# Patient Record
Sex: MALE | Race: WHITE | Employment: OTHER | URBAN - METROPOLITAN AREA
[De-identification: names, ages, dates, MRNs, and addresses within clinical notes are randomized per-mention and may not be internally consistent; named-entity substitution may affect disease eponyms.]

---

## 2017-01-13 ENCOUNTER — HOSPITAL ENCOUNTER (OUTPATIENT)
Dept: LAB | Age: 67
Discharge: HOME | End: 2017-01-13

## 2017-01-13 PROCEDURE — 99001 SPECIMEN HANDLING PT-LAB: CPT | Performed by: INTERNAL MEDICINE

## 2017-01-17 ENCOUNTER — OFFICE VISIT (OUTPATIENT)
Dept: INTERNAL MEDICINE CLINIC | Age: 67
End: 2017-01-17

## 2017-01-17 VITALS
TEMPERATURE: 97.6 F | HEIGHT: 69 IN | SYSTOLIC BLOOD PRESSURE: 122 MMHG | BODY MASS INDEX: 24.73 KG/M2 | WEIGHT: 167 LBS | OXYGEN SATURATION: 98 % | DIASTOLIC BLOOD PRESSURE: 62 MMHG | HEART RATE: 86 BPM

## 2017-01-17 DIAGNOSIS — M54.50 CHRONIC LOW BACK PAIN WITHOUT SCIATICA, UNSPECIFIED BACK PAIN LATERALITY: ICD-10-CM

## 2017-01-17 DIAGNOSIS — R80.9 MICROALBUMINURIA: ICD-10-CM

## 2017-01-17 DIAGNOSIS — E87.1 HYPONATREMIA: ICD-10-CM

## 2017-01-17 DIAGNOSIS — I73.9 PERIPHERAL VASCULAR DISEASE (HCC): ICD-10-CM

## 2017-01-17 DIAGNOSIS — G89.29 CHRONIC LOW BACK PAIN WITHOUT SCIATICA, UNSPECIFIED BACK PAIN LATERALITY: ICD-10-CM

## 2017-01-17 DIAGNOSIS — R21 RASH: ICD-10-CM

## 2017-01-17 DIAGNOSIS — I10 ESSENTIAL HYPERTENSION: Primary | ICD-10-CM

## 2017-01-17 DIAGNOSIS — Z23 ENCOUNTER FOR IMMUNIZATION: ICD-10-CM

## 2017-01-17 DIAGNOSIS — E78.5 HYPERLIPIDEMIA, UNSPECIFIED HYPERLIPIDEMIA TYPE: ICD-10-CM

## 2017-01-17 DIAGNOSIS — E13.8 DIABETES MELLITUS OF OTHER TYPE WITH COMPLICATION: ICD-10-CM

## 2017-01-17 NOTE — PROGRESS NOTES
1. Have you been to the ER, urgent care clinic or hospitalized since your last visit? NO.     2. Have you seen or consulted any other health care providers outside of the [facility] since your last visit (Include any pap smears or colon screening)? NO      Do you have an Advanced Directive? NO    Would you like information on Advanced Directives?  YES

## 2017-01-17 NOTE — MR AVS SNAPSHOT
Visit Information Date & Time Provider Department Dept. Phone Encounter #  
 1/17/2017  8:15 AM Jolly Rowe MD Internist of [facility] [phone] [id number] Your Appointments 7/13/2017  8:15 AM  
LAB with IOC NURSE VISIT Internist of [facility] ([address]) Appt Note: labs [address]  
 [address], [address] [place] [address] [place]  
  
   
 [address], [address]  
  
    
 7/20/2017  8:00 AM  
Office Visit with Jolly Rowe MD  
[facility]ist of [facility] [address]) Appt Note: [address]  
 [address], [address] [place] [address] [place]  
  
   
 [address] [facility], [address]  
  
    
 12/26/2017 12:45 PM  
PROCEDURE with [facility] IMAGING 1  
[facility] (SONIA SCHEDULING) Appt Note: cv [place] [address] [address]  
[phone] [address] [facility] [address]  
  
    
 12/26/2017  1:45 PM  
PROCEDURE with [facility] NONIMAGING  
[facility] (SONIA SCHEDULING) Appt Note: leg art [place] [address] [address]  
[phone] [address] [facility] [address]  
  
    
 1/2/2018  9:00 AM  
Follow Up with MICK Evans  
[facility] (SONIA SCHEDULING) [address] [facility] [address] [place] [address] [address] [address]  
[phone]  
  
   
 [address] [facility] [address] [place] [address] [address] [address] Upcoming Health Maintenance Date Due  
 FOOT EXAM Q1 2/18/2015 INFLUENZA AGE 9 TO ADULT 8/1/2016 LIPID PANEL Q1 9/1/2016 HEMOGLOBIN A1C Q6M 1/6/2017 EYE EXAM RETINAL OR DILATED Q1 3/16/2017 MICROALBUMIN Q1 7/6/2017 Pneumococcal 65+ Low/Medium Risk (2 of 2 - PPSV23) 7/13/2017 MEDICARE YEARLY EXAM 7/14/2017 GLAUCOMA SCREENING Q2Y 3/16/2018 COLONOSCOPY 6/18/2019 DTaP/Tdap/Td series (2 - Td) 2/17/2024 Allergies as of 1/17/2017  Review Complete On: 7/13/2016 By: rFanky Hodge MD  
 No Known Allergies Current Immunizations  Reviewed on 1/8/2015 Name Date Influenza High Dose Vaccine PF  Incomplete Influenza Vaccine 10/1/2012 Influenza Vaccine Split 10/14/2011 Pneumococcal Conjugate (PCV-13) 7/13/2016 Pneumococcal Vaccine (Unspecified Type) 8/17/2010 Tdap 2/17/2014 Zoster 8/18/2010 Not reviewed this visit You Were Diagnosed With   
  
 Codes Comments Diabetes mellitus of other type with complication ([facility]     XJQ-19-VE: E13.8 Secondary hypertension     ICD-10-CM: I15.9 ICD-9-CM: 405.99 Hyperlipidemia, unspecified hyperlipidemia type     ICD-10-CM: E78.5 ICD-9-CM: 272.4 Microalbuminuria     ICD-10-CM: R80.9 ICD-9-CM: 791.0 Encounter for immunization     ICD-10-CM: A71 ICD-9-CM: V03.89 Vitals BP Pulse Temp Height(growth percentile) Weight(growth percentile) SpO2  
 122/62 86 97.6 °F (36.4 °C) (Oral) 5' 9\" (1.753 m) 167 lb (75.8 kg) 98% BMI Smoking Status 24.66 kg/m2 Former Smoker Vitals History BMI and BSA Data Body Mass Index Body Surface Area  
 24.66 kg/m 2 1.92 m 2 Preferred Pharmacy Pharmacy Name Phone [address] [place] [address], [address] [place] [address] [phone] Your Updated Medication List  
  
   
This list is accurate as of: 1/17/17  8:52 AM.  Always use your most recent med list.  
  
  
  
  
 atorvastatin 10 mg tablet Commonly known as:  LIPITOR Take 1 Tab by mouth daily. benazepril 40 mg tablet Commonly known as:  LOTENSIN  
TAKE 1 TABLET BY MOUTH ONCE DAILY  
  
 chlorthalidone 25 mg tablet Commonly known as:  HYGROTEN  
TAKE ONE TABLET BY MOUTH ONCE DAILY  
  
 clopidogrel 75 mg Tab Commonly known as:  PLAVIX TAKE 1 TABLET BY MOUTH ONCE DAILY  
  
 diazePAM 5 mg tablet Commonly known as:  VALIUM  
 TAKE 1 TABLET BY MOUTH 2 TIMES A DAY AS NEEDED  
  
 gabapentin 600 mg tablet Commonly known as:  NEURONTIN  
TAKE 1 TABLET BY MOUTH 3 TIMES A DAY HYDROcodone-acetaminophen 5-325 mg per tablet Commonly known as:  Julaine Kava Take 1 Tab by mouth every six (6) hours as needed for Pain.  
  
 lansoprazole 30 mg capsule Commonly known as:  PREVACID Take 1 Cap by mouth Daily (before breakfast). metFORMIN 1,000 mg tablet Commonly known as:  GLUCOPHAGE Take 1 Tab by mouth two (2) times daily (with meals). triamcinolone 0.5 % topical cream  
Commonly known as:  ARISTOCORT Apply  to affected area two (2) times a day. use thin layer We Performed the Following ADMIN INFLUENZA VIRUS VAC [ [facility]] INFLUENZA VIRUS VACCINE, HIGH DOSE SEASONAL, PRESERVATIVE FREE [67661 CPT(R)] Introducing \[facility]\"" & [facility]! Dear Zane Kumar: Thank you for requesting a VISEO account. Our records indicate that you have previously registered for a VISEO account but its currently inactive. Please call our VISEO support line at [phone]. Additional Information If you have questions, please visit the Frequently Asked Questions section of the VISEO website at [email or web address] Remember, VISEO is NOT to be used for urgent needs. For medical emergencies, dial 911. Now available from your iPhone and Android! Please provide this summary of care documentation to your next provider. Your primary care clinician is listed as Heidi Moseley. If you have any questions after today's visit, please call [phone].

## 2017-01-23 PROBLEM — N18.31 CHRONIC KIDNEY DISEASE (CKD) STAGE G3A/A1, MODERATELY DECREASED GLOMERULAR FILTRATION RATE (GFR) BETWEEN 45-59 ML/MIN/1.73 SQUARE METER AND ALBUMINURIA CREATININE RATIO LESS THAN 30 MG/G (HCC): Status: ACTIVE | Noted: 2017-01-23

## 2017-01-23 RX ORDER — TRIAMCINOLONE ACETONIDE 5 MG/G
CREAM TOPICAL 2 TIMES DAILY
Qty: 60 G | Refills: 3 | Status: SHIPPED | OUTPATIENT
Start: 2017-01-23 | End: 2017-01-24 | Stop reason: SDUPTHER

## 2017-01-24 DIAGNOSIS — R21 RASH: ICD-10-CM

## 2017-01-24 RX ORDER — TRIAMCINOLONE ACETONIDE 5 MG/G
CREAM TOPICAL 2 TIMES DAILY
Qty: 60 G | Refills: 3 | Status: SHIPPED | OUTPATIENT
Start: 2017-01-24

## 2017-01-24 RX ORDER — LANSOPRAZOLE 30 MG/1
30 CAPSULE, DELAYED RELEASE ORAL
Qty: 90 CAP | Refills: 3 | Status: SHIPPED | OUTPATIENT
Start: 2017-01-24 | End: 2017-07-20 | Stop reason: ALTCHOICE

## 2017-01-24 NOTE — PROGRESS NOTES
pls call pt    The blood drawn earlier this month was supposed to have been done in late July AFTER he held his diuretic  This was not done at that time  We need to redraw  THIS TIME, he needs to hold chlorthalidone 2 weeks and then draw blood  If still abn sodium, we will need to do a second set of labs after that  Do as directed otherwise i won't be able to figure out the reason for low sodium

## 2017-01-24 NOTE — PROGRESS NOTES
77 y.o. white male who presents for f/u    Continues to try to be active although the back is limiting him. No cardiovascular complaints. No polyuria, polydipsia, nocturia, vision change or neurologic complaints. Sees Dr. Carmel Spicer but no podiatry.       Vitals 1/17/2017 7/13/2016 3/14/2016 12/23/2015 9/8/2015   Weight 167 lb 176 lb 180 lb 162 lb 162 lb     Vitals 3/6/2015 1/8/2015   Weight 175 lb 8 oz 177 lb     Denies any claudication sx and he was told by vascular to f/u in 2 years after the last visit 12/15    He is off nsaid and uses ultram, norco and valium    Past Medical History   Diagnosis Date    Anemia      fe def w gi w/u Dr Ajit Lee cell carcinoma      s/p resection Dr. Jenaro Null Chronic back pain inoperable Dr. Hilton Collado and Dr. Nurys Polanco Colon adenoma      and diverticulosis Dr Eda Cartagena 2014    Diabetes mellitus ([address]      microalbuminuria    DJD (degenerative joint disease)     Dyshidrotic eczema     Dyslipidemia     ED (erectile dysfunction)     FHx: heart disease     Gastritis      2014 Dr Eda Cartagena neg h pylori    GERD (gastroesophageal reflux disease) 2014     on EGD Dr Eda Cartagena    Hypertension     Meniere's disease      s/p surgery Dr Mary Jernigan PAD (peripheral artery disease) [facility])      Past Surgical History   Procedure Laterality Date    Hx cholecystectomy  2011     Dr. Jarett Kelly Pr cardiac surg procedure unlist  10/10     negative thallium EF 68%    Vascular surgery procedure unlist  10/10     Illiac Bypass left Graft Surgery  Dr. Nikos Chamorro Hx gi  2014     pill camera non bleeding ileal AVM    Hx colonoscopy  2014     adenoma and divertics Dr Eda Cartagena    Hx tonsillectomy      Pr sinus surgery proc unlisted       left ear surgery Dr Tita Alba for meniere's     Social History     Social History    Marital status:      Spouse name: N/A    Number of children: 0    Years of education: N/A     Occupational History    ret sup city of [employer] [address] History Main Topics    Smoking status: Former Smoker     Packs/day: 1.00     Quit date: 10/22/2011    Smokeless tobacco: Never Used    Alcohol use No    Drug use: No    Sexual activity: Not on file     Other Topics Concern    Not on file     Social History Narrative     Current Outpatient Prescriptions   Medication Sig    triamcinolone (ARISTOCORT) 0.5 % topical cream Apply  to affected area two (2) times a day. use thin layer    diazePAM (VALIUM) 5 mg tablet TAKE 1 TABLET BY MOUTH 2 TIMES A DAY AS NEEDED    gabapentin (NEURONTIN) 600 mg tablet TAKE 1 TABLET BY MOUTH 3 TIMES A DAY    benazepril (LOTENSIN) 40 mg tablet TAKE 1 TABLET BY MOUTH ONCE DAILY    metFORMIN (GLUCOPHAGE) 1,000 mg tablet Take 1 Tab by mouth two (2) times daily (with meals).  atorvastatin (LIPITOR) 10 mg tablet Take 1 Tab by mouth daily.  clopidogrel (PLAVIX) 75 mg tablet TAKE 1 TABLET BY MOUTH ONCE DAILY    chlorthalidone (HYGROTEN) 25 mg tablet TAKE ONE TABLET BY MOUTH ONCE DAILY    lansoprazole (PREVACID) 30 mg capsule Take 1 Cap by mouth Daily (before breakfast).  HYDROcodone-acetaminophen (NORCO) 5-325 mg per tablet Take 1 Tab by mouth every six (6) hours as needed for Pain. No current facility-administered medications for this visit.       No Known Allergies    LAST MEDICARE WELLNESS EXAM: 7/13/16       ACP 7/13/16    REVIEW OF SYSTEMS: sees Dr. Maxi Hubbard, no podiatry, colo 2014 Dr Angel Guo  Ophtho - no vision change or eye pain  Oral - no mouth pain, tongue or tooth problems  Ears - no hearing loss, ear pain, fullness, no swallowing problems  Cardiac - no CP, PND, orthopnea, edema, palpitations or syncope  Chest - no breast masses  Resp - no wheezing, chronic coughing, dyspnea  GI - no heartburn, nausea, vomiting, change in bowel habits, bleeding, hemorrhoids  Urinary - no dysuria, hematuria, flank pain, urgency, frequency  Endo - no polyuria, polydipsia, nocturia, hot flashes    Visit Vitals    /62    Pulse 86    Temp 97.6 °F (36.4 °C) (Oral)    Ht 5' 9\" (1.753 m)    Wt 167 lb (75.8 kg)    SpO2 98%    BMI 24.66 kg/m2     A&O x3  Affect is appropriate. Mood stable  No apparent distress  Anicteric, no JVD, adenopathy or thyromegaly. No carotid bruits or radiated murmur  Lungs clear to auscultation, no wheezes or rales  Heart showed regular rate and rhythm. No murmur, rubs, gallops  Abdomen soft nontender, no hepatosplenomegaly or masses.    Ext without c/c/e    LABS  From 10/11 showed gluc 99,  cr 0.98, gfr 83, alt 43,  hba1c 5.6, ldl-p 1018, chol 124, tg 223, hdl 37, ldl-c 42, umar 11.2, tsh 2.10,          psa 0.90  From 4/12 showed                  hba1c 5.8, ldl-p 1136, chol 143, tg 188, hdl 38, ldl-c 67, umar 9.5  From 10/12 showed gluc 87,  cr 0.99, gfr 81, alt 15,  hba1c 5.6, ldl-p 500,   chol 109, tg 125, hdl 40, ldl-c 44  From 7/13 showed                  hba1c 5.7,                   chol 136, tg 137, hdl 42, ldl-c 67   From 2/14 showed                  hba1c 6.0,               chol 136, tg 72,   hdl 52, ldl-c 72, umar 347, wbc 12.4, hb 12.0, plt 240, psa 1.39  From 5/14 showed                  hba1c 6.0,               chol 135, tg 94,   hdl 45, ldl-c 71, umar 469, wbc 9.5,   hb 11.6, plt 232, psa 1.35  From 5/14 showed                                    fe 25, %sat 7, ferritin 32, b12 699, fol 16.1, spep neg  From 6/14 showed   gluc 100, cr 1.33, gfr 54  From 8/14 showed                  hba1c 6.1,               chol 137, tg 93,   hdl 46, ldl-c 98, umar 100  From 2/15 showed   gluc 100, cr 1.50, gfr 46, alt 14, hba1c 5.9,             umar 82.1, wbc 9.9,  hb 11.2, plt 203  From 9/15 showed   gluc 88,   cr 1.30,    alt 11, hba1c 5.6,    chol 135, tg 123, hdl 43, ldl-c 67, umar 159,  wbc 9.2,  hb 11.8, plt 225  From 3/16 showed   gluc 77,   cr 1.58, gfr 45,   hba1c 6.0,                  hep c neg, na 133  From 7/16 showed   gluc 96,   cr 1.41, gfr 52,   hba1c 5.9,             umar 163, wbc 9.6, hb 12.1, plt 281, na 131    Results for orders placed or performed in visit on 61/50/98   METABOLIC PANEL, BASIC   Result Value Ref Range    Glucose 79 65 - 99 mg/dL    BUN 15 8 - 27 mg/dL    Creatinine 1.50 (H) 0.76 - 1.27 mg/dL    GFR est non-AA 48 (L) >59 mL/min/1.73    GFR est AA 55 (L) >59 mL/min/1.73    BUN/Creatinine ratio 10 10 - 22    Sodium 131 (L) 134 - 144 mmol/L    Potassium 5.1 3.5 - 5.2 mmol/L    Chloride 92 (L) 96 - 106 mmol/L    CO2 24 18 - 29 mmol/L    Calcium 9.1 8.6 - 10.2 mg/dL   CKD REPORT   Result Value Ref Range    Interpretation Note    OSMOLALITY, UR   Result Value Ref Range    Osmolality, Urine 229 mOsmol/kg   OSMOLALITY, SERUM/PLASMA   Result Value Ref Range    Osmolality, Serum 269 (L) 280 - 301 mOsmol/kg   SODIUM, URINE   Result Value Ref Range    Sodium, Urine 49 Not Estab. mmol/L   AMB POC URINALYSIS DIP STICK AUTO W/O MICRO   Result Value Ref Range    Color (UA POC) Yellow     Clarity (UA POC) Clear     Glucose (UA POC) Negative Negative    Bilirubin (UA POC) Negative Negative    Ketones (UA POC) Negative Negative    Specific gravity (UA POC) 1.020 1.001 - 1.035    Blood (UA POC) Trace Negative    pH (UA POC) 5.5 4.6 - 8.0    Protein (UA POC) 2+ Negative mg/dL    Urobilinogen (UA POC) 0.2 mg/dL 0.2 - 1    Nitrites (UA POC) Negative Negative    Leukocyte esterase (UA POC) Negative Negative     Patient Active Problem List   Diagnosis Code    Hyperlipidemia E78.5    Chronic back pain inoperable Dr. Yesica King and Dr. Sunshine Siddiqui M54.9, G89.29    Peripheral vascular disease s/p left iliac bpg Dr. Beau Randle 10/10 I73.9    Erectile dysfunction N52.9    Arthritis, degenerative M19.90    Essential hypertension I10    GERD without esophagitis K21.9    Controlled type 2 diabetes mellitus with microalbuminuria, without long-term current use of insulin ([facility] [address] E11.29, R80.9    Advance directive in chart Z78.9    Chronic kidney disease (CKD) stage G3a/A1 N18.3     Assessment and plan:  1. [place]. Followup Dr. Greene Marrow prn  2. Diabetes w microalbuminuria. Well-controlled on metformin alone. 3. Hyperlipidemia. Continue current   4. Chronic back problems. Continue current   5. Vascular. F/U Dr. Norah Ramos 12/17 as directed  6. Renal.  Follow  7. Hyponatremia. He did not get the blood drawn off diuretic as instructed back after thew July visit so will have to redraw        RTC 7/17    Above conditions discussed at length and patient vocalized understanding.   All questions answered to patient satifaction

## 2017-02-07 ENCOUNTER — TELEPHONE (OUTPATIENT)
Dept: INTERNAL MEDICINE CLINIC | Age: 67
End: 2017-02-07

## 2017-02-07 ENCOUNTER — HOSPITAL ENCOUNTER (OUTPATIENT)
Dept: LAB | Age: 67
Discharge: HOME | End: 2017-02-07

## 2017-02-07 DIAGNOSIS — R80.9 CONTROLLED TYPE 2 DIABETES MELLITUS WITH MICROALBUMINURIA, WITHOUT LONG-TERM CURRENT USE OF INSULIN (HCC): Primary | ICD-10-CM

## 2017-02-07 DIAGNOSIS — M19.90 OSTEOARTHRITIS, UNSPECIFIED OSTEOARTHRITIS TYPE, UNSPECIFIED SITE: ICD-10-CM

## 2017-02-07 DIAGNOSIS — E11.29 CONTROLLED TYPE 2 DIABETES MELLITUS WITH MICROALBUMINURIA, WITHOUT LONG-TERM CURRENT USE OF INSULIN (HCC): Primary | ICD-10-CM

## 2017-02-07 PROCEDURE — 99001 SPECIMEN HANDLING PT-LAB: CPT | Performed by: INTERNAL MEDICINE

## 2017-02-07 RX ORDER — DIAZEPAM 5 MG/1
TABLET ORAL
Qty: 60 TAB | Refills: 0 | OUTPATIENT
Start: 2017-02-07 | End: 2017-04-05 | Stop reason: SDUPTHER

## 2017-02-07 NOTE — TELEPHONE ENCOUNTER
I called after he left this morning. He asked Santa Stanley if he could restart his Chlorthalidone so I was calling to let patient know that he doesn't need to restart it until Dr. Deras Call reviews his labs and verifies that his sodium is okay. Spoke with patient's wife, I asked for patient, but she didn't give me the option to talk with him. She went on for about 8 minutes about his appt not being scheduled for today. Not sure why, but when Natalie Kohler talked with him on 1/24/17 and gave him instructions, his appt didn't get scheduled in [place]. At any rate, instructed to not start medication until labs are reviewed.

## 2017-02-07 NOTE — TELEPHONE ENCOUNTER
Patient is requesting a refill of valium 5 mg last office visit 12/7/16 last last script date 01/17/17

## 2017-02-07 NOTE — TELEPHONE ENCOUNTER
Pt had a lab appt today-said Mirtha Gray made appt-when they came in there was no appt but he did get seen- they were here while Mirtha Gray was trying to call them so she is returning the call now

## 2017-02-07 NOTE — TELEPHONE ENCOUNTER
Referral auth # [id number]  83 visits  02/07/2017 to 02/03/2018  Mailed to pt faxed to aFzal Hawkins [phone]

## 2017-02-08 LAB
BUN SERPL-MCNC: 13 MG/DL (ref 8–27)
BUN/CREAT SERPL: 9 (ref 10–22)
CALCIUM SERPL-MCNC: 9 MG/DL (ref 8.6–10.2)
CHLORIDE SERPL-SCNC: 96 MMOL/L (ref 96–106)
CO2 SERPL-SCNC: 24 MMOL/L (ref 18–29)
CREAT SERPL-MCNC: 1.46 MG/DL (ref 0.76–1.27)
GLUCOSE SERPL-MCNC: 78 MG/DL (ref 65–99)
INTERPRETATION: NORMAL
POTASSIUM SERPL-SCNC: 4.9 MMOL/L (ref 3.5–5.2)
SODIUM SERPL-SCNC: 135 MMOL/L (ref 134–144)

## 2017-03-13 RX ORDER — GABAPENTIN 600 MG/1
TABLET ORAL
Qty: 90 TAB | Refills: 5 | Status: SHIPPED | OUTPATIENT
Start: 2017-03-13 | End: 2017-09-13 | Stop reason: SDUPTHER

## 2017-04-05 DIAGNOSIS — M19.90 OSTEOARTHRITIS, UNSPECIFIED OSTEOARTHRITIS TYPE, UNSPECIFIED SITE: ICD-10-CM

## 2017-04-05 RX ORDER — DIAZEPAM 5 MG/1
TABLET ORAL
Qty: 60 TAB | Refills: 0 | Status: SHIPPED | OUTPATIENT
Start: 2017-04-05 | End: 2017-05-29 | Stop reason: SDUPTHER

## 2017-04-12 RX ORDER — CLOPIDOGREL BISULFATE 75 MG/1
TABLET ORAL
Qty: 30 TAB | Refills: 12 | Status: SHIPPED | OUTPATIENT
Start: 2017-04-12 | End: 2018-04-23 | Stop reason: SDUPTHER

## 2017-05-29 DIAGNOSIS — E78.00 PURE HYPERCHOLESTEROLEMIA: ICD-10-CM

## 2017-05-29 DIAGNOSIS — M19.90 OSTEOARTHRITIS, UNSPECIFIED OSTEOARTHRITIS TYPE, UNSPECIFIED SITE: ICD-10-CM

## 2017-05-30 RX ORDER — METFORMIN HYDROCHLORIDE 1000 MG/1
TABLET ORAL
Qty: 180 TAB | Refills: 3 | Status: SHIPPED | OUTPATIENT
Start: 2017-05-30 | End: 2018-06-11 | Stop reason: SDUPTHER

## 2017-05-30 RX ORDER — DIAZEPAM 5 MG/1
TABLET ORAL
Qty: 60 TAB | Refills: 0 | Status: SHIPPED | OUTPATIENT
Start: 2017-05-30 | End: 2017-08-20 | Stop reason: SDUPTHER

## 2017-05-31 ENCOUNTER — TELEPHONE (OUTPATIENT)
Dept: INTERNAL MEDICINE CLINIC | Age: 67
End: 2017-05-31

## 2017-06-09 DIAGNOSIS — E78.5 HYPERLIPIDEMIA, UNSPECIFIED HYPERLIPIDEMIA TYPE: ICD-10-CM

## 2017-06-09 DIAGNOSIS — R80.9 MICROALBUMINURIA: ICD-10-CM

## 2017-06-09 RX ORDER — ATORVASTATIN CALCIUM 10 MG/1
10 TABLET, FILM COATED ORAL DAILY
Qty: 90 TAB | Refills: 3 | Status: SHIPPED | OUTPATIENT
Start: 2017-06-09 | End: 2018-06-11 | Stop reason: SDUPTHER

## 2017-07-13 ENCOUNTER — HOSPITAL ENCOUNTER (OUTPATIENT)
Dept: LAB | Age: 67
Discharge: HOME | End: 2017-07-13

## 2017-07-13 ENCOUNTER — LAB ONLY (OUTPATIENT)
Dept: INTERNAL MEDICINE CLINIC | Age: 67
End: 2017-07-13

## 2017-07-13 DIAGNOSIS — E11.29 CONTROLLED TYPE 2 DIABETES MELLITUS WITH MICROALBUMINURIA, WITHOUT LONG-TERM CURRENT USE OF INSULIN (HCC): Primary | ICD-10-CM

## 2017-07-13 DIAGNOSIS — R80.9 CONTROLLED TYPE 2 DIABETES MELLITUS WITH MICROALBUMINURIA, WITHOUT LONG-TERM CURRENT USE OF INSULIN (HCC): Primary | ICD-10-CM

## 2017-07-13 DIAGNOSIS — E78.5 HYPERLIPIDEMIA, UNSPECIFIED HYPERLIPIDEMIA TYPE: ICD-10-CM

## 2017-07-13 DIAGNOSIS — I10 ESSENTIAL HYPERTENSION: ICD-10-CM

## 2017-07-13 PROCEDURE — 99001 SPECIMEN HANDLING PT-LAB: CPT | Performed by: INTERNAL MEDICINE

## 2017-07-14 LAB
ALBUMIN SERPL-MCNC: 4.1 G/DL (ref 3.6–4.8)
ALBUMIN/CREAT UR: 1042.9 MG/G CREAT (ref 0–30)
ALBUMIN/GLOB SERPL: 1.4 {RATIO} (ref 1.2–2.2)
ALP SERPL-CCNC: 100 IU/L (ref 39–117)
ALT SERPL-CCNC: 6 IU/L (ref 0–44)
AST SERPL-CCNC: 11 IU/L (ref 0–40)
BILIRUB SERPL-MCNC: 0.2 MG/DL (ref 0–1.2)
BUN SERPL-MCNC: 16 MG/DL (ref 8–27)
BUN/CREAT SERPL: 10 (ref 10–24)
CALCIUM SERPL-MCNC: 9.1 MG/DL (ref 8.6–10.2)
CHLORIDE SERPL-SCNC: 98 MMOL/L (ref 96–106)
CHOLEST SERPL-MCNC: 170 MG/DL (ref 100–199)
CO2 SERPL-SCNC: 24 MMOL/L (ref 18–29)
CREAT SERPL-MCNC: 1.57 MG/DL (ref 0.76–1.27)
CREAT UR-MCNC: 116.7 MG/DL
EST. AVERAGE GLUCOSE BLD GHB EST-MCNC: 120 MG/DL
GLOBULIN SER CALC-MCNC: 3 G/DL (ref 1.5–4.5)
GLUCOSE SERPL-MCNC: 92 MG/DL (ref 65–99)
HBA1C MFR BLD: 5.8 % (ref 4.8–5.6)
HDLC SERPL-MCNC: 44 MG/DL
INTERPRETATION, 910389: NORMAL
INTERPRETATION: NORMAL
LDLC SERPL CALC-MCNC: 99 MG/DL (ref 0–99)
MICROALBUMIN UR-MCNC: 1217.1 UG/ML
PDF IMAGE, 910387: NORMAL
POTASSIUM SERPL-SCNC: 5.2 MMOL/L (ref 3.5–5.2)
PROT SERPL-MCNC: 7.1 G/DL (ref 6–8.5)
SODIUM SERPL-SCNC: 138 MMOL/L (ref 134–144)
TRIGL SERPL-MCNC: 135 MG/DL (ref 0–149)
VLDLC SERPL CALC-MCNC: 27 MG/DL (ref 5–40)

## 2017-07-17 NOTE — PROGRESS NOTES
79 y.o. white male who presents for f/u    Continues to try to be active although the back is limiting him. No cardiovascular complaints. No polyuria, polydipsia, nocturia, vision change or neurologic complaints. Keeping his weight down as below. Sees Dr. Jennifer Rodriges but no podiatry. Vitals 7/20/2017 1/17/2017 7/13/2016 3/14/2016 12/23/2015   Weight 164 lb 12.8 oz 167 lb 176 lb 180 lb 162 lb     Denies any claudication sx although he has routine f/u later this fall and needs referral    He is off nsaid and uses ultram, norco and valium. Been on ppi for years.   No frothy urine observed    We had taken him off the diuretic at the last visit due to concerns about his renal function, he has not been checking his pressures outside    Past Medical History:   Diagnosis Date    Anemia     fe def w gi w/u Dr Sultana Pulse cell carcinoma     s/p resection Dr. Anaya Fang Chronic back pain inoperable Dr. Graciela Gee and Dr. Zafar Dejesus Colon adenoma     and diverticulosis Dr Eri Davison 2014    Diabetes mellitus ([facility] [address]     microalbuminuria    DJD (degenerative joint disease)     Dyshidrotic eczema     Dyslipidemia     ED (erectile dysfunction)     FHx: heart disease     Gastritis     2014 Dr Eri Davison neg h pylori    GERD (gastroesophageal reflux disease) 2014    on EGD Dr Eri Davison    Hypertension     Meniere's disease     s/p surgery Dr Joanna Russell PAD (peripheral artery disease) [facility])      Past Surgical History:   Procedure Laterality Date    CARDIAC SURG PROCEDURE UNLIST  10/10    negative thallium EF 68%    HX CHOLECYSTECTOMY  2011    Dr. Joseph Cogan HX COLONOSCOPY  2014    adenoma and divertics Dr Amado Paulson  2014    pill camera non bleeding ileal AVM    HX TONSILLECTOMY      SINUS SURGERY PROC UNLISTED      left ear surgery Dr Donavon Parikh for meniere's    VASCULAR SURGERY PROCEDURE UNLIST  10/10    [place] [facility] left Graft Surgery  Dr. Reid Gaveranjan History     Social History    Marital status:      Spouse name: N/A    Number of children: 0    Years of education: N/A     Occupational History    ret sup [employer]      Social History Main Topics    Smoking status: Former Smoker     Packs/day: 1.00     Quit date: 10/22/2011    Smokeless tobacco: Never Used    Alcohol use No    Drug use: No    Sexual activity: Not on file     Other Topics Concern    Not on file     Social History Narrative     Current Outpatient Prescriptions   Medication Sig    famotidine (PEPCID) 20 mg tablet Take 1 Tab by mouth two (2) times a day.  atorvastatin (LIPITOR) 10 mg tablet Take 1 Tab by mouth daily.  metFORMIN (GLUCOPHAGE) 1,000 mg tablet TAKE 1 TABLET BY MOUTH 2 TIMES A DAY WITH MEALS    diazePAM (VALIUM) 5 mg tablet TAKE 1 TABLET BY MOUTH 2 TIMES A DAY AS NEEDED    clopidogrel (PLAVIX) 75 mg tab TAKE 1 TABLET BY MOUTH ONCE DAILY    gabapentin (NEURONTIN) 600 mg tablet TAKE 1 TABLET BY MOUTH 3 TIMES A DAY    triamcinolone (ARISTOCORT) 0.5 % topical cream Apply  to affected area two (2) times a day. use thin layer    benazepril (LOTENSIN) 40 mg tablet TAKE 1 TABLET BY MOUTH ONCE DAILY    HYDROcodone-acetaminophen (NORCO) 5-325 mg per tablet Take 1 Tab by mouth every six (6) hours as needed for Pain. No current facility-administered medications for this visit.       No Known Allergies    LAST MEDICARE WELLNESS EXAM: 7/13/16, 7/20/17      ACP 7/13/16, 7/20/17    REVIEW OF SYSTEMS: sees Dr. Barrie Milian, no podiatry, colo 2014 Dr Cass Joy  Ophtho  no vision change or eye pain  Oral  no mouth pain, tongue or tooth problems  Ears  no hearing loss, ear pain, fullness, no swallowing problems  Cardiac  no CP, PND, orthopnea, edema, palpitations or syncope  Chest  no breast masses  Resp  no wheezing, chronic coughing, dyspnea  GI  no heartburn, nausea, vomiting, change in bowel habits, bleeding, hemorrhoids  Urinary  no dysuria, hematuria, flank pain, urgency, frequency  Endo - no polyuria, polydipsia, nocturia, hot flashes    Visit Vitals    /62 (BP 1 Location: Left arm, BP Patient Position: Sitting)    Pulse 81    Temp 98.4 °F (36.9 °C) (Oral)    Resp 14    Ht 5' 9\" (1.753 m)    Wt 164 lb 12.8 oz (74.8 kg)    SpO2 98%    BMI 24.34 kg/m2     A&O x3  Affect is appropriate. Mood stable  No apparent distress  Anicteric, no JVD, adenopathy or thyromegaly. No carotid bruits or radiated murmur  Lungs clear to auscultation, no wheezes or rales  Heart showed regular rate and rhythm. No murmur, rubs, gallops  Abdomen soft nontender, no hepatosplenomegaly or masses.    Ext without c/c/e    LABS  From 10/11 showed gluc 99,  cr 0.98, gfr 83, alt 43,  hba1c 5.6, ldl-p 1018, chol 124, tg 223, hdl 37, ldl-c 42, umar 11.2, tsh 2.10,          psa 0.90  From 4/12 showed                  hba1c 5.8, ldl-p 1136, chol 143, tg 188, hdl 38, ldl-c 67, umar 9.5  From 10/12 showed gluc 87,  cr 0.99, gfr 81, alt 15,  hba1c 5.6, ldl-p 500,   chol 109, tg 125, hdl 40, ldl-c 44  From 7/13 showed                  hba1c 5.7,                   chol 136, tg 137, hdl 42, ldl-c 67   From 2/14 showed                  hba1c 6.0,               chol 136, tg 72,   hdl 52, ldl-c 72, umar 347, wbc 12.4, hb 12.0, plt 240, psa 1.39  From 5/14 showed                  hba1c 6.0,               chol 135, tg 94,   hdl 45, ldl-c 71, umar 469, wbc 9.5,   hb 11.6, plt 232, psa 1.35  From 5/14 showed                                    fe 25, %sat 7, ferritin 32, b12 699, fol 16.1, spep neg  From 6/14 showed   gluc 100, cr 1.33, gfr 54  From 8/14 showed                  hba1c 6.1,               chol 137, tg 93,   hdl 46, ldl-c 98, umar 100  From 2/15 showed   gluc 100, cr 1.50, gfr 46, alt 14, hba1c 5.9,             umar 82.1, wbc 9.9,  hb 11.2, plt 203  From 9/15 showed   gluc 88,   cr 1.30,    alt 11, hba1c 5.6,    chol 135, tg 123, hdl 43, ldl-c 67, umar 159,  wbc 9.2,  hb 11.8, plt 225  From 3/16 showed   gluc 77,   cr 1.58, gfr 45, hba1c 6.0,                  hep c neg, na 133  From 7/16 showed   gluc 96,   cr 1.41, gfr 52,   hba1c 5.9,             umar 163, wbc 9.6, hb 12.1, plt 281,                    na 131  From 1/17 showed   gluc 79,   cr 1.50, gfr 48    Results for orders placed or performed in visit on 07/91/12   METABOLIC PANEL, COMPREHENSIVE   Result Value Ref Range    Glucose 92 65 - 99 mg/dL    BUN 16 8 - 27 mg/dL    Creatinine 1.57 (H) 0.76 - 1.27 mg/dL    GFR est non-AA 45 (L) >59 mL/min/1.73    GFR est AA 52 (L) >59 mL/min/1.73    BUN/Creatinine ratio 10 10 - 24    Sodium 138 134 - 144 mmol/L    Potassium 5.2 3.5 - 5.2 mmol/L    Chloride 98 96 - 106 mmol/L    CO2 24 18 - 29 mmol/L    Calcium 9.1 8.6 - 10.2 mg/dL    Protein, total 7.1 6.0 - 8.5 g/dL    Albumin 4.1 3.6 - 4.8 g/dL    GLOBULIN, TOTAL 3.0 1.5 - 4.5 g/dL    A-G Ratio 1.4 1.2 - 2.2    Bilirubin, total 0.2 0.0 - 1.2 mg/dL    Alk.  phosphatase 100 39 - 117 IU/L    AST (SGOT) 11 0 - 40 IU/L    ALT (SGPT) 6 0 - 44 IU/L   LIPID PANEL   Result Value Ref Range    Cholesterol, total 170 100 - 199 mg/dL    Triglyceride 135 0 - 149 mg/dL    HDL Cholesterol 44 >39 mg/dL    VLDL, calculated 27 5 - 40 mg/dL    LDL, calculated 99 0 - 99 mg/dL   MICROALBUMIN, UR, RAND   Result Value Ref Range    Creatinine, urine 116.7 Not Estab. mg/dL    Microalbumin, urine 1217.1 Not Estab. ug/mL    Microalb/Creat ratio (ug/mg creat.) 1042.9 (H) 0.0 - 30.0 mg/g creat   CVD REPORT   Result Value Ref Range    INTERPRETATION NTAP     PDF IMAGE Not applicable    CKD REPORT   Result Value Ref Range    Interpretation Note    HEMOGLOBIN A1C   Result Value Ref Range    Hemoglobin A1c 5.8 (H) 4.8 - 5.6 %    Estimated average glucose 120 mg/dL     Patient Active Problem List   Diagnosis Code    Hyperlipidemia E78.5    Chronic back pain inoperable Dr. Rosita Storm and Dr. Gautam Scot M54.9, O30.35    Peripheral vascular disease s/p left iliac bpg Dr. Valarie Harvey 10/10 I73.9    Erectile dysfunction N52.9    Arthritis, degenerative M19.90    Essential hypertension I10    GERD without esophagitis K21.9    Controlled type 2 diabetes mellitus with microalbuminuria E11.29, R80.9    Advance directive in chart Z78.9    Chronic kidney disease (CKD) stage G3a/A1 N18.3    Chronic kidney disease (CKD) stage G3a/A3, moderately decreased glomerular filtration rate (GFR) between 45-59 mL/min/1.73 square meter and albuminuria creatinine ratio greater than 300 mg/g N18.3     Assessment and plan:  1. Ménière's. Followup Dr. Diandra Elaine prn  2. Diabetes w microalbuminuria. Well-controlled on metformin alone. Will end to nephrology as below  3. Hyperlipidemia. Continue current   4. Chronic back problems. Continue current   5. Vascular. F/U Dr. Suly Rousseau 12/17 as directed  6. Renal.  Big jump in umar noted. Will send to nephrology, change to h2b from ppi as trial  7. HTN. Add amlo and he will follow bp outside      RTC 11/17    Above conditions discussed at length and patient vocalized understanding.   All questions answered to patient satifaction

## 2017-07-17 NOTE — ACP (ADVANCE CARE PLANNING)
Advance Care Planning    Advance Care Planning (ACP) Provider Note - Comprehensive     Date of ACP Conversation: 07/20/17  Persons included in Conversation:  patient and family  Length of ACP Conversation in minutes:  16 minutes    Authorized Decision Maker (if patient is incapable of making informed decisions): This person is:  Healthcare Agent/Medical Power of  under Advance Directive          General ACP for ALL Patients with Decision Making Capacity:   Importance of advance care planning, including choosing a healthcare agent to communicate patient's healthcare decisions if patient lost the ability to make decisions, such as after a sudden illness or accident  Understanding of the healthcare agent role was assessed and information provided  Exploration of values, goals, and preferences if recovery is not expected, even with continued medical treatment in the event of: Imminent death  Severe, permanent brain injury  \"In these circumstances, what matters most to you? \"  Care focused more on comfort or quality of life. Review of Existing Advance Directive:  Does this advance directive still reflect your preferences? Yes (Provide new form/Refer for assistance in updating)    For Serious or Chronic Illness:  Understanding of medical condition    Understanding of CPR, goals and expected outcomes, benefits and burdens discussed.     Interventions Provided:  Recommended communicating the plan and making copies for the healthcare agent, personal physician, and others as appropriate (e.g., health system)  Recommended review of completed ACP document annually or upon change in health status

## 2017-07-17 NOTE — PROGRESS NOTES
This is a subsequent Preventive Physical Examination     I have reviewed the patient's medical history in detail and updated the computerized patient record. History     Past Medical History:   Diagnosis Date    Anemia     fe def w gi w/u Dr Kinjal Morrell cell carcinoma     s/p resection Dr. Camille Reese Chronic back pain inoperable Dr. Rosita Storm and Dr. Vinod Chiu Colon adenoma     and diverticulosis Dr Radu Fortune 2014    Diabetes mellitus ([address]     microalbuminuria    DJD (degenerative joint disease)     Dyshidrotic eczema     Dyslipidemia     ED (erectile dysfunction)     FHx: heart disease     Gastritis     2014 Dr Radu Fortune neg h pylori    GERD (gastroesophageal reflux disease) 2014    on EGD Dr Bashir Nielsen Hypertension     Meniere's disease     s/p surgery Dr Rafaela Ojeda PAD (peripheral artery disease) [facility])       Past Surgical History:   Procedure Laterality Date    CARDIAC SURG PROCEDURE UNLIST  10/10    negative thallium EF 68%    HX CHOLECYSTECTOMY  2011    Dr. Antoine Glynn HX COLONOSCOPY  2014    adenoma and divertics Dr Camejo Linear HX GI  2014    pill camera non bleeding ileal AVM    HX TONSILLECTOMY      SINUS SURGERY PROC UNLISTED      left ear surgery Dr Melania Merlos for meniere's    VASCULAR SURGERY PROCEDURE UNLIST  10/10    [place] [facility] left Graft Surgery  Dr. Valarie Harvey     Current Outpatient Prescriptions   Medication Sig Dispense Refill    famotidine (PEPCID) 20 mg tablet Take 1 Tab by mouth two (2) times a day. 180 Tab 3    atorvastatin (LIPITOR) 10 mg tablet Take 1 Tab by mouth daily.  90 Tab 3    metFORMIN (GLUCOPHAGE) 1,000 mg tablet TAKE 1 TABLET BY MOUTH 2 TIMES A DAY WITH MEALS 180 Tab 3    diazePAM (VALIUM) 5 mg tablet TAKE 1 TABLET BY MOUTH 2 TIMES A DAY AS NEEDED 60 Tab 0    clopidogrel (PLAVIX) 75 mg tab TAKE 1 TABLET BY MOUTH ONCE DAILY 30 Tab 12    gabapentin (NEURONTIN) 600 mg tablet TAKE 1 TABLET BY MOUTH 3 TIMES A DAY 90 Tab 5    triamcinolone (ARISTOCORT) 0.5 % topical cream Apply  to affected area two (2) times a day. use thin layer 60 g 3    benazepril (LOTENSIN) 40 mg tablet TAKE 1 TABLET BY MOUTH ONCE DAILY 90 Tab 3    HYDROcodone-acetaminophen (NORCO) 5-325 mg per tablet Take 1 Tab by mouth every six (6) hours as needed for Pain. 61 Tab 1     No Known Allergies  Family History   Problem Relation Age of Onset    Heart Disease Mother     Stroke Father      Social History   Substance Use Topics    Smoking status: Former Smoker     Packs/day: 1.00     Quit date: 10/22/2011    Smokeless tobacco: Never Used    Alcohol use No     Diet, Lifestyle: generally follows a low fat low cholesterol diet, follows a diabetic diet regularly, sedentary    Exercise level: not active    Depression Risk Screen     PHQ over the last two weeks 7/20/2017   Little interest or pleasure in doing things Not at all   Feeling down, depressed or hopeless Not at all   Total Score PHQ 2 0     Immunization History   Administered Date(s) Administered    Influenza High Dose Vaccine PF 01/17/2017    Influenza Vaccine 10/01/2012    Influenza Vaccine Split 10/14/2011    Pneumococcal Conjugate (PCV-13) 07/13/2016    Pneumococcal Vaccine (Unspecified Type) 08/17/2010    Tdap 02/17/2014    Zoster 08/18/2010     SCREENINGS  Colonoscopy last done 2014 Dr Trixie Hutchinson  Prostate CHASITY done  PSA done    Alcohol Risk Screen   On any occasion during the past 3 months, have you had more than 4 drinks containing alcohol? No    Do you average more than 14 drinks per week? No    Functional Ability and Level of Safety     Hearing Loss   mild    Activities of Daily Living   Self-care     Fall Risk Screen     Fall Risk Assessment, last 12 mths 7/20/2017   Able to walk? Yes   Fall in past 12 months? No     Abuse Screen   Patient is not abused    Review of Systems   A comprehensive review of systems was negative except for that written in the HPI.     Physical Examination     Visit Vitals    /62 (BP 1 Location: Left arm, BP Patient Position: Sitting)    Pulse 81    Temp 98.4 °F (36.9 °C) (Oral)    Resp 14    Ht 5' 9\" (1.753 m)    Wt 164 lb 12.8 oz (74.8 kg)    SpO2 98%    BMI 24.34 kg/m2     Patient Care Team:  Nathalia Allan MD as PCP - General (Internal Medicine)  MICK Isbell (Vascular Surgery)  Jada Araujo RN as Ambulatory Care Navigator (Internal Medicine)  Reema Thurman MD (Ophthalmology)     End-of-life planning  Advanced Directive discussed and documented: YES    Assessment/Plan   Education and counseling provided:  Are appropriate based on today's review and evaluation  End-of-Life planning (with patient's consent)  Pneumococcal Vaccine  Colorectal cancer screening tests  Cardiovascular screening blood test  Diabetes screening test       ICD-10-CM ICD-9-CM    1. Routine general medical examination at a health care facility Z00.00 V70.0    2. Screening for alcoholism Z13.89 V79.1    3. Advanced directives, counseling/discussion Z71.89 V65.49 ADVANCE CARE PLANNING FIRST 30 MINS   4. Screening for depression Z13.89 V79.0 DEPRESSION SCREEN ANNUAL   5. Screen for colon cancer Z12.11 V76.51    6. Screening for ischemic heart disease Z13.6 V81.0    7. Encounter for immunization Z23 V03.89 ADMIN PNEUMOCOCCAL VACCINE      PNEUMOCOCCAL POLYSACCHARIDE VACCINE, 23-VALENT, ADULT OR IMMUNOSUPPRESSED PT DOSE,   8. Albuminuria R80.9 791.0 REFERRAL TO NEPHROLOGY      MICROALBUMIN, UR, RAND W/ MICROALBUMIN/CREA RATIO   9. Chronic kidney disease (CKD) stage G3a/A3, moderately decreased glomerular filtration rate (GFR) between 45-59 mL/min/1.73 square meter and albuminuria creatinine ratio greater than 300 mg/g N18.3 585.3 REFERRAL TO NEPHROLOGY      MICROALBUMIN, UR, RAND W/ MICROALBUMIN/CREA RATIO   10. Essential hypertension I10 401.9    11. Controlled type 2 diabetes mellitus with microalbuminuria E11.29 250.40 LIPID PANEL    R80.9 791.0 HEMOGLOBIN A1C W/O EAG      CBC W/O DIFF   12.  Chronic low back pain without sciatica, unspecified back pain laterality M54.5 724.2     G89.29 338.29    13. GERD without esophagitis K21.9 530.81 famotidine (PEPCID) 20 mg tablet   14. Peripheral vascular disease s/p left iliac bpg Dr. Reinaldo Herrera 10/10 I73.9 443.9 REFERRAL TO VASCULAR SURGERY     current treatment plan is effective  lab results and schedule of future lab studies reviewed with patient  cardiovascular risk and specific lipid/LDL goals reviewed. End of life discussion undertaken.   Has medical directive in place  Flu high dose when in season  pvx-[id number] to be given  Colonoscopy to be scheduled 2024

## 2017-07-20 ENCOUNTER — TELEPHONE (OUTPATIENT)
Dept: INTERNAL MEDICINE CLINIC | Age: 67
End: 2017-07-20

## 2017-07-20 ENCOUNTER — OFFICE VISIT (OUTPATIENT)
Dept: INTERNAL MEDICINE CLINIC | Age: 67
End: 2017-07-20

## 2017-07-20 VITALS
HEIGHT: 69 IN | SYSTOLIC BLOOD PRESSURE: 142 MMHG | WEIGHT: 164.8 LBS | DIASTOLIC BLOOD PRESSURE: 82 MMHG | BODY MASS INDEX: 24.41 KG/M2 | OXYGEN SATURATION: 98 % | RESPIRATION RATE: 14 BRPM | HEART RATE: 81 BPM | TEMPERATURE: 98.4 F

## 2017-07-20 DIAGNOSIS — N18.31 CHRONIC KIDNEY DISEASE (CKD) STAGE G3A/A3, MODERATELY DECREASED GLOMERULAR FILTRATION RATE (GFR) BETWEEN 45-59 ML/MIN/1.73 SQUARE METER AND ALBUMINURIA CREATININE RATIO GREATER THAN 300 MG/G (HCC): ICD-10-CM

## 2017-07-20 DIAGNOSIS — E11.29 CONTROLLED TYPE 2 DIABETES MELLITUS WITH MICROALBUMINURIA, WITHOUT LONG-TERM CURRENT USE OF INSULIN (HCC): ICD-10-CM

## 2017-07-20 DIAGNOSIS — K21.9 GERD WITHOUT ESOPHAGITIS: ICD-10-CM

## 2017-07-20 DIAGNOSIS — Z13.6 SCREENING FOR ISCHEMIC HEART DISEASE: ICD-10-CM

## 2017-07-20 DIAGNOSIS — R80.9 ALBUMINURIA: ICD-10-CM

## 2017-07-20 DIAGNOSIS — Z12.11 SCREEN FOR COLON CANCER: ICD-10-CM

## 2017-07-20 DIAGNOSIS — Z00.00 ROUTINE GENERAL MEDICAL EXAMINATION AT A HEALTH CARE FACILITY: Primary | ICD-10-CM

## 2017-07-20 DIAGNOSIS — I73.9 PERIPHERAL VASCULAR DISEASE (HCC): ICD-10-CM

## 2017-07-20 DIAGNOSIS — M54.50 CHRONIC LOW BACK PAIN WITHOUT SCIATICA, UNSPECIFIED BACK PAIN LATERALITY: ICD-10-CM

## 2017-07-20 DIAGNOSIS — Z71.89 ADVANCED DIRECTIVES, COUNSELING/DISCUSSION: ICD-10-CM

## 2017-07-20 DIAGNOSIS — G89.29 CHRONIC LOW BACK PAIN WITHOUT SCIATICA, UNSPECIFIED BACK PAIN LATERALITY: ICD-10-CM

## 2017-07-20 DIAGNOSIS — R80.9 CONTROLLED TYPE 2 DIABETES MELLITUS WITH MICROALBUMINURIA, WITHOUT LONG-TERM CURRENT USE OF INSULIN (HCC): ICD-10-CM

## 2017-07-20 DIAGNOSIS — Z13.39 SCREENING FOR ALCOHOLISM: ICD-10-CM

## 2017-07-20 DIAGNOSIS — Z13.31 SCREENING FOR DEPRESSION: ICD-10-CM

## 2017-07-20 DIAGNOSIS — I10 ESSENTIAL HYPERTENSION: ICD-10-CM

## 2017-07-20 DIAGNOSIS — Z23 ENCOUNTER FOR IMMUNIZATION: ICD-10-CM

## 2017-07-20 RX ORDER — FAMOTIDINE 20 MG/1
20 TABLET, FILM COATED ORAL 2 TIMES DAILY
Qty: 180 TAB | Refills: 3 | Status: SHIPPED | OUTPATIENT
Start: 2017-07-20 | End: 2017-11-30 | Stop reason: ALTCHOICE

## 2017-07-20 RX ORDER — AMLODIPINE BESYLATE 5 MG/1
5 TABLET ORAL DAILY
Qty: 90 TAB | Refills: 3 | Status: SHIPPED | OUTPATIENT
Start: 2017-07-20 | End: 2019-02-04 | Stop reason: DRUGHIGH

## 2017-07-20 NOTE — MR AVS SNAPSHOT
Visit Information Date & Time Provider Department Dept. Phone Encounter #  
 7/20/2017  8:00 AM Manuel Almonte MD Internist of [address] [place] [address] [phone] [id number] Your Appointments 11/17/2017  8:00 AM  
LAB with [facility] NURSE VISIT Internist of [facility] ([address] [place] [address]) Appt Note: lab  
 [address], [address] [address] [address]  
  
   
 [address] [facility]  
  
    
 11/30/2017  8:00 AM  
Office Visit with Manuel Almonte MD  
Internist of [address] [place] [address]) Appt Note: 4 months [address], [address] [facility] [address] [address] [address]  
  
   
 [address] [facility] Miguel Spencer [facility]  
  
    
 12/26/2017 12:45 PM  
PROCEDURE with [facility] IMAGING 1 [facility] ([address] [place] [address]) Appt Note: cv lupe; WAS TOLD PATIENT WOULD BE GOING TO AN HMO BY THIS TIME SO UNABLE TO GET REFERRAL TILL INS IS CHANGED IN COMPUTER; .  
 [phone] [facility] [address]  
[phone] [address] [place] [address]  
  
    
 12/26/2017  1:45 PM  
PROCEDURE with [facility] NONIMAGING [facility] ([address] [place] [address]) Appt Note: leg art knjonask; WAS TOLD PATIENT WOULD BE GOING TO AN HMO BY THIS TIME SO UNABLE TO GET REFERRAL TILL INS IS CHANGED IN COMPUTER; .  
 [phone] [facility] [address]  
[phone] [address] [place] [address]  
  
    
 1/2/2018  9:00 AM  
Follow Up with MICK Mendiola [facility] ([address] [place] [address]) Appt Note: 2 year fu after studies at [facility] [facility] on 12/26/2017; WAS TOLD PATIENT WOULD BE GOING TO AN HMO BY THIS TIME SO UNABLE TO GET REFERRAL TILL INS IS CHANGED IN COMPUTER; .  
 [address] [phone] [facility]  
[phone] 1212 [facility] 706 [facility] Upcoming Health Maintenance Date Due Pneumococcal 65+ Low/Medium Risk (2 of 2 - PPSV23) 7/13/2017 INFLUENZA AGE 9 TO ADULT 8/1/2017 HEMOGLOBIN A1C Q6M 1/13/2018 FOOT EXAM Q1 1/23/2018 EYE EXAM RETINAL OR DILATED Q1 3/17/2018 MICROALBUMIN Q1 7/13/2018 LIPID PANEL Q1 7/13/2018 MEDICARE YEARLY EXAM 7/21/2018 GLAUCOMA SCREENING Q2Y 3/17/2019 COLONOSCOPY 6/18/2019 DTaP/Tdap/Td series (2 - Td) 2/17/2024 Allergies as of 7/20/2017  Review Complete On: 7/20/2017 By: Skeeter Merlin No Known Allergies Current Immunizations  Reviewed on 7/16/2017 Name Date Influenza High Dose Vaccine PF 1/17/2017 Influenza Vaccine 10/1/2012 Influenza Vaccine Split 10/14/2011 Pneumococcal Conjugate (PCV-13) 7/13/2016 Pneumococcal Polysaccharide (PPSV-23)  Incomplete Pneumococcal Vaccine (Unspecified Type) 8/17/2010 Tdap 2/17/2014 Zoster 8/18/2010 Reviewed by Essie Mccrary MD on 7/16/2017 at  9:32 PM  
You Were Diagnosed With   
  
 Codes Comments Routine general medical examination at a health care facility    -  Primary ICD-10-CM: Z00.00 ICD-9-CM: V70.0 Screening for alcoholism     ICD-10-CM: Z13.89 ICD-9-CM: V79.1 Advanced directives, counseling/discussion     ICD-10-CM: Z71.89 ICD-9-CM: V65.49 Screening for depression     ICD-10-CM: Z13.89 ICD-9-CM: V79.0 Screen for colon cancer     ICD-10-CM: Z12.11 ICD-9-CM: V76.51 Screening for ischemic heart disease     ICD-10-CM: Z13.6 ICD-9-CM: V81.0 Encounter for immunization     ICD-10-CM: F59 ICD-9-CM: V03.89 Albuminuria     ICD-10-CM: R80.9 ICD-9-CM: 791.0 Chronic kidney disease (CKD) stage G3a/A3, moderately decreased glomerular filtration rate (GFR) between 45-59 mL/min/1.73 square meter and albuminuria creatinine ratio greater than 300 mg/g     ICD-10-CM: N18.3 ICD-9-CM: 585.3 Essential hypertension     ICD-10-CM: I10 
ICD-9-CM: 401.9 Controlled type 2 diabetes mellitus with microalbuminuria, without long-term current use of insulin (HCC)     ICD-10-CM: E11.29, R80.9 ICD-9-CM: 250.40, 791.0 Chronic low back pain without sciatica, unspecified back pain laterality     ICD-10-CM: M54.5, G89.29 ICD-9-CM: 724.2, 338.29   
 GERD without esophagitis     ICD-10-CM: K21.9 ICD-9-CM: 530.81 Peripheral vascular disease ([facility] [address]     ICD-10-CM: I73.9 ICD-9-CM: 443. 9 Vitals BP Pulse Temp Resp Height(growth percentile) Weight(growth percentile) 160/62 (BP 1 Location: Left arm, BP Patient Position: Sitting) 81 98.4 °F (36.9 °C) (Oral) 14 5' 9\" (1.753 m) 164 lb 12.8 oz (74.8 kg) SpO2 BMI Smoking Status 98% 24.34 kg/m2 Former Smoker Vitals History BMI and BSA Data Body Mass Index Body Surface Area  
 24.34 kg/m 2 1.91 m 2 Preferred Pharmacy Pharmacy Name Phone [address], [address] [place] [address] [phone] Your Updated Medication List  
  
   
This list is accurate as of: 7/20/17  8:41 AM.  Always use your most recent med list.  
  
  
  
  
 atorvastatin 10 mg tablet Commonly known as:  LIPITOR Take 1 Tab by mouth daily. benazepril 40 mg tablet Commonly known as:  LOTENSIN  
TAKE 1 TABLET BY MOUTH ONCE DAILY  
  
 clopidogrel 75 mg Tab Commonly known as:  PLAVIX TAKE 1 TABLET BY MOUTH ONCE DAILY  
  
 diazePAM 5 mg tablet Commonly known as:  VALIUM  
TAKE 1 TABLET BY MOUTH 2 TIMES A DAY AS NEEDED  
  
 gabapentin 600 mg tablet Commonly known as:  NEURONTIN  
TAKE 1 TABLET BY MOUTH 3 TIMES A DAY HYDROcodone-acetaminophen 5-325 mg per tablet Commonly known as:  Fredrica Araya Take 1 Tab by mouth every six (6) hours as needed for Pain.  
  
 lansoprazole 30 mg capsule Commonly known as:  PREVACID Take 1 Cap by mouth Daily (before breakfast). metFORMIN 1,000 mg tablet Commonly known as:  GLUCOPHAGE  
TAKE 1 TABLET BY MOUTH 2 TIMES A DAY WITH MEALS  
  
 triamcinolone 0.5 % topical cream  
Commonly known as:  ARISTOCORT Apply  to affected area two (2) times a day. use thin layer We Performed the Following ADMIN PNEUMOCOCCAL VACCINE [ [facility]] ADVANCE CARE PLANNING FIRST 30 MINS [60165 CPT(R)] Skyler Elliott [[id number] HCPCS] PNEUMOCOCCAL POLYSACCHARIDE VACCINE, 23-VALENT, ADULT OR IMMUNOSUPPRESSED PT DOSE, [18019 CPT(R)] Introducing [facility]! Severino Mota introduces [employer] patient portal. Now you can access parts of your medical record, email your doctor's office, and request medication refills online. 1. In your internet browser, go to [email or web address] 2. Click on the First Time User? Click Here link in the Sign In box. You will see the New Member Sign Up page. 3. Enter your [employer] Access Code exactly as it appears below. You will not need to use this code after youve completed the sign-up process. If you do not sign up before the expiration date, you must request a new code. · [employer] Access Code: [email or web address] Expires: 10/18/2017  7:48 AM 
 
4. Enter the last four digits of your Social Security Number (xxxx) and Date of Birth (mm/dd/yyyy) as indicated and click Submit. You will be taken to the next sign-up page. 5. Create a [employer] ID. This will be your [employer] login ID and cannot be changed, so think of one that is secure and easy to remember. 6. Create a [employer] password. You can change your password at any time. 7. Enter your Password Reset Question and Answer. This can be used at a later time if you forget your password. 8. Enter your e-mail address. You will receive e-mail notification when new information is available in [address] 9. Click Sign Up. You can now view and download portions of your medical record. 10. Click the Download Summary menu link to download a portable copy of your medical information. If you have questions, please visit the Frequently Asked Questions section of the [employer] website. Remember, [employer] is NOT to be used for urgent needs. For medical emergencies, dial 911. Now available from your iPhone and Android! Please provide this summary of care documentation to your next provider. Your primary care clinician is listed as Gisel Drummond. If you have any questions after today's visit, please call [phone].

## 2017-07-20 NOTE — PROGRESS NOTES
1. Have you been to the ER, urgent care clinic or hospitalized since your last visit? NO.     2. Have you seen or consulted any other health care providers outside of the [address] [place] [address] since your last visit (Include any pap smears or colon screening)? NO      Do you have an Advanced Directive?  YES

## 2017-07-20 NOTE — TELEPHONE ENCOUNTER
Dr. Lei Must, was there any reason patient has to see Dr. Minesh Atkinson or can he see any Nephrologist in the group?

## 2017-07-20 NOTE — TELEPHONE ENCOUNTER
Janessa with [facility] called and stated that they received a referral on the patient to see Dr. Trent Garcia. Per office they can not schedule a new patient appointment until the end of August. They would like to know can the patient be seen by someone else in the practice so that they can get him scheduled earlier. Please call the office at [phone].

## 2017-07-20 NOTE — PROGRESS NOTES
pls call    Changed my mind.   I will put him on another bp med, amlodipine to try to get bp to <130systolic  pls have him follow bp outside and call in readings in 2-3 weeks so we can make adjustments

## 2017-07-21 NOTE — PROGRESS NOTES
Spoke with patient, given message below that Dr. Ebony Tucker wants patient to take Amlodipine that was sent to his pharmacy and call in BP readings in 2-3 weeks. Verbalized understanding and will continue monitoring BP.

## 2017-07-21 NOTE — TELEPHONE ENCOUNTER
Spoke with Josephine at [facility], notified that it's okay for patient to see any available provider in the group. Verbalized understanding and will call the patient now to get him scheduled.

## 2017-07-23 RX ORDER — BENAZEPRIL HYDROCHLORIDE 40 MG/1
TABLET ORAL
Qty: 90 TAB | Refills: 3 | Status: SHIPPED | OUTPATIENT
Start: 2017-07-23 | End: 2018-08-03 | Stop reason: SDUPTHER

## 2017-08-10 ENCOUNTER — TELEPHONE (OUTPATIENT)
Dept: INTERNAL MEDICINE CLINIC | Age: 67
End: 2017-08-10

## 2017-08-10 NOTE — TELEPHONE ENCOUNTER
Call from Josephine from prev messages, she said pt won't be seeing Dr. Christin Iyer he will be seeing Dr. Liz Merida PKR#[phone], she doesn't know if referral has to be changed, RD

## 2017-08-10 NOTE — TELEPHONE ENCOUNTER
I updated the doctor's name on patient's referral.  Amol Reynoso, pt has [employer], does he need insurance authorization on his Nephrology referral?

## 2017-08-10 NOTE — TELEPHONE ENCOUNTER
Date         bp        pulse    7/24/2017 155/71    73  7/25/2017 154/76    95  7/26/2017 140/74    79  7/28/2017 15/70      76  7/29/2017 152/72    86  8/4/2017 136/68       89  8/5/2017 153/72       81  8/7/2017 149/85       90  8/8/2017 134/67       87  8/10/2017 129/65     83

## 2017-08-14 NOTE — TELEPHONE ENCOUNTER
Gave pt message- please call pt he went to [facility] and the increased his medication but was unsure of what meds was increased

## 2017-08-14 NOTE — TELEPHONE ENCOUNTER
[address] with Boby Lopez to notify that BP looks good and to have patient continue, no changes.

## 2017-08-17 ENCOUNTER — HOSPITAL ENCOUNTER (OUTPATIENT)
Dept: VASCULAR SURGERY | Age: 67
Discharge: HOME | End: 2017-08-17
Attending: INTERNAL MEDICINE
Payer: MEDICARE

## 2017-08-17 DIAGNOSIS — N18.30 CHRONIC KIDNEY DISEASE, STAGE III (MODERATE) (HCC): ICD-10-CM

## 2017-08-17 PROCEDURE — 93975 VASCULAR STUDY: CPT

## 2017-08-17 NOTE — PROCEDURES
Nidia 1  *** FINAL REPORT ***    Name: Mary Merrill  MRN: [id number]    Outpatient  : 1950  HIS Order #: [id number]  [address] [facility] Visit #: [id number]  Date: 17 Aug 2017    TYPE OF TEST: Visceral Arterial Duplex    REASON FOR TEST    Aortic PSV:  97.0 cm/s  Diameter AP: 2.0 cm   TV: 2.0 cm                   Right          Left  Renal Artery:- -------------  -------------  Proximal  PSV: 199.0          316.0  Mid       PSV: 109.0          212.0  Distal    PSV: 157.0          105.0  Aortic ratio :   2.1            3.3    Medullary PSV:  53.0           43.0            EDV:  12.0           11.0            EDR:   0.2            0.3            SDR:   4.4            3.9    Cortical  PSV:  28.0           26.0            EDV:   9.0           11.0            EDR:   0.3            0.4            SDR:   3.1            2.4  Stenosis:      Normal         Severe > 60%  Kidney size:   10.8 cm        11.9 cm               x  5.0 cm      x  4.6 cm    Hilar:-        Right          Left  Acc. Time  AT:   4 secs           secs  Acc. Index AI:             RI: 0.68    INTERPRETATION/FINDINGS  Duplex images were obtained using 2-D gray scale, color flow, and  spectral Doppler analysis. Small abdominal aortic aneurysm identified measuring 2.0 cm by 2.0 cm   infrarenal , therefore the visceral/aortic ratios may not accurately  predict visceral artery stenosis. RENAL:  1. No significant stenosis in the right renal artery. 2. Severe > 60% stenosis in the left renal artery. 3. The right kidney measures 10.8 cm. 4. The left kidney measures 11.9 cm.  5.  The renal vein is patent bilaterally. 6. Cyst(s) noted in the left kidney measuring 3.3 x 3.8 cm. ADDITIONAL COMMENTS    I have personally reviewed the data relevant to the interpretation of  this  study.     TECHNOLOGIST: Amber Gaming, [facility], RVT/  Signed: 2017 09:11 AM    PHYSICIAN: Jamila Alex MD  Signed: 2017 08:26 AM

## 2017-08-20 DIAGNOSIS — M19.90 OSTEOARTHRITIS, UNSPECIFIED OSTEOARTHRITIS TYPE, UNSPECIFIED SITE: ICD-10-CM

## 2017-08-21 RX ORDER — DIAZEPAM 5 MG/1
TABLET ORAL
Qty: 60 TAB | Refills: 0 | Status: SHIPPED | OUTPATIENT
Start: 2017-08-21 | End: 2017-11-06 | Stop reason: SDUPTHER

## 2017-08-22 ENCOUNTER — TELEPHONE (OUTPATIENT)
Dept: INTERNAL MEDICINE CLINIC | Age: 67
End: 2017-08-22

## 2017-09-13 RX ORDER — GABAPENTIN 600 MG/1
TABLET ORAL
Qty: 90 TAB | Refills: 5 | Status: SHIPPED | OUTPATIENT
Start: 2017-09-13 | End: 2018-03-22 | Stop reason: SDUPTHER

## 2017-11-06 DIAGNOSIS — M19.90 OSTEOARTHRITIS, UNSPECIFIED OSTEOARTHRITIS TYPE, UNSPECIFIED SITE: ICD-10-CM

## 2017-11-06 RX ORDER — DIAZEPAM 5 MG/1
5 TABLET ORAL
Qty: 60 TAB | Refills: 0 | OUTPATIENT
Start: 2017-11-06 | End: 2018-01-11 | Stop reason: SDUPTHER

## 2017-11-06 NOTE — TELEPHONE ENCOUNTER
PHONE IN RX    Last Visit: 07/20/2017 with MD Hair Schmitz    Next Appointment: 11/30/2017 with MD Hair Schmitz   Previous Refill Encounters: 08/21/2017 per MD Hair Schmitz #[id number]     Requested Prescriptions     Pending Prescriptions Disp Refills    diazePAM (VALIUM) 5 mg tablet 60 Tab 0     Sig: Take 1 Tab by mouth two (2) times daily as needed for Anxiety. Max Daily Amount: 10 mg.

## 2017-11-17 ENCOUNTER — HOSPITAL ENCOUNTER (OUTPATIENT)
Dept: LAB | Age: 67
Discharge: HOME | End: 2017-11-17
Payer: MEDICARE

## 2017-11-17 DIAGNOSIS — N18.31 CHRONIC KIDNEY DISEASE (CKD) STAGE G3A/A3, MODERATELY DECREASED GLOMERULAR FILTRATION RATE (GFR) BETWEEN 45-59 ML/MIN/1.73 SQUARE METER AND ALBUMINURIA CREATININE RATIO GREATER THAN 300 MG/G (HCC): ICD-10-CM

## 2017-11-17 DIAGNOSIS — R80.9 ALBUMINURIA: ICD-10-CM

## 2017-11-17 DIAGNOSIS — R80.9 CONTROLLED TYPE 2 DIABETES MELLITUS WITH MICROALBUMINURIA, WITHOUT LONG-TERM CURRENT USE OF INSULIN (HCC): ICD-10-CM

## 2017-11-17 DIAGNOSIS — E11.29 CONTROLLED TYPE 2 DIABETES MELLITUS WITH MICROALBUMINURIA, WITHOUT LONG-TERM CURRENT USE OF INSULIN (HCC): ICD-10-CM

## 2017-11-17 LAB
CHOLEST SERPL-MCNC: 156 MG/DL
ERYTHROCYTE [DISTWIDTH] IN BLOOD BY AUTOMATED COUNT: 14.9 % (ref 11.6–14.5)
HBA1C MFR BLD: 5.7 % (ref 4.2–5.6)
HCT VFR BLD AUTO: 37.2 % (ref 36–48)
HDLC SERPL-MCNC: 51 MG/DL (ref 40–60)
HDLC SERPL: 3.1 {RATIO} (ref 0–5)
HGB BLD-MCNC: 12 G/DL (ref 13–16)
LDLC SERPL CALC-MCNC: 80 MG/DL (ref 0–100)
LIPID PROFILE,FLP: NORMAL
MCH RBC QN AUTO: 30.2 PG (ref 24–34)
MCHC RBC AUTO-ENTMCNC: 32.3 G/DL (ref 31–37)
MCV RBC AUTO: 93.7 FL (ref 74–97)
PLATELET # BLD AUTO: 264 K/UL (ref 135–420)
PMV BLD AUTO: 10.4 FL (ref 9.2–11.8)
RBC # BLD AUTO: 3.97 M/UL (ref 4.7–5.5)
TRIGL SERPL-MCNC: 125 MG/DL (ref ?–150)
VLDLC SERPL CALC-MCNC: 25 MG/DL
WBC # BLD AUTO: 10.1 K/UL (ref 4.6–13.2)

## 2017-11-17 PROCEDURE — 83036 HEMOGLOBIN GLYCOSYLATED A1C: CPT | Performed by: INTERNAL MEDICINE

## 2017-11-17 PROCEDURE — 85027 COMPLETE CBC AUTOMATED: CPT | Performed by: INTERNAL MEDICINE

## 2017-11-17 PROCEDURE — 80061 LIPID PANEL: CPT | Performed by: INTERNAL MEDICINE

## 2017-11-17 PROCEDURE — 36415 COLL VENOUS BLD VENIPUNCTURE: CPT | Performed by: INTERNAL MEDICINE

## 2017-11-17 PROCEDURE — 82043 UR ALBUMIN QUANTITATIVE: CPT | Performed by: INTERNAL MEDICINE

## 2017-11-18 LAB
CREAT UR-MCNC: 34.78 MG/DL (ref 30–125)
MICROALBUMIN UR-MCNC: 27.1 MG/DL (ref 0–3)
MICROALBUMIN/CREAT UR-RTO: 779 MG/G (ref 0–30)

## 2017-11-23 NOTE — PROGRESS NOTES
79 y.o. white male who presents for f/u    Continues to try to be active but no set exercise. No cardiovascular complaints. No polyuria, polydipsia, nocturia, vision change or neurologic complaints. Not checking sugars regularly. Weight is stable. Sees Dr. Vaishnavi Redding but no podiatry. Vitals 11/30/2017 7/20/2017 1/17/2017 7/13/2016   Weight 174 lb 3.2 oz 164 lb 12.8 oz 167 lb 176 lb     Denies any claudication sx when he does walk. He has f/u end of the year w Dr Neela Hare    We held the ppi due to concerns about renal fxn but not much improvement and he has had worsening of his gerd on [id number] although no alarm complaints    We sent him to Dr Diego Cheatham for opinion regarding proteinuria. He suggested tighter control of the bp.   Renal duplex showed left ANTONIO>60% but he was apparently never called or referred to vascular     LAST MEDICARE WELLNESS EXAM: 7/13/16, 7/20/17      ACP 7/13/16, 7/20/17    Past Medical History:   Diagnosis Date    Anemia     fe def w gi w/u Dr Milan De Los Santos cell carcinoma     s/p resection Dr. Codie Shaw Chronic back pain inoperable Dr. Skyler Barraza and Dr. Brewster Ok Colon adenoma     and diverticulosis Dr Conrad Flor 2014    Diabetes mellitus ([facility] [address]     microalbuminuria    DJD (degenerative joint disease)     Dyshidrotic eczema     Dyslipidemia     ED (erectile dysfunction)     FHx: heart disease     Gastritis     2014 Dr Conrad Flor neg h pylori    GERD (gastroesophageal reflux disease) 2014    on EGD Dr Conrad Flor    Hypertension     Left renal artery stenosis ([facility] [address] 11/30/2017    Meniere's disease     s/p surgery Dr Kerry Bills PAD (peripheral artery disease) [facility])      Past Surgical History:   Procedure Laterality Date    CARDIAC SURG PROCEDURE UNLIST  10/10    negative thallium EF 68%    HX CHOLECYSTECTOMY  2011    Dr. Juan Miguel Burns HX COLONOSCOPY  2014    adenoma and divertics Dr Conrad Flor    HX GI  2014    pill camera non bleeding ileal AVM    HX TONSILLECTOMY      SINUS SURGERY PROC UNLISTED      left ear surgery Dr Cassi Moreno for meniere's    VASCULAR SURGERY PROCEDURE UNLIST  10/10    [facility] left Graft Surgery  Dr. Carl Roberson History     Social History    Marital status:      Spouse name: N/A    Number of children: 0    Years of education: N/A     Occupational History    ret sup [employer]      Social History Main Topics    Smoking status: Former Smoker     Packs/day: 1.00     Quit date: 10/22/2011    Smokeless tobacco: Never Used    Alcohol use No    Drug use: No    Sexual activity: Not on file     Other Topics Concern    Not on file     Social History Narrative     Current Outpatient Prescriptions   Medication Sig    aspirin 81 mg chewable tablet Take 81 mg by mouth daily.  traMADol (ULTRAM) 50 mg tablet Take 50 mg by mouth every six (6) hours as needed for Pain.  carvedilol (COREG) 6.25 mg tablet Take 1 Tab by mouth two (2) times daily (with meals).  lansoprazole (PREVACID) 30 mg capsule Take 1 Cap by mouth Daily (before breakfast).  diazePAM (VALIUM) 5 mg tablet Take 1 Tab by mouth two (2) times daily as needed for Anxiety. Max Daily Amount: 10 mg.    gabapentin (NEURONTIN) 600 mg tablet TAKE 1 TABLET BY MOUTH 3 TIMES A DAY    benazepril (LOTENSIN) 40 mg tablet TAKE 1 TABLET BY MOUTH ONCE DAILY    amLODIPine (NORVASC) 5 mg tablet Take 1 Tab by mouth daily.  atorvastatin (LIPITOR) 10 mg tablet Take 1 Tab by mouth daily.  metFORMIN (GLUCOPHAGE) 1,000 mg tablet TAKE 1 TABLET BY MOUTH 2 TIMES A DAY WITH MEALS    clopidogrel (PLAVIX) 75 mg tab TAKE 1 TABLET BY MOUTH ONCE DAILY    triamcinolone (ARISTOCORT) 0.5 % topical cream Apply  to affected area two (2) times a day. use thin layer    HYDROcodone-acetaminophen (NORCO) 5-325 mg per tablet Take 1 Tab by mouth every six (6) hours as needed for Pain. No current facility-administered medications for this visit.       No Known Allergies    REVIEW OF SYSTEMS: sees  Carmel Spicer, no podiatry, colo 2014 Dr Eda Cartagena  Ophtho  no vision change or eye pain  Oral  no mouth pain, tongue or tooth problems  Ears  no hearing loss, ear pain, fullness, no swallowing problems  Cardiac  no CP, PND, orthopnea, edema, palpitations or syncope  Chest  no breast masses  Resp  no wheezing, chronic coughing, dyspnea  GI  no heartburn, nausea, vomiting, change in bowel habits, bleeding, hemorrhoids  Urinary  no dysuria, hematuria, flank pain, urgency, frequency  Endo - no polyuria, polydipsia, nocturia, hot flashes    Visit Vitals    /72    Pulse 77    Temp 97.7 °F (36.5 °C)    Resp 14    Ht 5' 9\" (1.753 m)    Wt 174 lb 3.2 oz (79 kg)    SpO2 99%    BMI 25.72 kg/m2     A&O x3  Affect is appropriate. Mood stable  No apparent distress  Anicteric, no JVD, adenopathy or thyromegaly. No carotid bruits or radiated murmur  Lungs clear to auscultation, no wheezes or rales  Heart showed regular rate and rhythm. No murmur, rubs, gallops  Abdomen soft nontender, no hepatosplenomegaly or masses.    Ext without c/c/e    LABS  From 10/11 showed gluc 99,  cr 0.98, gfr 83, alt 43,  hba1c 5.6, ldl-p 1018, chol 124, tg 223, hdl 37, ldl-c 42, umar 11.2, tsh 2.10,          psa 0.90  From 4/12 showed                  hba1c 5.8, ldl-p 1136, chol 143, tg 188, hdl 38, ldl-c 67, umar 9.5  From 10/12 showed gluc 87,  cr 0.99, gfr 81, alt 15,  hba1c 5.6, ldl-p 500,   chol 109, tg 125, hdl 40, ldl-c 44  From 7/13 showed                  hba1c 5.7,                   chol 136, tg 137, hdl 42, ldl-c 67   From 2/14 showed                  hba1c 6.0,               chol 136, tg 72,   hdl 52, ldl-c 72, umar 347, wbc 12.4, hb 12.0, plt 240, psa 1.39  From 5/14 showed                  hba1c 6.0,               chol 135, tg 94,   hdl 45, ldl-c 71, umar 469, wbc 9.5,   hb 11.6, plt 232, psa 1.35  From 5/14 showed                                    fe 25, %sat 7, ferritin 32, b12 699, fol 16.1, spep neg  From 6/14 showed   gluc 100, cr 1.33, gfr 54  From 8/14 showed                  hba1c 6.1,               chol 137, tg 93,   hdl 46, ldl-c 98, umar 100  From 2/15 showed   gluc 100, cr 1.50, gfr 46, alt 14, hba1c 5.9,             umar 82.1, wbc 9.9,  hb 11.2, plt 203  From 9/15 showed   gluc 88,   cr 1.30,    alt 11, hba1c 5.6,    chol 135, tg 123, hdl 43, ldl-c 67, umar 159,  wbc 9.2,  hb 11.8, plt 225  From 3/16 showed   gluc 77,   cr 1.58, gfr 45,   hba1c 6.0,                  hep c neg, na 133  From 7/16 showed   gluc 96,   cr 1.41, gfr 52,   hba1c 5.9,             umar 163, wbc 9.6, hb 12.1, plt 281,                    na 131  From 1/17 showed   gluc 79,   cr 1.50, gfr 48  From 7/17 showed   gluc 92,   cr 1.57, gfr 45, alt 6,   hba1c 5.8,   chol 170, tg 135, hdl 44, ldl-c 99, umar 1043    Results for orders placed or performed during the hospital encounter of 11/17/17   MICROALBUMIN, UR, RAND W/ MICROALBUMIN/CREA RATIO   Result Value Ref Range    Microalbumin,urine random 27.10 (H) 0 - 3.0 MG/DL    Creatinine, urine 34.78 30 - 125 mg/dL    Microalbumin/Creat ratio (mg/g creat) 779 (H) 0 - 30 mg/g   LIPID PANEL   Result Value Ref Range    LIPID PROFILE          Cholesterol, total 156 <200 MG/DL    Triglyceride 125 <150 MG/DL    HDL Cholesterol 51 40 - 60 MG/DL    LDL, calculated 80 0 - 100 MG/DL    VLDL, calculated 25 MG/DL    CHOL/HDL Ratio 3.1 0 - 5.0     HEMOGLOBIN A1C W/O EAG   Result Value Ref Range    Hemoglobin A1c 5.7 (H) 4.2 - 5.6 %   CBC W/O DIFF   Result Value Ref Range    WBC 10.1 4.6 - 13.2 K/uL    RBC 3.97 (L) 4.70 - 5.50 M/uL    HGB 12.0 (L) 13.0 - 16.0 g/dL    HCT 37.2 36.0 - 48.0 %    MCV 93.7 74.0 - 97.0 FL    MCH 30.2 24.0 - 34.0 PG    MCHC 32.3 31.0 - 37.0 g/dL    RDW 14.9 (H) 11.6 - 14.5 %    PLATELET 874 878 - 988 K/uL    MPV 10.4 9.2 - 11.8 FL     Patient Active Problem List   Diagnosis Code    Hyperlipidemia E78.5    Chronic back pain inoperable Dr. Joel Al and Dr. Noman Vieira M54.9, N89.40    Peripheral vascular disease s/p left iliac bpg Dr. Canales January 10/10 I73.9    Erectile dysfunction N52.9    Arthritis, degenerative M19.90    Primary hypertension I10    GERD without esophagitis K21.9    Controlled type 2 diabetes mellitus with albuminuria E11.29, R80.9    Advance directive in chart Z78.9    Chronic kidney disease (CKD) stage G3a/A3 N18.3    Left renal artery stenosis (HCC) I70.1     Assessment and plan:  1. Ménière's. Followup Dr. Carmel Lawson prn  2. Diabetes w microalbuminuria. Well-controlled on metformin alone. 3. Hyperlipidemia. Continue current   4. Chronic back problems. Continue current   5. Vascular. F/U Dr. aCnales January 12/17 as directed  6. CKD. F/U Dr Dariela Blanco  7. HTN. Add coreg after discussing possible sfx. Opinion vascular  8. L ANTONIO. Move up appt w Dr Canales January if possible  9. Overweight. Lifestyle and dietary measures, portion control        RTC 3/18    Above conditions discussed at length and patient vocalized understanding.   All questions answered to patient satifaction

## 2017-11-30 ENCOUNTER — OFFICE VISIT (OUTPATIENT)
Dept: INTERNAL MEDICINE CLINIC | Age: 67
End: 2017-11-30

## 2017-11-30 VITALS
SYSTOLIC BLOOD PRESSURE: 156 MMHG | OXYGEN SATURATION: 99 % | DIASTOLIC BLOOD PRESSURE: 72 MMHG | TEMPERATURE: 97.7 F | HEIGHT: 69 IN | BODY MASS INDEX: 25.8 KG/M2 | HEART RATE: 77 BPM | WEIGHT: 174.2 LBS | RESPIRATION RATE: 14 BRPM

## 2017-11-30 DIAGNOSIS — E78.5 HYPERLIPIDEMIA, UNSPECIFIED HYPERLIPIDEMIA TYPE: ICD-10-CM

## 2017-11-30 DIAGNOSIS — G89.29 CHRONIC LOW BACK PAIN WITHOUT SCIATICA, UNSPECIFIED BACK PAIN LATERALITY: ICD-10-CM

## 2017-11-30 DIAGNOSIS — I73.9 PERIPHERAL VASCULAR DISEASE (HCC): ICD-10-CM

## 2017-11-30 DIAGNOSIS — E11.29 CONTROLLED TYPE 2 DIABETES MELLITUS WITH MICROALBUMINURIA, WITHOUT LONG-TERM CURRENT USE OF INSULIN (HCC): ICD-10-CM

## 2017-11-30 DIAGNOSIS — R80.9 CONTROLLED TYPE 2 DIABETES MELLITUS WITH MICROALBUMINURIA, WITHOUT LONG-TERM CURRENT USE OF INSULIN (HCC): ICD-10-CM

## 2017-11-30 DIAGNOSIS — M54.50 CHRONIC LOW BACK PAIN WITHOUT SCIATICA, UNSPECIFIED BACK PAIN LATERALITY: ICD-10-CM

## 2017-11-30 DIAGNOSIS — D64.9 ANEMIA, UNSPECIFIED TYPE: ICD-10-CM

## 2017-11-30 DIAGNOSIS — Z23 ENCOUNTER FOR IMMUNIZATION: ICD-10-CM

## 2017-11-30 DIAGNOSIS — K21.9 GERD WITHOUT ESOPHAGITIS: ICD-10-CM

## 2017-11-30 DIAGNOSIS — E66.3 OVERWEIGHT (BMI 25.0-29.9): ICD-10-CM

## 2017-11-30 DIAGNOSIS — I70.1 RENAL ARTERY STENOSIS (HCC): ICD-10-CM

## 2017-11-30 DIAGNOSIS — I10 PRIMARY HYPERTENSION: Primary | ICD-10-CM

## 2017-11-30 DIAGNOSIS — N18.31 CHRONIC KIDNEY DISEASE (CKD) STAGE G3A/A3, MODERATELY DECREASED GLOMERULAR FILTRATION RATE (GFR) BETWEEN 45-59 ML/MIN/1.73 SQUARE METER AND ALBUMINURIA CREATININE RATIO GREATER THAN 300 MG/G (HCC): ICD-10-CM

## 2017-11-30 RX ORDER — CARVEDILOL 6.25 MG/1
6.25 TABLET ORAL 2 TIMES DAILY WITH MEALS
Qty: 60 TAB | Refills: 5 | Status: SHIPPED | OUTPATIENT
Start: 2017-11-30 | End: 2018-06-08 | Stop reason: SDUPTHER

## 2017-11-30 RX ORDER — LANSOPRAZOLE 30 MG/1
30 CAPSULE, DELAYED RELEASE ORAL
Qty: 90 CAP | Refills: 3 | Status: SHIPPED | OUTPATIENT
Start: 2017-11-30 | End: 2018-11-28 | Stop reason: SDUPTHER

## 2017-11-30 RX ORDER — GUAIFENESIN 100 MG/5ML
81 LIQUID (ML) ORAL DAILY
COMMUNITY

## 2017-11-30 RX ORDER — TRAMADOL HYDROCHLORIDE 50 MG/1
50 TABLET ORAL
COMMUNITY
End: 2019-03-27

## 2017-11-30 NOTE — MR AVS SNAPSHOT
Visit Information Date & Time Provider Department Dept. Phone Encounter #  
 11/30/2017  8:00 AM Naseem Hess MD [facility] [phone] [id number] Your Appointments 12/14/2017  2:00 PM  
Nurse Visit with [facility] NURSE VISIT [facility] ([facility]) Appt Note: nv callibration for bp machine per rd  
 [address] [facility], [address] [place] [address] [place]  
  
   
 [address] [facility], [address]  
  
    
 12/26/2017  8:00 AM  
PROCEDURE with BSVVS IMAGING 2 [facility] ([facility]) Appt Note: cv lupe; WAS TOLD PATIENT WOULD BE GOING TO AN HMO BY THIS TIME SO UNABLE TO GET REFERRAL TILL INS IS CHANGED IN COMPUTER; .; CV  TK  
 [address], [place] [address] [address] [facility] [address]  
[phone] [address] [place] [address] 1M07  
  
    
 12/26/2017  9:00 AM  
PROCEDURE with BSVVS NONIMAGING [facility] ([facility]) Appt Note: leg art lupe; WAS TOLD PATIENT WOULD BE GOING TO AN HMO BY THIS TIME SO UNABLE TO GET REFERRAL TILL INS IS CHANGED IN COMPUTER; .; LESLEY TK  
 [address], [place] [address] [address] [facility] [address]  
[phone] [address]  
  
    
 1/2/2018  9:00 AM  
Follow Up with MICK Brown [facility] ([facility]) Appt Note: 2 year fu after studies at [employer] on 12/26/2017; WAS TOLD PATIENT WOULD BE GOING TO AN HMO BY THIS TIME SO UNABLE TO GET REFERRAL TILL INS IS CHANGED IN COMPUTER; .  
 [address] [place], [place] [address] [address] [facility] [address]  
[phone] [address]  
  
    
 3/30/2018  8:40 AM  
Office Visit with Naseem Hess MD  
[facility]) Appt Note: ov [address]mo. rd  
 [address] [facility], [address] [address] [address] [address] [address], [address] Upcoming Health Maintenance Date Due Pneumococcal 65+ Low/Medium Risk (2 of 2 - PPSV23) 7/13/2017 FOOT EXAM Q1 1/23/2018 EYE EXAM RETINAL OR DILATED Q1 3/17/2018 HEMOGLOBIN A1C Q6M 5/17/2018 MEDICARE YEARLY EXAM 7/21/2018 MICROALBUMIN Q1 11/17/2018 LIPID PANEL Q1 11/17/2018 GLAUCOMA SCREENING Q2Y 3/17/2019 COLONOSCOPY 6/18/2019 DTaP/Tdap/Td series (2 - Td) 2/17/2024 Allergies as of 11/30/2017  Review Complete On: 11/30/2017 By: Marli Del Valle MD  
 No Known Allergies Current Immunizations  Reviewed on 7/16/2017 Name Date Influenza High Dose Vaccine PF 11/30/2017  8:19 AM, 1/17/2017 Influenza Vaccine 10/1/2012 Influenza Vaccine Split 10/14/2011 Pneumococcal Conjugate (PCV-13) 7/13/2016 Tdap 2/17/2014 ZZZ-RETIRED (DO NOT USE) Pneumococcal Vaccine (Unspecified Type) 8/17/2010 Zoster 8/18/2010 Not reviewed this visit You Were Diagnosed With   
  
 Codes Comments Encounter for immunization    -  Primary ICD-10-CM: P56 ICD-9-CM: V03.89 Chronic kidney disease (CKD) stage G3a/A3, moderately decreased glomerular filtration rate (GFR) between 45-59 mL/min/1.73 square meter and albuminuria creatinine ratio greater than 300 mg/g     ICD-10-CM: N18.3 ICD-9-CM: 200. 3 Vitals BP Pulse Temp Resp Height(growth percentile) Weight(growth percentile) 156/72 77 97.7 °F (36.5 °C) 14 5' 9\" (1.753 m) 174 lb 3.2 oz (79 kg) SpO2 BMI Smoking Status 99% 25.72 kg/m2 Former Smoker Vitals History BMI and BSA Data Body Mass Index Body Surface Area 25.72 kg/m 2 1.96 m 2 Preferred Pharmacy Pharmacy Name Phone [address], [address] [place] [address] [phone] Your Updated Medication List  
  
   
This list is accurate as of: 11/30/17  8:45 AM.  Always use your most recent med list. amLODIPine 5 mg tablet Commonly known as:  Tad Marjorie Take 1 Tab by mouth daily. aspirin 81 mg chewable tablet Take 81 mg by mouth daily. atorvastatin 10 mg tablet Commonly known as:  LIPITOR Take 1 Tab by mouth daily. benazepril 40 mg tablet Commonly known as:  LOTENSIN  
TAKE 1 TABLET BY MOUTH ONCE DAILY  
  
 clopidogrel 75 mg Tab Commonly known as:  PLAVIX TAKE 1 TABLET BY MOUTH ONCE DAILY  
  
 diazePAM 5 mg tablet Commonly known as:  VALIUM Take 1 Tab by mouth two (2) times daily as needed for Anxiety. Max Daily Amount: 10 mg.  
  
 famotidine 20 mg tablet Commonly known as:  PEPCID Take 1 Tab by mouth two (2) times a day.  
  
 gabapentin 600 mg tablet Commonly known as:  NEURONTIN  
TAKE 1 TABLET BY MOUTH 3 TIMES A DAY HYDROcodone-acetaminophen 5-325 mg per tablet Commonly known as:  Deborah Snowden Take 1 Tab by mouth every six (6) hours as needed for Pain.  
  
 metFORMIN 1,000 mg tablet Commonly known as:  GLUCOPHAGE  
TAKE 1 TABLET BY MOUTH 2 TIMES A DAY WITH MEALS  
  
 traMADol 50 mg tablet Commonly known as:  ULTRAM  
Take 50 mg by mouth every six (6) hours as needed for Pain.  
  
 triamcinolone 0.5 % topical cream  
Commonly known as:  ARISTOCORT Apply  to affected area two (2) times a day. use thin layer We Performed the Following ADMIN INFLUENZA VIRUS VAC [ [facility]] INFLUENZA VIRUS VACCINE, HIGH DOSE SEASONAL, PRESERVATIVE FREE [59825 CPT(R)] Introducing [facility]! [employer] introduces [employer] patient portal. Now you can access parts of your medical record, email your doctor's office, and request medication refills online. 1. In your internet browser, go to [email or web address]. [facility]/Liquid Light 2. Click on the First Time User? Click Here link in the Sign In box. You will see the New Member Sign Up page. 3. Enter your [employer] Access Code exactly as it appears below.  You will not need to use this code after youve completed the sign-up process. If you do not sign up before the expiration date, you must request a new code. · [email or web address] Access Code: [email or web address] Expires: 2/28/2018  7:50 AM 
 
4. Enter the last four digits of your Social Security Number (xxxx) and Date of Birth (mm/dd/yyyy) as indicated and click Submit. You will be taken to the next sign-up page. 5. Create a [email or web address] ID. This will be your [email or web address] login ID and cannot be changed, so think of one that is secure and easy to remember. 6. Create a [email or web address] password. You can change your password at any time. 7. Enter your Password Reset Question and Answer. This can be used at a later time if you forget your password. 8. Enter your e-mail address. You will receive e-mail notification when new information is available in [address] 9. Click Sign Up. You can now view and download portions of your medical record. 10. Click the Download Summary menu link to download a portable copy of your medical information. If you have questions, please visit the Frequently Asked Questions section of the [email or web address] website. Remember, [email or web address] is NOT to be used for urgent needs. For medical emergencies, dial 911. Now available from your iPhone and Android! Please provide this summary of care documentation to your next provider. Your primary care clinician is listed as Tyson Villareal. If you have any questions after today's visit, please call [phone].

## 2017-11-30 NOTE — PROGRESS NOTES
VO from Dr. Rogerio Sue for High Dose Influenza             Given in Right Deltoid     Pt denies any acute infections at this time or fever. Pt aware of reactions and symptoms. Advised pt to stay post 15 mins to monitor for any serious reactions. No reactions noted at this time.

## 2017-11-30 NOTE — PROGRESS NOTES
1. Have you been to the ER, urgent care clinic or hospitalized since your last visit? NO.     2. Have you seen or consulted any other health care providers outside of the [facility] since your last visit (Include any pap smears or colon screening)? NO      Do you have an Advanced Directive? YES    Would you like information on Advanced Directives?  NO

## 2017-12-12 ENCOUNTER — TELEPHONE (OUTPATIENT)
Dept: INTERNAL MEDICINE CLINIC | Age: 67
End: 2017-12-12

## 2017-12-12 NOTE — TELEPHONE ENCOUNTER
[address] [place] [address], Please contact patient and see which test is being billed. Is it a certain lab? After we know what lab it is we can check the coding.     Thanks

## 2017-12-12 NOTE — TELEPHONE ENCOUNTER
Mrs. Thera Sicard is calling because Mr. Davis is getting a bill from [facility] lab for $26.90 from [address] 11/17/17. Stating she spoke to [facility] and they told her that the reason he is getting a bill is because it was coded differently than it has been in the past. She is asking us to provide new Dx codes to be resubmitted to his insurance.

## 2017-12-13 NOTE — TELEPHONE ENCOUNTER
Spoke with Mrs. Johnson and advised her to contact her insurance and find out which lab test they are not covering.

## 2017-12-13 NOTE — TELEPHONE ENCOUNTER
Attempted to contact patient. [facility] straight to [facility]. Unable to leave message.

## 2017-12-14 ENCOUNTER — CLINICAL SUPPORT (OUTPATIENT)
Dept: INTERNAL MEDICINE CLINIC | Age: 67
End: 2017-12-14

## 2017-12-14 DIAGNOSIS — I15.9 SECONDARY HYPERTENSION: Primary | ICD-10-CM

## 2017-12-14 NOTE — TELEPHONE ENCOUNTER
Ceci Zafar Clement called back stating that his labs went to [place] [address] when they should have gone to [facility]. Email sent to [facility] [place] to see if the balance can be written off.

## 2017-12-26 ENCOUNTER — OFFICE VISIT (OUTPATIENT)
Dept: VASCULAR SURGERY | Age: 67
End: 2017-12-26

## 2017-12-26 DIAGNOSIS — I73.9 PVD (PERIPHERAL VASCULAR DISEASE) (HCC): Primary | ICD-10-CM

## 2017-12-26 DIAGNOSIS — I65.29 OCCLUSION AND STENOSIS OF CAROTID ARTERY: Primary | ICD-10-CM

## 2017-12-26 NOTE — PROCEDURES
[facility]   *** FINAL REPORT ***    Name: Matthew Nails  MRN: [id number]       Outpatient  : 1950  HIS Order #: [id number]  [id number] [facility] Visit #: [id number]  Date: 26 Dec 2017    TYPE OF TEST: Cerebrovascular Duplex    REASON FOR TEST  Carotid disease    Right Carotid:-             Proximal               Mid                 Distal  cm/s  Systolic  Diastolic  Systolic  Diastolic  Systolic  Diastolic  CCA:    123.3      20.0                            98.0      16.0  Bulb:   123.0      28.0  ECA:    117.0      13.0  ICA:    113.0      24.0      119.0      24.0       97.0      25.0  ICA/CCA:  1.1       1.2    ICA Stenosis: <50%    Right Vertebral:-  Finding: Antegrade  Sys:       87.0  Cyndi:       15.0    Right Subclavian:    Left Carotid:-            Proximal                Mid                 Distal  cm/s  Systolic  Diastolic  Systolic  Diastolic  Systolic  Diastolic  CCA:    272.1      14.0                            79.0      19.0  Bulb:    84.0      23.0  ECA:     89.0      28.0  ICA:     90.0      25.0       86.0      28.0       85.0      23.0  ICA/CCA:  0.9       1.8    ICA Stenosis: <50%    Left Vertebral:-  Finding: Antegrade  Sys:       89.0  Cyndi:       28.0    Left Subclavian:    INTERPRETATION/FINDINGS  Duplex images were obtained using 2-D gray scale, color flow and  spectral doppler analysis. 1. Mild plaquing of the internal carotid arteries bilaterally in the  range of less than 50%  without evidence of a hemodynamically  significant stenosis. 2. No significant stenosis in the external carotid arteries  bilaterally. 3. Antegrade flow in both vertebral arteries. Plaque Morphology:  1. Heterogeneous plaque in the bulb and right ICA. 2. Heterogeneous plaque in the bulb and left ICA. No significant changes when compared to previous study. ADDITIONAL COMMENTS    I have personally reviewed the data relevant to the interpretation of  this  study. TECHNOLOGIST: Yesica Cleaning RVT, DOROTA  Signed: 12/26/2017 09:07 AM    PHYSICIAN: Bob Hurst D.O.   Signed: 12/26/2017 01:00 PM

## 2017-12-26 NOTE — PROGRESS NOTES
Pt here for BP check, patient brought in his home machine for calibration. BP on the machine read 158/108, manual reading was 163/100. About 20 minutes later, machine reading was 160/78, manual reading was 158/76. We did figure out there was a button on the side of the machine that takes an average of 3 readings and that was more accurate.  Pt was aware to continue current medications, limit sodium intake

## 2017-12-26 NOTE — PROCEDURES
[facility]   *** FINAL REPORT ***    Name: Piotr Kahn  MRN: [id number]       Outpatient  : 1950  HIS Order #: [id number]  [id number] [facility] Visit #: [id number]  Date: 26 Dec 2017    TYPE OF TEST: Peripheral Arterial Testing    REASON FOR TEST  Peripheral vascular dz NOS    Right Leg  Segmentals: Normal                     mmHg  Brachial         163  High thigh  Low thigh  Calf             197  Posterior tibial 133  Dorsalis pedis   157  Peroneal  Metatarsal  Toe pressure     100  Doppler:    Normal  Ankle/Brachial: 0.96    Left Leg  Segmentals: Abnormal                     mmHg  Brachial         162  High thigh  Low thigh  Calf             159  Posterior tibial 150  Dorsalis pedis   154  Peroneal  Metatarsal  Toe pressure     109  Doppler:    Normal  Ankle/Brachial: 0.94  Post exercise results:  Speed: 1.5  mph  Grade: 12  %  Duration: 5.0 MINS     Brachial  Right Ankle  PHILLIP    Left Ankle  PHILLIP    1:   170         157     0.92       116     0.68  2:   163         156     0.96       139     0.85  3:  4:  5:    INTERPRETATION/FINDINGS  Physiologic testing was performed using continuous wave doppler and  segmental pressures. 1. No evidence of significant peripheral arterial disease at rest in  the right leg. 2. Mild arterial insufficiency on the left at rest, level  undetermined. 3. The right ankle/brachial index is 0.96 and the left ankle/brachial  index is 0.94.  4. The DBI on the right is 0.61 and on the left is 0.67.  5. Completed treadmill exercise showed no evidence of arterial inflow  disease bilaterally. No significant drop on the right post exercise. Significant drop in the ankle brachial index in the left leg with  exercise, from 0.94 to 0.68, consistent with arterial occlusive  disease. ADDITIONAL COMMENTS    I have personally reviewed the data relevant to the interpretation of  this  study. TECHNOLOGIST: Alejandra Cleaning RVT, DOROTA  Signed: 2017 09:12 AM    PHYSICIAN: Shai ROBINSON  Signed: 12/26/2017 12:59 PM

## 2018-01-02 ENCOUNTER — OFFICE VISIT (OUTPATIENT)
Dept: VASCULAR SURGERY | Age: 68
End: 2018-01-02

## 2018-01-02 VITALS
HEIGHT: 69 IN | DIASTOLIC BLOOD PRESSURE: 78 MMHG | BODY MASS INDEX: 25.77 KG/M2 | WEIGHT: 174 LBS | HEART RATE: 76 BPM | SYSTOLIC BLOOD PRESSURE: 120 MMHG

## 2018-01-02 DIAGNOSIS — I73.9 PERIPHERAL VASCULAR DISEASE (HCC): Primary | ICD-10-CM

## 2018-01-02 DIAGNOSIS — I70.1 LEFT RENAL ARTERY STENOSIS (HCC): ICD-10-CM

## 2018-01-02 DIAGNOSIS — I65.23 CAROTID STENOSIS, ASYMPTOMATIC, BILATERAL: ICD-10-CM

## 2018-01-02 NOTE — PROGRESS NOTES
[address] [facility]    Chief Complaint   Patient presents with    Carotid Artery Stenosis       History and Physical    Mr Jackeline Mayo is here for a yearly follow up surveillance visit, with remote history of aorto bi fem bypass for symptomatic claudication. He has denied any recurrent claudication. He has continued to have occasional back pain but no leg concerns presently. He has continued to remain tobacco free and is on good risk factor control otherwise    He does mention concern with protein found in his urine this summer. He was ultimately sent to nephrology. Told he had stage III kidney disease. Had renal ultrasound, and was found to have about 60% renal artery stenosis (unilateral). There was never any follow up with nephrology.  He then had some repeat labs in November, and results were much improved   His BP is otherwise well controlled and no elevations in bun/cr    Past Medical History:   Diagnosis Date    Anemia     fe def w gi w/u Dr Medrano [place] cell carcinoma     s/p resection Dr. Erik Caraballo Chronic back pain inoperable Dr. Lorena Pandey and Dr. Anusha Mancini Colon adenoma     and diverticulosis Dr Wendy Cordova 2014    Diabetes mellitus ([facility]     microalbuminuria    DJD (degenerative joint disease)     Dyshidrotic eczema     Dyslipidemia     ED (erectile dysfunction)     FHx: heart disease     Gastritis     2014 Dr Wendy Cordova neg h pylori    GERD (gastroesophageal reflux disease) 2014    on EGD Dr Wendy Cordova    Hypertension     Left renal artery stenosis ([facility] 11/30/2017    Meniere's disease     s/p surgery Dr Severiano Riggins    Overweight (BMI 25.0-29.9) 11/30/2017    PAD (peripheral artery disease) ([facility]      Patient Active Problem List   Diagnosis Code    Hyperlipidemia E78.5    Chronic back pain inoperable Dr. Lorena Pandey and Dr. Jade Jones M54.9, G89.29    Peripheral vascular disease s/p left iliac bpg Dr. Joey Mtz 10/10 I73.9    Erectile dysfunction N52.9    Arthritis, degenerative M19.90    Primary hypertension I10  GERD without esophagitis K21.9    Controlled type 2 diabetes mellitus with albuminuria E11.29, R80.9    Advance directive in chart Z78.9    Chronic kidney disease (CKD) stage G3a/A3 N18.3    Left renal artery stenosis (HCC) I70.1    Overweight (BMI 25.0-29. 9) E66.3     Past Surgical History:   Procedure Laterality Date    CARDIAC SURG PROCEDURE UNLIST  10/10    negative thallium EF 68%    HX CHOLECYSTECTOMY  2011    Dr. Semaj Hook HX COLONOSCOPY  2014    adenoma and divertics Dr Gordon Palafox HX GI  2014    pill camera non bleeding ileal AVM    HX TONSILLECTOMY      SINUS SURGERY PROC UNLISTED      left ear surgery Dr Deisi Seymour for meniere's    VASCULAR SURGERY PROCEDURE UNLIST  10/10    [place] [facility] left Graft Surgery  Dr. Torie Dai     Current Outpatient Prescriptions   Medication Sig Dispense Refill    aspirin 81 mg chewable tablet Take 81 mg by mouth daily.  traMADol (ULTRAM) 50 mg tablet Take 50 mg by mouth every six (6) hours as needed for Pain.  carvedilol (COREG) 6.25 mg tablet Take 1 Tab by mouth two (2) times daily (with meals). 60 Tab 5    lansoprazole (PREVACID) 30 mg capsule Take 1 Cap by mouth Daily (before breakfast). 90 Cap 3    diazePAM (VALIUM) 5 mg tablet Take 1 Tab by mouth two (2) times daily as needed for Anxiety. Max Daily Amount: 10 mg. 60 Tab 0    gabapentin (NEURONTIN) 600 mg tablet TAKE 1 TABLET BY MOUTH 3 TIMES A DAY 90 Tab 5    benazepril (LOTENSIN) 40 mg tablet TAKE 1 TABLET BY MOUTH ONCE DAILY 90 Tab 3    amLODIPine (NORVASC) 5 mg tablet Take 1 Tab by mouth daily. 90 Tab 3    atorvastatin (LIPITOR) 10 mg tablet Take 1 Tab by mouth daily. 90 Tab 3    metFORMIN (GLUCOPHAGE) 1,000 mg tablet TAKE 1 TABLET BY MOUTH 2 TIMES A DAY WITH MEALS 180 Tab 3    clopidogrel (PLAVIX) 75 mg tab TAKE 1 TABLET BY MOUTH ONCE DAILY 30 Tab 12    triamcinolone (ARISTOCORT) 0.5 % topical cream Apply  to affected area two (2) times a day.  use thin layer 60 g 3    HYDROcodone-acetaminophen (NORCO) 5-325 mg per tablet Take 1 Tab by mouth every six (6) hours as needed for Pain. 60 Tab 1     No Known Allergies    Review of Systems    Review of Systems - History obtained from the patient  General ROS: negative  Psychological ROS: negative  Respiratory ROS: negative  Cardiovascular ROS: negative  Gastrointestinal ROS: negative  Musculoskeletal ROS: positive for - pain in back - lower  Neurological ROS: negative  Dermatological ROS: negative  Vascular ROS: no claudication or leg edema    Physical   Visit Vitals    /78 (BP 1 Location: Left arm, BP Patient Position: Sitting)    Pulse 76    Ht 5' 9\" (1.753 m)    Wt 174 lb (78.9 kg)    BMI 25.7 kg/m2     General:  Alert, cooperative, no distress. Head:  Normocephalic, without obvious abnormality, atraumatic. Eyes:     Conjunctivae/corneas clear. Pupils equal, round, reactive to light. Extraocular movements intact. Neck:         No bruits   Lungs:   Clear to auscultation bilaterally. Heart:  Regular rate and rhythm, S1, S2 normal   Extremities: Extremities normal, atraumatic, no cyanosis or edema. Pulses: 1+ and symmetric all extremities. Skin: Skin color, texture, turgor normal. No rashes or lesions. Vascular studies:  1. Mild plaquing of the internal carotid arteries bilaterally in the  range of less than 50%  without evidence of a hemodynamically  significant stenosis. 2. No significant stenosis in the external carotid arteries  bilaterally. 3. Antegrade flow in both vertebral arteries. Plaque Morphology:  1. Heterogeneous plaque in the bulb and right ICA. 2. Heterogeneous plaque in the bulb and left ICA. No significant changes when compared to previous study. 1. No evidence of significant peripheral arterial disease at rest in  the right leg. 2. Mild arterial insufficiency on the left at rest, level  undetermined.   3. The right ankle/brachial index is 0.96 and the left ankle/brachial  index is 0.94.  4. The DBI on the right is 0.61 and on the left is 0.67.  5. Completed treadmill exercise showed no evidence of arterial inflow  disease bilaterally. No significant drop on the right post exercise. Significant drop in the ankle brachial index in the left leg with  exercise, from 0.94 to 0.68, consistent with arterial occlusive  Disease    RENAL:  1. No significant stenosis in the right renal artery. 2. Severe > 60% stenosis in the left renal artery. 3. The right kidney measures 10.8 cm. 4. The left kidney measures 11.9 cm.  5.  The renal vein is patent bilaterally. 6. Cyst(s) noted in the left kidney measuring 3.3 x 3.8 cm    Impression/Plan:     ICD-10-CM ICD-9-CM    1. Peripheral vascular disease s/p left iliac bpg Dr. Lilo Olivarez 10/10 I73.9 443.9 LOWER EXT ART PVR W EXERC BILAT (TREADMILL/WALKING) AMB   2. Left renal artery stenosis (HCC) I70.1 440.1 DUPLEX RENAL ART/VEIN BILATERAL AMB   3. Carotid stenosis, asymptomatic, bilateral I65.23 433.10      433.30      Orders Placed This Encounter    DUPLEX RENAL ART/VEIN BILATERAL AMB    LOWER EXT ART PVR W EXERC BILAT (TREADMILL/WALKING) AMB     Discussed results. Carotids can continue every 2 years  Reviewed claudication. Though some mild decreases in rubens with exercise, resting rubens normal. He is fully aware of claudication symptoms, remembering what his legs felt like prior to his bypass. He states he will notify us if any changes  As for renals, though some stenosis identified, unless he has associated high and uncontrolled BPs, and/or elevated bun/cr, then would just monitor rather than treat  So will repeat leg study and renal study around November/december 2018  Will see back sooner if deemed necessary     Follow-up Disposition:  Return in about 10 months (around 11/2/2018). MICK Spivey    Portions of this note have been entered using voice recognition software.

## 2018-01-02 NOTE — MR AVS SNAPSHOT
Visit Information Date & Time Provider Department Dept. Phone Encounter #  
 1/2/2018  9:00 AM Adam Simental, [address] [facility] [phone] [id number] Follow-up Instructions Return in about 10 months (around 11/2/2018). Your Appointments 3/23/2018  9:45 AM  
LAB with C NURSE VISIT [facility] of [address] [place] [address] ([address] [place] [address]) Appt Note: labs [address], [address] [facility] [address]  
  
   
 [address], [address]  
  
    
 3/30/2018  8:40 AM  
Office Visit with Cass Chung MD  
[facility] of [address] [place] [address]) Appt Note: ov 4mo. rd  
 [address], [address] Kane Covert [address]  
  
   
 [address], [address]  
  
    
 11/6/2018  9:30 AM  
Follow Up with [facility] ([address] [place] [address]) Appt Note: follow up after studies [address] [place] [address] 200 [facility]  
[phone] [address] [place] [address] 200 [facility] [address] Upcoming Health Maintenance Date Due Pneumococcal 65+ Low/Medium Risk (2 of 2 - PPSV23) 7/13/2017 FOOT EXAM Q1 1/23/2018 EYE EXAM RETINAL OR DILATED Q1 3/17/2018 HEMOGLOBIN A1C Q6M 5/17/2018 MEDICARE YEARLY EXAM 7/21/2018 LIPID PANEL Q1 11/17/2018 GLAUCOMA SCREENING Q2Y 3/17/2019 COLONOSCOPY 6/18/2019 DTaP/Tdap/Td series (2 - Td) 2/17/2024 Allergies as of 1/2/2018  Review Complete On: 1/2/2018 By: Libertad Elaine, ANNETTEN No Known Allergies Current Immunizations  Reviewed on 11/30/2017 Name Date Influenza High Dose Vaccine PF 11/30/2017  8:19 AM, 1/17/2017 Influenza Vaccine 10/1/2012 Influenza Vaccine Split 10/14/2011 Pneumococcal Conjugate (PCV-13) 7/13/2016 Tdap 2/17/2014  ZZZ-RETIRED (DO NOT USE) Pneumococcal Vaccine (Unspecified Type) 8/17/2010 Zoster 8/18/2010 Not reviewed this visit You Were Diagnosed With   
  
 Codes Comments Peripheral vascular disease ([facility]    -  Primary ICD-10-CM: I73.9 ICD-9-CM: 443.9 Left renal artery stenosis (HCC)     ICD-10-CM: I70.1 ICD-9-CM: 440. 1 Vitals BP Pulse Height(growth percentile) Weight(growth percentile) BMI Smoking Status 120/78 (BP 1 Location: Left arm, BP Patient Position: Sitting) 76 5' 9\" (1.753 m) 174 lb (78.9 kg) 25.7 kg/m2 Former Smoker BMI and BSA Data Body Mass Index Body Surface Area 25.7 kg/m 2 1.96 m 2 Preferred Pharmacy Pharmacy Name Phone [address] [place] [address], [address] [place] [address] [phone] Your Updated Medication List  
  
   
This list is accurate as of: 1/2/18  9:36 AM.  Always use your most recent med list. amLODIPine 5 mg tablet Commonly known as:  Katia Donnell Take 1 Tab by mouth daily. aspirin 81 mg chewable tablet Take 81 mg by mouth daily. atorvastatin 10 mg tablet Commonly known as:  LIPITOR Take 1 Tab by mouth daily. benazepril 40 mg tablet Commonly known as:  LOTENSIN  
TAKE 1 TABLET BY MOUTH ONCE DAILY  
  
 carvedilol 6.25 mg tablet Commonly known as:  Semaj Dine Take 1 Tab by mouth two (2) times daily (with meals). clopidogrel 75 mg Tab Commonly known as:  PLAVIX TAKE 1 TABLET BY MOUTH ONCE DAILY  
  
 diazePAM 5 mg tablet Commonly known as:  VALIUM Take 1 Tab by mouth two (2) times daily as needed for Anxiety. Max Daily Amount: 10 mg.  
  
 gabapentin 600 mg tablet Commonly known as:  NEURONTIN  
TAKE 1 TABLET BY MOUTH 3 TIMES A DAY HYDROcodone-acetaminophen 5-325 mg per tablet Commonly known as:  Mortimer Newness Take 1 Tab by mouth every six (6) hours as needed for Pain.  
  
 lansoprazole 30 mg capsule Commonly known as:  PREVACID Take 1 Cap by mouth Daily (before breakfast). metFORMIN 1,000 mg tablet Commonly known as:  GLUCOPHAGE  
TAKE 1 TABLET BY MOUTH 2 TIMES A DAY WITH MEALS  
  
 traMADol 50 mg tablet Commonly known as:  ULTRAM  
Take 50 mg by mouth every six (6) hours as needed for Pain.  
  
 triamcinolone 0.5 % topical cream  
Commonly known as:  ARISTOCORT Apply  to affected area two (2) times a day. use thin layer Follow-up Instructions Return in about 10 months (around 11/2/2018). To-Do List   
 11/02/2018 Imaging:  DUPLEX RENAL ART/VEIN BILATERAL AMB   
  
 11/02/2018 Imaging:  LOWER EXT ART PVR W EXERC BILAT (TREADMILL/WALKING) AMB Introducing [facility]! Arias Blount introduces [employer] patient portal. Now you can access parts of your medical record, email your doctor's office, and request medication refills online. 1. In your internet browser, go to [email or web address] 2. Click on the First Time User? Click Here link in the Sign In box. You will see the New Member Sign Up page. 3. Enter your [employer] Access Code exactly as it appears below. You will not need to use this code after youve completed the sign-up process. If you do not sign up before the expiration date, you must request a new code. · [employer] Access Code: [email or web address] Expires: 2/28/2018  7:50 AM 
 
4. Enter the last four digits of your Social Security Number (xxxx) and Date of Birth (mm/dd/yyyy) as indicated and click Submit. You will be taken to the next sign-up page. 5. Create a Porter + Sailt ID. This will be your [employer] login ID and cannot be changed, so think of one that is secure and easy to remember. 6. Create a [employer] password. You can change your password at any time. 7. Enter your Password Reset Question and Answer. This can be used at a later time if you forget your password. 8. Enter your e-mail address. You will receive e-mail notification when new information is available in [address] 9. Click Sign Up. You can now view and download portions of your medical record. 10. Click the Download Summary menu link to download a portable copy of your medical information. If you have questions, please visit the Frequently Asked Questions section of the [employer] website. Remember, [employer] is NOT to be used for urgent needs. For medical emergencies, dial 911. Now available from your iPhone and Android! Please provide this summary of care documentation to your next provider. Your primary care clinician is listed as Lillette Pod. If you have any questions after today's visit, please call [phone].

## 2018-01-11 DIAGNOSIS — M19.90 OSTEOARTHRITIS, UNSPECIFIED OSTEOARTHRITIS TYPE, UNSPECIFIED SITE: ICD-10-CM

## 2018-01-12 RX ORDER — DIAZEPAM 5 MG/1
TABLET ORAL
Qty: 60 TAB | Refills: 0 | Status: SHIPPED | OUTPATIENT
Start: 2018-01-12 | End: 2018-03-01 | Stop reason: SDUPTHER

## 2018-01-15 ENCOUNTER — TELEPHONE (OUTPATIENT)
Dept: INTERNAL MEDICINE CLINIC | Age: 68
End: 2018-01-15

## 2018-01-15 DIAGNOSIS — M19.90 OSTEOARTHRITIS, UNSPECIFIED OSTEOARTHRITIS TYPE, UNSPECIFIED SITE: ICD-10-CM

## 2018-01-15 RX ORDER — DIAZEPAM 5 MG/1
TABLET ORAL
Qty: 60 TAB | Refills: 0 | Status: CANCELLED | OUTPATIENT
Start: 2018-01-15

## 2018-02-14 PROBLEM — R55 SYNCOPE AND COLLAPSE: Status: ACTIVE | Noted: 2018-02-14

## 2018-02-16 ENCOUNTER — PATIENT OUTREACH (OUTPATIENT)
Dept: INTERNAL MEDICINE CLINIC | Age: 68
End: 2018-02-16

## 2018-02-16 NOTE — PROGRESS NOTES
Yaw Nguyen is a 79 y.o. male   This patient was received as a referral from inpatient admission     Contact made: within 2 business days post discharge    Patient admitted to [facility] on 2/14/2018 to 2/15/2018, for syncope and collapse. Patient verified two patient identifiers. Introduced self, role and reason for call. Patient presenting symptoms:   Fall  Syncope    Patient denies:  SOB  Dizziness  Lightheadedness  Numbness  Tingling  Change in vision  Fever  Chills  Nausea  Vomiting     Patient reports:  Confirmed follow up with Dr. Laina Lake  Do not monitor blood pressure of sugar  Denies any complaints  Reports a history of vertigo and arthritis  Discomfort the upper back, shoulder area     Barriers:   Financial: None identified at this time   Patients comprehension of disease: Patient verbalizes understanding of discharge plan and special follow up. Transportation: Self    Resources:  Spouse    DME:  None    ADL's:  Patient is independent of all ADL's. Patient reported the following on ADL's:  Feeds self: YES  Ambulates: YES      Self-grooming: YES  Toileting: YES    Plan of care:  1. Take medications as prescribed  2. Attend all follow up appointments  3. Fall precaution     Adherence to previous treatment and likelihood for follow up:  Patient verbalizes understanding of discharge plan and special follow up. Appointments:  [place] [id number] 2/22/2018 with Dr. Keagan Otero medications prescribed:  None    Advance Care Planning:   Advance directive on file  Medical power of   General power of      Utilization in the past 12 months:  1 hospitalization and 0 ED    RRAT score:   Not available     Patient verbalizes understanding self-management of medications, and when to seek medical attention from PCP. Patient verbalized understanding of all information discussed. Patient instructed to bring all medications with them to their next appointment.     Patient was given the opportunity to ask questions. Contact information was provided for future reference or further questions. Goals      Attends follow-up appointments as directed. Follow up with Dr. Malathi Guzman, 2/22/2018       Prevent complications post hospitalization.             No admissions within 30 days post discharge, 2/15/2018

## 2018-02-22 ENCOUNTER — OFFICE VISIT (OUTPATIENT)
Dept: INTERNAL MEDICINE CLINIC | Age: 68
End: 2018-02-22

## 2018-02-22 ENCOUNTER — PATIENT OUTREACH (OUTPATIENT)
Dept: INTERNAL MEDICINE CLINIC | Age: 68
End: 2018-02-22

## 2018-02-22 VITALS
WEIGHT: 178 LBS | OXYGEN SATURATION: 97 % | HEART RATE: 70 BPM | SYSTOLIC BLOOD PRESSURE: 132 MMHG | HEIGHT: 69 IN | TEMPERATURE: 98 F | DIASTOLIC BLOOD PRESSURE: 70 MMHG | BODY MASS INDEX: 26.36 KG/M2 | RESPIRATION RATE: 14 BRPM

## 2018-02-22 DIAGNOSIS — R55 VASOVAGAL SYNCOPE: Primary | ICD-10-CM

## 2018-02-22 NOTE — PROGRESS NOTES
Patient attended their post discharge follow up appointment with Dr. Sudarshan Boucher as scheduled.

## 2018-02-22 NOTE — MR AVS SNAPSHOT
[address] [facility] [address] 
 
 
 [address], [address] [place] [address] [facility] 
[phone] Patient: Sukhdev Marcos MRN: [id number] OZO:0/80/1961 Visit Information Date & Time Provider Department Dept. Phone Encounter #  
 2/22/2018 10:40 AM Sera Hampton MD [facility] of [address] [place] [address] [phone] Your Appointments 3/23/2018  9:45 AM  
LAB with IOC NURSE VISIT [facility] of [address] [place] [address] ([address] [facility]) Appt Note: labs [address], Suite [facility] [address]  
  
   
 [address], [address]  
  
    
 3/30/2018  8:40 AM  
Office Visit with Sera Hampton MD  
[facility] of [address] [place] [address]) Appt Note: ov 4mo. rd  
 [address], [address] [place] [address] [address]  
  
   
 [address] [facility], [address]  
  
    
 11/1/2018  8:00 AM  
PROCEDURE with [facility] IMAGING 2 [facility] ([address] [place] [address]) Appt Note: RENAL [address] [facility] [place] [address] [place], [place] [address] [address] [place] [address]  
[phone] [address] [place] [address]  
  
    
 11/1/2018  9:00 AM  
PROCEDURE with [facility] NONIMAGING [facility] ([address] [place] [address]) Appt Note: LEG ART  [address] [place], [place] [address] 706 [facility]  
[phone] [facility] [address]  
  
    
 11/6/2018  9:30 AM  
Follow Up with MICK Coronado [facility] ([address] [place] [address]) Appt Note: follow up after studies [facility] [facility] [facility]  
[phone] 23[address] [facility] 706 [facility] Upcoming Health Maintenance Date Due Pneumococcal 65+ Low/Medium Risk (2 of 2 - PPSV23) 7/13/2017 FOOT EXAM Q1 1/23/2018 EYE EXAM RETINAL OR DILATED Q1 3/17/2018 HEMOGLOBIN A1C Q6M 5/17/2018 MEDICARE YEARLY EXAM 7/21/2018 LIPID PANEL Q1 11/17/2018 GLAUCOMA SCREENING Q2Y 3/17/2019 COLONOSCOPY 6/18/2019 DTaP/Tdap/Td series (2 - Td) 2/17/2024 Allergies as of 2/22/2018  Review Complete On: 2/22/2018 By: Jimenez Cervantes No Known Allergies Current Immunizations  Reviewed on 11/30/2017 Name Date Influenza High Dose Vaccine PF 11/30/2017  8:19 AM, 1/17/2017 Influenza Vaccine 10/1/2012 Influenza Vaccine Split 10/14/2011 Pneumococcal Conjugate (PCV-13) 7/13/2016 Tdap 2/17/2014 ZZZ-RETIRED (DO NOT USE) Pneumococcal Vaccine (Unspecified Type) 8/17/2010 Zoster 8/18/2010 Not reviewed this visit Vitals BP Pulse Temp Resp Height(growth percentile) Weight(growth percentile) 132/70 70 98 °F (36.7 °C) (Oral) 14 5' 9\" (1.753 m) 178 lb (80.7 kg) SpO2 BMI Smoking Status 97% 26.29 kg/m2 Former Smoker Vitals History BMI and BSA Data Body Mass Index Body Surface Area  
 26.29 kg/m 2 1.98 m 2 Preferred Pharmacy Pharmacy Name Phone [address] [place] [address], [address] [place] [address] [phone] Your Updated Medication List  
  
   
This list is accurate as of 2/22/18 11:27 AM.  Always use your most recent med list. amLODIPine 5 mg tablet Commonly known as:  Dixon Pique Take 1 Tab by mouth daily. aspirin 81 mg chewable tablet Take 81 mg by mouth daily. atorvastatin 10 mg tablet Commonly known as:  LIPITOR Take 1 Tab by mouth daily. benazepril 40 mg tablet Commonly known as:  LOTENSIN  
TAKE 1 TABLET BY MOUTH ONCE DAILY  
  
 carvedilol 6.25 mg tablet Commonly known as:  Pita Rings Take 1 Tab by mouth two (2) times daily (with meals). clopidogrel 75 mg Tab Commonly known as:  PLAVIX TAKE 1 TABLET BY MOUTH ONCE DAILY  
  
 diazePAM 5 mg tablet Commonly known as:  VALIUM  
TAKE 1 TABLET BY MOUTH 2 TIMES A DAY AS NEEDED  
  
 gabapentin 600 mg tablet Commonly known as:  NEURONTIN  
TAKE 1 TABLET BY MOUTH 3 TIMES A DAY HYDROcodone-acetaminophen 5-325 mg per tablet Commonly known as:  Clemetine Kulm Take 1 Tab by mouth every six (6) hours as needed for Pain.  
  
 lansoprazole 30 mg capsule Commonly known as:  PREVACID Take 1 Cap by mouth Daily (before breakfast). metFORMIN 1,000 mg tablet Commonly known as:  GLUCOPHAGE  
TAKE 1 TABLET BY MOUTH 2 TIMES A DAY WITH MEALS  
  
 traMADol 50 mg tablet Commonly known as:  ULTRAM  
Take 50 mg by mouth every six (6) hours as needed for Pain.  
  
 triamcinolone 0.5 % topical cream  
Commonly known as:  ARISTOCORT Apply  to affected area two (2) times a day. use thin layer Introducing [facility] SERVICES! Cris Armijo introduces [employer] patient portal. Now you can access parts of your medical record, email your doctor's office, and request medication refills online. 1. In your internet browser, go to [email or web address] 2. Click on the First Time User? Click Here link in the Sign In box. You will see the New Member Sign Up page. 3. Enter your [employer] Access Code exactly as it appears below. You will not need to use this code after youve completed the sign-up process. If you do not sign up before the expiration date, you must request a new code. · [employer] Access Code: [email or web address] Expires: 2/28/2018  7:50 AM 
 
4. Enter the last four digits of your Social Security Number (xxxx) and Date of Birth (mm/dd/yyyy) as indicated and click Submit. You will be taken to the next sign-up page. 5. Create a Cequenst ID. This will be your [employer] login ID and cannot be changed, so think of one that is secure and easy to remember. 6. Create a Cequenst password. You can change your password at any time. 7. Enter your Password Reset Question and Answer. This can be used at a later time if you forget your password. 8. Enter your e-mail address. You will receive e-mail notification when new information is available in [address] 9. Click Sign Up. You can now view and download portions of your medical record. 10. Click the Download Summary menu link to download a portable copy of your medical information. If you have questions, please visit the Frequently Asked Questions section of the [employer] website. Remember, [employer] is NOT to be used for urgent needs. For medical emergencies, dial 911. Now available from your iPhone and Android! Please provide this summary of care documentation to your next provider. Your primary care clinician is listed as Vishnu Cheney. If you have any questions after today's visit, please call [phone].

## 2018-02-22 NOTE — PROGRESS NOTES
Goals Addressed             Most Recent     COMPLETED: Attends follow-up appointments as directed. On track (2/22/2018)             Follow up with Dr. Christine Velasco, 2/22/2018   Patient attended their post discharge follow up appointment with Dr. Christine Velasco as scheduled.

## 2018-02-22 NOTE — PROGRESS NOTES
Patient being seen today for transitional care management    Patient was admitted to [facility] from 2/14-15/17              Initial interactive contact was done by nurse navigator Homar Marrufo I thoroughly reviewed the discharge summary, notes, consults, labs and imaging studies in the electronic record. Pertinent details are summarized below and the record has been updated to reflect recent events    He was taken by EMS to [facility] after an episode of syncope that was witnessed by his wife. He apparently had been bending over trying to reach for a soda in the lower part of the refrigerator when he passed out. He was out for about 5 minutes by report. Labs neg, CT head negative outside of mild volume loss, labs otherwise okay, echo showed normal LV, no valvular disease or cardiomyopathy, monitoring was negative. Carotids had been done 12/17 and negative  He was felt to have had the episode due to vasovagal reasons.   Since discharge, he has done well and no complaints    Past Medical History:   Diagnosis Date    Anemia     fe def w gi w/u Dr Malorie Ruiz cell carcinoma     s/p resection Dr. Marino Guerrero Chronic back pain inoperable Dr. Arya Butler and Dr. Jenn Armendariz Colon adenoma     and diverticulosis Dr Johanna Taylor 2014    Diabetes mellitus ([address]     microalbuminuria    DJD (degenerative joint disease)     Dyshidrotic eczema     Dyslipidemia     ED (erectile dysfunction)     FHx: heart disease     Gastritis     2014 Dr Johanna Taylor neg h pylori    GERD (gastroesophageal reflux disease) 2014    on EGD Dr Johanna Taylor    Hypertension     Left renal artery stenosis ([address] 11/30/2017    Meniere's disease     s/p surgery Dr Jimenez Said    Overweight (BMI 25.0-29.9) 11/30/2017    PAD (peripheral artery disease) ([address]      Past Surgical History:   Procedure Laterality Date    CARDIAC SURG PROCEDURE UNLIST  10/10    NST neg EF 68%    CARDIAC SURG PROCEDURE UNLIST  02/2018    echo nl LV, ef 62%, mild mr/tr/pr, rvp [address] 2011    Dr. Elizabeth Ash HX COLONOSCOPY  2014    adenoma and divertics Dr Katalina John  2014    pill camera non bleeding ileal AVM    HX TONSILLECTOMY      SINUS SURGERY PROC UNLISTED      left ear surgery Dr Karie Milian for meniere's    VASCULAR SURGERY PROCEDURE UNLIST  10/10    [place] [facility] left Graft Surgery  Dr. Ming Calderon History     Social History    Marital status:      Spouse name: N/A    Number of children: 0    Years of education: N/A     Occupational History    ret sup [employer]      Social History Main Topics    Smoking status: Former Smoker     Packs/day: 1.00     Quit date: 10/22/2011    Smokeless tobacco: Never Used    Alcohol use No    Drug use: No    Sexual activity: Not on file     Other Topics Concern    Not on file     Social History Narrative     Current Outpatient Prescriptions   Medication Sig    diazePAM (VALIUM) 5 mg tablet TAKE 1 TABLET BY MOUTH 2 TIMES A DAY AS NEEDED    aspirin 81 mg chewable tablet Take 81 mg by mouth daily.  traMADol (ULTRAM) 50 mg tablet Take 50 mg by mouth every six (6) hours as needed for Pain.  carvedilol (COREG) 6.25 mg tablet Take 1 Tab by mouth two (2) times daily (with meals).  lansoprazole (PREVACID) 30 mg capsule Take 1 Cap by mouth Daily (before breakfast).  gabapentin (NEURONTIN) 600 mg tablet TAKE 1 TABLET BY MOUTH 3 TIMES A DAY    benazepril (LOTENSIN) 40 mg tablet TAKE 1 TABLET BY MOUTH ONCE DAILY    amLODIPine (NORVASC) 5 mg tablet Take 1 Tab by mouth daily.  atorvastatin (LIPITOR) 10 mg tablet Take 1 Tab by mouth daily.  metFORMIN (GLUCOPHAGE) 1,000 mg tablet TAKE 1 TABLET BY MOUTH 2 TIMES A DAY WITH MEALS    clopidogrel (PLAVIX) 75 mg tab TAKE 1 TABLET BY MOUTH ONCE DAILY    triamcinolone (ARISTOCORT) 0.5 % topical cream Apply  to affected area two (2) times a day.  use thin layer    HYDROcodone-acetaminophen (NORCO) 5-325 mg per tablet Take 1 Tab by mouth every six (6) hours as needed for Pain. No current facility-administered medications for this visit. No Known Allergies    Visit Vitals    /70    Pulse 70    Temp 98 °F (36.7 °C) (Oral)    Resp 14    Ht 5' 9\" (1.753 m)    Wt 178 lb (80.7 kg)    SpO2 97%    BMI 26.29 kg/m2   A&O x3  Affect is appropriate. Mood stable  No apparent distress  Anicteric, no JVD, adenopathy or thyromegaly. No carotid bruits or radiated murmur  Lungs clear to auscultation, no wheezes or rales  Heart showed regular rate and rhythm. No murmur, rubs, gallops  Abdomen soft nontender, no hepatosplenomegaly or masses. Extremities without edema. Pulses 1-2+ symmetrically  Probable vasovagal syncope. Assessment and plan:  1. Long discussion about syncope in general.  If he has more episodes, he will call back so we can proceed with further cardiac and even neurologic workup. Above conditions discussed at length and patient vocalized understanding.   All questions answered to patient satisfaction

## 2018-02-22 NOTE — PROGRESS NOTES
1. Have you been to the ER, urgent care clinic or hospitalized since your last visit? YES. 2/14/18-2/15/18 [address] [facility]    2. Have you seen or consulted any other health care providers outside of the [address] [place] [address] since your last visit (Include any pap smears or colon screening)?  NO

## 2018-02-26 ENCOUNTER — TELEPHONE (OUTPATIENT)
Dept: INTERNAL MEDICINE CLINIC | Age: 68
End: 2018-02-26

## 2018-02-26 DIAGNOSIS — M62.838 MUSCLE SPASM: Primary | ICD-10-CM

## 2018-02-26 RX ORDER — CYCLOBENZAPRINE HCL 10 MG
10 TABLET ORAL
Qty: 30 TAB | Refills: 1 | Status: SHIPPED | OUTPATIENT
Start: 2018-02-26 | End: 2019-02-04 | Stop reason: ALTCHOICE

## 2018-02-26 NOTE — TELEPHONE ENCOUNTER
Wife called-  Pt was [place] Thursday- you were to  Call in muscle relaxer to [facility]- it has not been called in- tylenol is not helping

## 2018-02-27 NOTE — TELEPHONE ENCOUNTER
Pt's wife called and stated tht his insurance  [employer] doesn't cover flexeril, so they paid the $11 for it, but was told if RD signs pre-auth form and sends back they will cover it and reimburse them

## 2018-03-01 DIAGNOSIS — M19.90 OSTEOARTHRITIS, UNSPECIFIED OSTEOARTHRITIS TYPE, UNSPECIFIED SITE: ICD-10-CM

## 2018-03-01 RX ORDER — DIAZEPAM 5 MG/1
5 TABLET ORAL
Qty: 60 TAB | Refills: 0 | OUTPATIENT
Start: 2018-03-01 | End: 2018-04-20 | Stop reason: SDUPTHER

## 2018-03-01 NOTE — TELEPHONE ENCOUNTER
PHONE IN [facility] reports the last fill date for Valium as 01/15/2018 for a 30 d/s. There appears to be no inconsistencies in regards to the prescribing of this medication. Last Visit: 02/22/2018 with MD Julio Nieto    Next Appointment: 03/30/2018 with MD Julio Nieto   Previous Refill Encounters: 01/12/2018 per MD Julio Nieto #[id number]     Requested Prescriptions     Pending Prescriptions Disp Refills    diazePAM (VALIUM) 5 mg tablet 60 Tab 0     Sig: Take 1 Tab by mouth two (2) times daily as needed for Anxiety. Max Daily Amount: 10 mg.

## 2018-03-20 ENCOUNTER — PATIENT OUTREACH (OUTPATIENT)
Dept: INTERNAL MEDICINE CLINIC | Age: 68
End: 2018-03-20

## 2018-03-20 NOTE — PROGRESS NOTES
Goals Addressed             Most Recent     COMPLETED: Prevent complications post hospitalization.    On track (3/20/2018)             No admissions within 30 days post discharge, 2/15/2018  Episode closed: no hospitalization or ED admission post 30 days from discharge

## 2018-03-22 ENCOUNTER — APPOINTMENT (OUTPATIENT)
Dept: INTERNAL MEDICINE CLINIC | Age: 68
End: 2018-03-22

## 2018-03-22 RX ORDER — GABAPENTIN 600 MG/1
TABLET ORAL
Qty: 90 TAB | Refills: 5 | Status: SHIPPED | OUTPATIENT
Start: 2018-03-22 | End: 2018-09-30 | Stop reason: SDUPTHER

## 2018-03-23 LAB
ALBUMIN/CREAT UR: 275.7 MG/G CREAT (ref 0–30)
BUN SERPL-MCNC: 24 MG/DL (ref 8–27)
BUN/CREAT SERPL: 14 (ref 10–24)
CALCIUM SERPL-MCNC: 9.2 MG/DL (ref 8.6–10.2)
CHLORIDE SERPL-SCNC: 98 MMOL/L (ref 96–106)
CO2 SERPL-SCNC: 24 MMOL/L (ref 18–29)
CREAT SERPL-MCNC: 1.69 MG/DL (ref 0.76–1.27)
CREAT UR-MCNC: 144.3 MG/DL
GFR SERPLBLD CREATININE-BSD FMLA CKD-EPI: 41 ML/MIN/1.73
GFR SERPLBLD CREATININE-BSD FMLA CKD-EPI: 48 ML/MIN/1.73
GLUCOSE SERPL-MCNC: 82 MG/DL (ref 65–99)
HBA1C MFR BLD: 5.7 % (ref 4.8–5.6)
INTERPRETATION: NORMAL
Lab: NORMAL
MICROALBUMIN UR-MCNC: 397.8 UG/ML
POTASSIUM SERPL-SCNC: 4.8 MMOL/L (ref 3.5–5.2)
SODIUM SERPL-SCNC: 137 MMOL/L (ref 134–144)

## 2018-03-26 NOTE — PROGRESS NOTES
79 y.o. white male who presents for f/u    Continues to try to be active but no set exercise. No cardiovascular complaints. No polyuria, polydipsia, nocturia, vision change or neurologic complaints. Not checking sugars regularly. Weight is stable. Sees Dr. Conchita Espino 3/30/2018 2/22/2018   Weight 168 lb 178 lb     Vitals 2/14/2018 1/2/2018 11/30/2017 7/20/2017   Weight 174 lb 174 lb 174 lb 3.2 oz 164 lb 12.8 oz     Denies any claudication sx when he does walk.  We sent him back for opinion regarding L ANTONIO but observation was the recommendation per MICK Rosas    No gi issues    He is seeing Dr Ronaldo Cosme periodically and no new recs    LAST MEDICARE WELLNESS EXAM: 7/13/16, 7/20/17      ACP 7/13/16, 7/20/17    Past Medical History:   Diagnosis Date    Anemia     fe def w gi w/u Dr Melania Rico cell carcinoma     s/p resection Dr. Jennifer Gipson Chronic back pain inoperable Dr. Zohra Turner and Dr. Caal  Colon adenoma     and diverticulosis Dr Kwame Og 2014    Diabetes mellitus ([address]     microalbuminuria    DJD (degenerative joint disease)     Dyshidrotic eczema     Dyslipidemia     ED (erectile dysfunction)     FHx: heart disease     Gastritis     2014 Dr Kwame Og neg h pylori    GERD (gastroesophageal reflux disease) 2014    on EGD Dr Kwame Og    Hypertension     Left renal artery stenosis ([address] 11/30/2017    Meniere's disease     s/p surgery Dr Carissa Delarosa    Overweight (BMI 25.0-29.9) 11/30/2017    PAD (peripheral artery disease) ([facility]      Past Surgical History:   Procedure Laterality Date    CARDIAC SURG PROCEDURE UNLIST  10/10    NST neg EF 68%    CARDIAC SURG PROCEDURE UNLIST  02/2018    echo nl LV, ef 62%, mild mr/tr/pr, rvp [facility]    HX CHOLECYSTECTOMY  2011    Dr. Frankie Bird HX COLONOSCOPY  2014    adenoma and divertics Dr Kwame Og    HX GI  2014    pill camera non bleeding ileal AVM    HX TONSILLECTOMY      SINUS SURGERY PROC UNLISTED      left ear surgery Dr Carissa Delarosa for meniere's  VASCULAR SURGERY PROCEDURE UNLIST  10/10    [facility] left Graft Surgery  Dr. Brenda Gutierrez History     Social History    Marital status:      Spouse name: N/A    Number of children: 0    Years of education: N/A     Occupational History    ret sup [employer]      Social History Main Topics    Smoking status: Former Smoker     Packs/day: 1.00     Quit date: 10/22/2011    Smokeless tobacco: Never Used    Alcohol use No    Drug use: No    Sexual activity: Not on file     Other Topics Concern    Not on file     Social History Narrative     Current Outpatient Prescriptions   Medication Sig    acetaminophen (TYLENOL) 325 mg tablet Take  by mouth every four (4) hours as needed for Pain.  gabapentin (NEURONTIN) 600 mg tablet TAKE 1 TABLET BY MOUTH 3 TIMES A DAY    diazePAM (VALIUM) 5 mg tablet Take 1 Tab by mouth two (2) times daily as needed for Anxiety. Max Daily Amount: 10 mg.    cyclobenzaprine (FLEXERIL) 10 mg tablet Take 1 Tab by mouth three (3) times daily as needed for Muscle Spasm(s).  aspirin 81 mg chewable tablet Take 81 mg by mouth daily.  traMADol (ULTRAM) 50 mg tablet Take 50 mg by mouth every six (6) hours as needed for Pain.  carvedilol (COREG) 6.25 mg tablet Take 1 Tab by mouth two (2) times daily (with meals).  lansoprazole (PREVACID) 30 mg capsule Take 1 Cap by mouth Daily (before breakfast).  benazepril (LOTENSIN) 40 mg tablet TAKE 1 TABLET BY MOUTH ONCE DAILY    amLODIPine (NORVASC) 5 mg tablet Take 1 Tab by mouth daily.  atorvastatin (LIPITOR) 10 mg tablet Take 1 Tab by mouth daily.  metFORMIN (GLUCOPHAGE) 1,000 mg tablet TAKE 1 TABLET BY MOUTH 2 TIMES A DAY WITH MEALS    clopidogrel (PLAVIX) 75 mg tab TAKE 1 TABLET BY MOUTH ONCE DAILY    triamcinolone (ARISTOCORT) 0.5 % topical cream Apply  to affected area two (2) times a day. use thin layer     No current facility-administered medications for this visit.       No Known Allergies    REVIEW OF SYSTEMS: sees Dr. Noemi Araiza, no podiatry, colo 2014 Dr Luci Tanner  Ophtho  no vision change or eye pain  Oral  no mouth pain, tongue or tooth problems  Ears  no hearing loss, ear pain, fullness, no swallowing problems  Cardiac  no CP, PND, orthopnea, edema, palpitations or syncope  Chest  no breast masses  Resp  no wheezing, chronic coughing, dyspnea  GI  no heartburn, nausea, vomiting, change in bowel habits, bleeding, hemorrhoids  Urinary  no dysuria, hematuria, flank pain, urgency, frequency  Endo - no polyuria, polydipsia, nocturia, hot flashes    Visit Vitals    /68 (BP 1 Location: Right arm, BP Patient Position: Sitting)    Pulse 71    Temp 97.9 °F (36.6 °C) (Oral)    Resp 14    Ht 5' 9\" (1.753 m)    Wt 168 lb (76.2 kg)    SpO2 98%    BMI 24.81 kg/m2     A&O x3  Affect is appropriate. Mood stable  No apparent distress  Anicteric, no JVD, adenopathy or thyromegaly. No carotid bruits or radiated murmur  Lungs clear to auscultation, no wheezes or rales  Heart showed regular rate and rhythm. No murmur, rubs, gallops  Abdomen soft nontender, no hepatosplenomegaly or masses.    Foot exam bilaterally showed  Reflexes 2+   Vibration, proprioception, filament test intact  Pulses 1+ DP and PT    LABS  From 10/11 showed gluc 99,  cr 0.98, gfr 83, alt 43,  hba1c 5.6, ldl-p 1018, chol 124, tg 223, hdl 37, ldl-c 42, umar 11.2, tsh 2.10,          psa 0.90  From 4/12 showed                  hba1c 5.8, ldl-p 1136, chol 143, tg 188, hdl 38, ldl-c 67, umar 9.5  From 10/12 showed gluc 87,  cr 0.99, gfr 81, alt 15,  hba1c 5.6, ldl-p 500,   chol 109, tg 125, hdl 40, ldl-c 44  From 7/13 showed                  hba1c 5.7,                   chol 136, tg 137, hdl 42, ldl-c 67   From 2/14 showed                  hba1c 6.0,               chol 136, tg 72,   hdl 52, ldl-c 72, umar 347,  wbc 12.4, hb 12.0, plt 240, psa 1.39  From 5/14 showed                  hba1c 6.0,               chol 135, tg 94,   hdl 45, ldl-c 71, umar 469,  wbc 9.5,   hb 11.6, plt 232, psa 1.35  From 5/14 showed                                      fe 25, %sat 7, ferritin 32, b12 699, fol 16.1, spep neg  From 6/14 showed   gluc 100, cr 1.33, gfr 54  From 8/14 showed                  hba1c 6.1,               chol 137, tg 93,   hdl 46, ldl-c 98, umar 100  From 2/15 showed   gluc 100, cr 1.50, gfr 46, alt 14, hba1c 5.9,             umar 82.1,  wbc 9.9,   hb 11.2, plt 203  From 9/15 showed   gluc 88,   cr 1.30,    alt 11, hba1c 5.6,    chol 135, tg 123, hdl 43, ldl-c 67, umar 159,   wbc 9.2,   hb 11.8, plt 225  From 3/16 showed   gluc 77,   cr 1.58, gfr 45,   hba1c 6.0,                    hep c neg, na 133  From 7/16 showed   gluc 96,   cr 1.41, gfr 52,   hba1c 5.9,             umar 163,   wbc 9.6,  hb 12.1, plt 281,                    na 131  From 1/17 showed   gluc 79,   cr 1.50, gfr 48  From 7/17 showed   gluc 92,   cr 1.57, gfr 45, alt 6,   hba1c 5.8,   chol 170, tg 135, hdl 44, ldl-c 99, umar 1043  From 11/17 showed      hba1c 5.7,   chol 156, tg 125, hdl 51, ldl-c 80, umar 779,   wbc 10.1, hb 12.0, plt 264  From 2/18 showed   gluc 83,   cr 1.40, gfr 54,                       wbc 6.2,   hb 9.8,   plt 216, fe 66 ferritin 58.6, b12 376  From 3/18 showed   gluc 82,   cr 1.69, gfr 41,   hba1c 5.7,            umar 275    Patient Active Problem List   Diagnosis Code    Hyperlipidemia E78.5    Chronic back pain inoperable Dr. Alec Longo and Dr. Zuleika Mills M54.9, G89.29    Peripheral vascular disease s/p left iliac bpg Dr. 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 9) E66.3     Assessment and plan:  1. Diane's.  Followup Dr. Adonis Villa prn  2. Diabetes w microalbuminuria. Well-controlled on metformin alone although may need to change over to different class in near future  3. Hyperlipidemia. Continue current   4. Chronic back problems. Continue current   5. Vascular. F/U Dr. Lilo Olivarez as directed  6. CKD. F/U Dr Viraj Park  7. HTN. Continue current regimen. 8. Overweight. Lifestyle and dietary measures, portion control        RTC 7/18    Above conditions discussed at length and patient vocalized understanding.   All questions answered to patient satifaction

## 2018-03-30 ENCOUNTER — OFFICE VISIT (OUTPATIENT)
Dept: INTERNAL MEDICINE CLINIC | Age: 68
End: 2018-03-30

## 2018-03-30 VITALS
OXYGEN SATURATION: 98 % | SYSTOLIC BLOOD PRESSURE: 120 MMHG | HEART RATE: 71 BPM | RESPIRATION RATE: 14 BRPM | TEMPERATURE: 97.9 F | BODY MASS INDEX: 24.88 KG/M2 | HEIGHT: 69 IN | DIASTOLIC BLOOD PRESSURE: 68 MMHG | WEIGHT: 168 LBS

## 2018-03-30 DIAGNOSIS — E78.5 HYPERLIPIDEMIA, UNSPECIFIED HYPERLIPIDEMIA TYPE: ICD-10-CM

## 2018-03-30 DIAGNOSIS — M54.50 CHRONIC LOW BACK PAIN WITHOUT SCIATICA, UNSPECIFIED BACK PAIN LATERALITY: ICD-10-CM

## 2018-03-30 DIAGNOSIS — G89.29 CHRONIC LOW BACK PAIN WITHOUT SCIATICA, UNSPECIFIED BACK PAIN LATERALITY: ICD-10-CM

## 2018-03-30 DIAGNOSIS — I73.9 PERIPHERAL VASCULAR DISEASE (HCC): ICD-10-CM

## 2018-03-30 DIAGNOSIS — Z23 ENCOUNTER FOR IMMUNIZATION: ICD-10-CM

## 2018-03-30 DIAGNOSIS — E66.3 OVERWEIGHT (BMI 25.0-29.9): ICD-10-CM

## 2018-03-30 DIAGNOSIS — I10 PRIMARY HYPERTENSION: ICD-10-CM

## 2018-03-30 DIAGNOSIS — N52.9 ERECTILE DYSFUNCTION, UNSPECIFIED ERECTILE DYSFUNCTION TYPE: ICD-10-CM

## 2018-03-30 DIAGNOSIS — R80.9 CONTROLLED TYPE 2 DIABETES MELLITUS WITH MICROALBUMINURIA, WITHOUT LONG-TERM CURRENT USE OF INSULIN (HCC): Primary | ICD-10-CM

## 2018-03-30 DIAGNOSIS — E11.29 CONTROLLED TYPE 2 DIABETES MELLITUS WITH MICROALBUMINURIA, WITHOUT LONG-TERM CURRENT USE OF INSULIN (HCC): Primary | ICD-10-CM

## 2018-03-30 DIAGNOSIS — K21.9 GERD WITHOUT ESOPHAGITIS: ICD-10-CM

## 2018-03-30 DIAGNOSIS — I70.1 LEFT RENAL ARTERY STENOSIS (HCC): ICD-10-CM

## 2018-03-30 DIAGNOSIS — N18.31 CHRONIC KIDNEY DISEASE (CKD) STAGE G3A/A3, MODERATELY DECREASED GLOMERULAR FILTRATION RATE (GFR) BETWEEN 45-59 ML/MIN/1.73 SQUARE METER AND ALBUMINURIA CREATININE RATIO GREATER THAN 300 MG/G (HCC): ICD-10-CM

## 2018-03-30 RX ORDER — ACETAMINOPHEN 325 MG/1
TABLET ORAL
COMMUNITY

## 2018-03-30 NOTE — MR AVS SNAPSHOT
[address] [facility] [address] 
 
 
 [address], [address] [facility] 
[phone] Patient: Chicho Cerda MRN: [id number] XIR:[birth year] Visit Information Date & Time Provider Department Dept. Phone Encounter #  
 3/30/2018  8:40 AM Rocael Hurtado MD [facility] [phone] Your Appointments 9/26/2018  7:55 AM  
LAB with [facility] NURSE VISIT [facility] Appt Note: lab  
 [address], [address] [place] [address] [address]  
  
   
 [address], [address]  
  
    
 10/3/2018  8:20 AM  
PHYSICAL with Rocael Hurtado MD  
[facility] Appt Note:  6 months [address], [facility] [address]  
  
   
 [address] [facility], [address]  
  
    
 11/1/2018  8:00 AM  
PROCEDURE with [facility] IMAGING 2 [facility] ([facility]) Appt Note: RENAL [address], [facility] [address] [facility]  
[phone] [address] [place] [address]  
  
    
 11/1/2018  9:00 AM  
PROCEDURE with [facility] NONIMAGING [facility] ([facility]) Appt Note: LEG ART  [address] [facility], [facility] [address] [facility]  
[phone] [address]  
  
    
 11/6/2018  9:30 AM  
Follow Up with MICK Rivera [facility] ([facility]) Appt Note: follow up after studies [address] [facility] [address] 706 [facility]  
[phone] 23[address] [facility] 706 [facility] Upcoming Health Maintenance Date Due Pneumococcal 65+ Low/Medium Risk (2 of 2 - PPSV23) 7/13/2017 FOOT EXAM Q1 1/23/2018 EYE EXAM RETINAL OR DILATED Q1 3/17/2018 MEDICARE YEARLY EXAM 7/21/2018 HEMOGLOBIN A1C Q6M 9/22/2018 LIPID PANEL Q1 11/17/2018 GLAUCOMA SCREENING Q2Y 3/17/2019 COLONOSCOPY 6/18/2019 DTaP/Tdap/Td series (2 - Td) 2/17/2024 Allergies as of 3/30/2018  Review Complete On: 3/30/2018 By: Vinnie Resendez No Known Allergies Current Immunizations  Reviewed on 11/30/2017 Name Date Influenza High Dose Vaccine PF 11/30/2017  8:19 AM, 1/17/2017 Influenza Vaccine 10/1/2012 Influenza Vaccine Split 10/14/2011 Pneumococcal Conjugate (PCV-13) 7/13/2016 Tdap 2/17/2014 ZZZ-RETIRED (DO NOT USE) Pneumococcal Vaccine (Unspecified Type) 8/17/2010 Zoster 8/18/2010 Not reviewed this visit Vitals BP Pulse Temp Resp Height(growth percentile) Weight(growth percentile) 120/68 (BP 1 Location: Right arm, BP Patient Position: Sitting) 71 97.9 °F (36.6 °C) (Oral) 14 5' 9\" (1.753 m) 168 lb (76.2 kg) SpO2 BMI Smoking Status 98% 24.81 kg/m2 Former Smoker Vitals History BMI and BSA Data Body Mass Index Body Surface Area  
 24.81 kg/m 2 1.93 m 2 Preferred Pharmacy Pharmacy Name Phone [address] [place] [address], [address] [place] [address] [phone] Your Updated Medication List  
  
   
This list is accurate as of 3/30/18  9:25 AM.  Always use your most recent med list. amLODIPine 5 mg tablet Commonly known as:  Casillas Lisette Take 1 Tab by mouth daily. aspirin 81 mg chewable tablet Take 81 mg by mouth daily. atorvastatin 10 mg tablet Commonly known as:  LIPITOR Take 1 Tab by mouth daily. benazepril 40 mg tablet Commonly known as:  LOTENSIN  
TAKE 1 TABLET BY MOUTH ONCE DAILY  
  
 carvedilol 6.25 mg tablet Commonly known as:  Ruthell Fellers Take 1 Tab by mouth two (2) times daily (with meals). clopidogrel 75 mg Tab Commonly known as:  PLAVIX TAKE 1 TABLET BY MOUTH ONCE DAILY  
  
 cyclobenzaprine 10 mg tablet Commonly known as:  FLEXERIL Take 1 Tab by mouth three (3) times daily as needed for Muscle Spasm(s). diazePAM 5 mg tablet Commonly known as:  VALIUM Take 1 Tab by mouth two (2) times daily as needed for Anxiety. Max Daily Amount: 10 mg.  
  
 gabapentin 600 mg tablet Commonly known as:  NEURONTIN  
TAKE 1 TABLET BY MOUTH 3 TIMES A DAY HYDROcodone-acetaminophen 5-325 mg per tablet Commonly known as:  Anel Pont Take 1 Tab by mouth every six (6) hours as needed for Pain.  
  
 lansoprazole 30 mg capsule Commonly known as:  PREVACID Take 1 Cap by mouth Daily (before breakfast). metFORMIN 1,000 mg tablet Commonly known as:  GLUCOPHAGE  
TAKE 1 TABLET BY MOUTH 2 TIMES A DAY WITH MEALS  
  
 traMADol 50 mg tablet Commonly known as:  ULTRAM  
Take 50 mg by mouth every six (6) hours as needed for Pain.  
  
 triamcinolone 0.5 % topical cream  
Commonly known as:  ARISTOCORT Apply  to affected area two (2) times a day. use thin layer TYLENOL 325 mg tablet Generic drug:  acetaminophen Take  by mouth every four (4) hours as needed for Pain. Introducing [facility]! [address] [facility] introduces [employer] patient portal. Now you can access parts of your medical record, email your doctor's office, and request medication refills online. 1. In your internet browser, go to [email or web address] 2. Click on the First Time User? Click Here link in the Sign In box. You will see the New Member Sign Up page. 3. Enter your [employer] Access Code exactly as it appears below. You will not need to use this code after youve completed the sign-up process. If you do not sign up before the expiration date, you must request a new code. · [employer] Access Code: [email or web address] Expires: 6/28/2018  8:31 AM 
 
4.  Enter the last four digits of your Social Security Number (xxxx) and Date of Birth (mm/dd/yyyy) as indicated and click Submit. You will be taken to the next sign-up page. 5. Create a [employer] ID. This will be your [employer] login ID and cannot be changed, so think of one that is secure and easy to remember. 6. Create a [employer] password. You can change your password at any time. 7. Enter your Password Reset Question and Answer. This can be used at a later time if you forget your password. 8. Enter your e-mail address. You will receive e-mail notification when new information is available in [address] 9. Click Sign Up. You can now view and download portions of your medical record. 10. Click the Download Summary menu link to download a portable copy of your medical information. If you have questions, please visit the Frequently Asked Questions section of the [employer] website. Remember, [employer] is NOT to be used for urgent needs. For medical emergencies, dial 911. Now available from your iPhone and Android! Please provide this summary of care documentation to your next provider. Your primary care clinician is listed as Marjan Greenfield. If you have any questions after today's visit, please call [phone].

## 2018-03-30 NOTE — PROGRESS NOTES
1. Have you been to the ER, urgent care clinic or hospitalized since your last visit? NO.     2. Have you seen or consulted any other health care providers outside of the [address] [place] [address] since your last visit (Include any pap smears or colon screening)?  NO

## 2018-03-30 NOTE — PROGRESS NOTES
VO from Dr. Ean Galeana for PPSV 23            Given in Left Deltoid    Pt denies any acute infections at this time or fever. Pt aware of reactions and symptoms. No reactions noted at this time. Pt given VIS.

## 2018-03-31 PROBLEM — R55 SYNCOPE AND COLLAPSE: Status: RESOLVED | Noted: 2018-02-14 | Resolved: 2018-03-31

## 2018-04-20 DIAGNOSIS — G89.29 CHRONIC LOW BACK PAIN WITHOUT SCIATICA, UNSPECIFIED BACK PAIN LATERALITY: Primary | ICD-10-CM

## 2018-04-20 DIAGNOSIS — M54.50 CHRONIC LOW BACK PAIN WITHOUT SCIATICA, UNSPECIFIED BACK PAIN LATERALITY: Primary | ICD-10-CM

## 2018-04-20 DIAGNOSIS — M19.90 OSTEOARTHRITIS, UNSPECIFIED OSTEOARTHRITIS TYPE, UNSPECIFIED SITE: ICD-10-CM

## 2018-04-20 RX ORDER — NALOXONE HYDROCHLORIDE 4 MG/.1ML
SPRAY NASAL
Qty: 1 EACH | Refills: 1 | Status: SHIPPED | OUTPATIENT
Start: 2018-04-20

## 2018-04-20 RX ORDER — DIAZEPAM 5 MG/1
TABLET ORAL
Qty: 60 TAB | Refills: 0 | Status: SHIPPED | OUTPATIENT
Start: 2018-04-20 | End: 2018-06-22 | Stop reason: SDUPTHER

## 2018-04-20 NOTE — TELEPHONE ENCOUNTER
Called and spoke to Ceci Huddlestons to advise state regulations require Narcan with any controlled substance medication refill. Patient okay and aware.

## 2018-04-23 RX ORDER — CLOPIDOGREL BISULFATE 75 MG/1
75 TABLET ORAL DAILY
Qty: 90 TAB | Refills: 3 | Status: SHIPPED | OUTPATIENT
Start: 2018-04-23 | End: 2019-03-31 | Stop reason: SDUPTHER

## 2018-04-23 NOTE — TELEPHONE ENCOUNTER
Last Visit: 03/30/2018 with MD Maco Francis    Next Appointment: 10/30/2018 with MD Maco Francis   Previous Refill Encounters: 04/12/2017 per MD Maco Francis #30 with 12 refills     Requested Prescriptions     Pending Prescriptions Disp Refills    clopidogrel (PLAVIX) 75 mg tab 90 Tab 3     Sig: Take 1 Tab by mouth daily.

## 2018-06-08 DIAGNOSIS — I10 PRIMARY HYPERTENSION: ICD-10-CM

## 2018-06-08 RX ORDER — CARVEDILOL 6.25 MG/1
6.25 TABLET ORAL 2 TIMES DAILY WITH MEALS
Qty: 60 TAB | Refills: 5 | Status: SHIPPED | OUTPATIENT
Start: 2018-06-08 | End: 2018-12-04 | Stop reason: SDUPTHER

## 2018-06-11 DIAGNOSIS — I10 PRIMARY HYPERTENSION: ICD-10-CM

## 2018-06-11 DIAGNOSIS — E78.5 HYPERLIPIDEMIA, UNSPECIFIED HYPERLIPIDEMIA TYPE: ICD-10-CM

## 2018-06-11 DIAGNOSIS — R80.9 MICROALBUMINURIA: ICD-10-CM

## 2018-06-11 DIAGNOSIS — E78.00 PURE HYPERCHOLESTEROLEMIA: ICD-10-CM

## 2018-06-11 RX ORDER — ATORVASTATIN CALCIUM 10 MG/1
10 TABLET, FILM COATED ORAL DAILY
Qty: 90 TAB | Refills: 3 | Status: SHIPPED | OUTPATIENT
Start: 2018-06-11 | End: 2018-10-03 | Stop reason: DRUGHIGH

## 2018-06-11 RX ORDER — CARVEDILOL 6.25 MG/1
6.25 TABLET ORAL 2 TIMES DAILY WITH MEALS
Qty: 60 TAB | Refills: 5 | Status: CANCELLED | OUTPATIENT
Start: 2018-06-11

## 2018-06-11 RX ORDER — METFORMIN HYDROCHLORIDE 1000 MG/1
1000 TABLET ORAL 2 TIMES DAILY WITH MEALS
Qty: 180 TAB | Refills: 3 | Status: SHIPPED | OUTPATIENT
Start: 2018-06-11 | End: 2019-06-28 | Stop reason: SDUPTHER

## 2018-06-11 RX ORDER — METFORMIN HYDROCHLORIDE 1000 MG/1
TABLET ORAL
Qty: 180 TAB | Refills: 3 | Status: CANCELLED | OUTPATIENT
Start: 2018-06-11

## 2018-06-11 RX ORDER — ATORVASTATIN CALCIUM 10 MG/1
10 TABLET, FILM COATED ORAL DAILY
Qty: 90 TAB | Refills: 3 | Status: CANCELLED | OUTPATIENT
Start: 2018-06-11

## 2018-06-11 NOTE — TELEPHONE ENCOUNTER
Last Visit: 03/30/2018 with MD Amanda Do    Next Appointment: 10/03/2018 with MD Amanda Do   Previous Refill Encounters: 06/09/2017 per MD Valadez Hook #90 with 3 refills; 05/30/2017 per MD Amanda Do Metformin #180 with 3 refills     Requested Prescriptions     Pending Prescriptions Disp Refills    atorvastatin (LIPITOR) 10 mg tablet 90 Tab 3     Sig: Take 1 Tab by mouth daily.  metFORMIN (GLUCOPHAGE) 1,000 mg tablet 180 Tab 3     Sig: Take 1 Tab by mouth two (2) times daily (with meals).

## 2018-06-22 DIAGNOSIS — M19.90 OSTEOARTHRITIS, UNSPECIFIED OSTEOARTHRITIS TYPE, UNSPECIFIED SITE: ICD-10-CM

## 2018-06-22 RX ORDER — DIAZEPAM 5 MG/1
5 TABLET ORAL 2 TIMES DAILY
Qty: 60 TAB | Refills: 0 | OUTPATIENT
Start: 2018-06-22 | End: 2018-08-02 | Stop reason: SDUPTHER

## 2018-06-22 RX ORDER — DIAZEPAM 5 MG/1
TABLET ORAL
Qty: 60 TAB | Refills: 0 | Status: CANCELLED | OUTPATIENT
Start: 2018-06-22

## 2018-06-22 NOTE — TELEPHONE ENCOUNTER
PHONE IN [facility] reports the last fill date for Valium as 04/20/2018. There appears to be no inconsistencies in regards to the prescribing of this medication. Last Visit: 03/30/2018 with MD Krystyna Del Castillo    Next Appointment: 10/03/2018 with MD Krystyna Del Castillo   Previous Refill Encounters: 04/20/2018 per MD Krystyna Del Castillo #60     Requested Prescriptions     Pending Prescriptions Disp Refills    diazePAM (VALIUM) 5 mg tablet 60 Tab 0     Sig: Take 1 Tab by mouth two (2) times a day. Max Daily Amount: 10 mg.

## 2018-08-02 DIAGNOSIS — M19.90 OSTEOARTHRITIS, UNSPECIFIED OSTEOARTHRITIS TYPE, UNSPECIFIED SITE: ICD-10-CM

## 2018-08-02 RX ORDER — DIAZEPAM 5 MG/1
5 TABLET ORAL 2 TIMES DAILY
Qty: 60 TAB | Refills: 0 | OUTPATIENT
Start: 2018-08-02 | End: 2018-10-16 | Stop reason: SDUPTHER

## 2018-08-02 NOTE — TELEPHONE ENCOUNTER
PHONE IN [facility] reports the last fill date for Valium as 06/26/2018. There appears to be no inconsistencies in regards to the prescribing of this medication. Last Visit: 03/30/2018 with MD Dave Warren    Next Appointment: 10/03/2018 with MD Dave Warren   Previous Refill Encounters: 06/22/2018 per MD Dave Warren #60    Requested Prescriptions     Pending Prescriptions Disp Refills    diazePAM (VALIUM) 5 mg tablet 60 Tab 0     Sig: Take 1 Tab by mouth two (2) times a day. Max Daily Amount: 10 mg.

## 2018-08-03 DIAGNOSIS — M19.90 OSTEOARTHRITIS, UNSPECIFIED OSTEOARTHRITIS TYPE, UNSPECIFIED SITE: ICD-10-CM

## 2018-08-03 RX ORDER — BENAZEPRIL HYDROCHLORIDE 40 MG/1
40 TABLET ORAL DAILY
Qty: 90 TAB | Refills: 3 | Status: SHIPPED | OUTPATIENT
Start: 2018-08-03 | End: 2019-02-04

## 2018-08-03 RX ORDER — DIAZEPAM 5 MG/1
5 TABLET ORAL 2 TIMES DAILY
Qty: 60 TAB | Refills: 0 | Status: CANCELLED | OUTPATIENT
Start: 2018-08-03

## 2018-08-03 NOTE — TELEPHONE ENCOUNTER
Last Visit: 03/30/2018 with MD Marlen Wellington    Next Appointment: 10/03/2018 with MD Marlen Wellington   Previous Refill Encounters: 07/23/2017 per MD Marlen Wellington #90 with 3 refills     Requested Prescriptions     Pending Prescriptions Disp Refills    benazepril (LOTENSIN) 40 mg tablet 90 Tab 3     Sig: Take 1 Tab by mouth daily.

## 2018-09-26 ENCOUNTER — HOSPITAL ENCOUNTER (OUTPATIENT)
Dept: LAB | Age: 68
Discharge: HOME | End: 2018-09-26

## 2018-09-26 ENCOUNTER — LAB ONLY (OUTPATIENT)
Dept: INTERNAL MEDICINE CLINIC | Age: 68
End: 2018-09-26

## 2018-09-26 DIAGNOSIS — R80.9 TYPE 2 DIABETES MELLITUS WITH MICROALBUMINURIA, UNSPECIFIED WHETHER LONG TERM INSULIN USE (HCC): Primary | ICD-10-CM

## 2018-09-26 DIAGNOSIS — E11.29 TYPE 2 DIABETES MELLITUS WITH MICROALBUMINURIA, UNSPECIFIED WHETHER LONG TERM INSULIN USE (HCC): Primary | ICD-10-CM

## 2018-09-26 PROCEDURE — 99001 SPECIMEN HANDLING PT-LAB: CPT | Performed by: INTERNAL MEDICINE

## 2018-09-27 LAB
BUN SERPL-MCNC: 13 MG/DL (ref 8–27)
BUN/CREAT SERPL: 9 (ref 10–24)
CALCIUM SERPL-MCNC: 9.2 MG/DL (ref 8.6–10.2)
CHLORIDE SERPL-SCNC: 102 MMOL/L (ref 96–106)
CHOLEST SERPL-MCNC: 149 MG/DL (ref 100–199)
CO2 SERPL-SCNC: 22 MMOL/L (ref 20–29)
CREAT SERPL-MCNC: 1.51 MG/DL (ref 0.76–1.27)
EST. AVERAGE GLUCOSE BLD GHB EST-MCNC: 117 MG/DL
GLUCOSE SERPL-MCNC: 86 MG/DL (ref 65–99)
HBA1C MFR BLD: 5.7 % (ref 4.8–5.6)
HDLC SERPL-MCNC: 33 MG/DL
INTERPRETATION, 910389: NORMAL
INTERPRETATION: NORMAL
LDLC SERPL CALC-MCNC: 86 MG/DL (ref 0–99)
Lab: NORMAL
PDF IMAGE, 910387: NORMAL
POTASSIUM SERPL-SCNC: 5.6 MMOL/L (ref 3.5–5.2)
SODIUM SERPL-SCNC: 140 MMOL/L (ref 134–144)
TRIGL SERPL-MCNC: 150 MG/DL (ref 0–149)
VLDLC SERPL CALC-MCNC: 30 MG/DL (ref 5–40)

## 2018-09-30 NOTE — ACP (ADVANCE CARE PLANNING)
Advance Care Planning Advance Care Planning (ACP) Provider Note - Comprehensive Date of ACP Conversation: 10/03/18 Persons included in Conversation:  patient and family Length of ACP Conversation in minutes:  16 minutes Authorized Decision Maker (if patient is incapable of making informed decisions): This person is: wife Healthcare Agent/Medical Power of  under Advance Directive General ACP for ALL Patients with Decision Making Capacity: 
 Importance of advance care planning, including choosing a healthcare agent to communicate patient's healthcare decisions if patient lost the ability to make decisions, such as after a sudden illness or accident Understanding of the healthcare agent role was assessed and information provided Exploration of values, goals, and preferences if recovery is not expected, even with continued medical treatment in the event of: Imminent death Severe, permanent brain injury Review of Existing Advance Directive: 
Does this advance directive still reflect your preferences? Yes (Provide new form/Refer for assistance in updating) For Serious or Chronic Illness: 
Understanding of medical condition Understanding of CPR, goals and expected outcomes, benefits and burdens discussed. Interventions Provided: 
Recommended communicating the plan and making copies for the healthcare agent, personal physician, and others as appropriate (e.g., health system) Recommended review of completed ACP document annually or upon change in health status

## 2018-09-30 NOTE — PROGRESS NOTES
76 y.o. white male who presents for f/u Continues to try to be active with chores, no exercise. Denies any cardiovascular complaints. No polyuria, polydipsia, nocturia, vision change or neurologic complaints. Not checking sugars regularly. Weight is stable. Sees Dr. Renu Kate 10/3/2018 3/30/2018 2/22/2018 Weight 176 lb 168 lb 178 lb Denies any claudication sx when he does walk. Sometimes he does an hour on the lawn no problem No gi or gu issues LAST MEDICARE WELLNESS EXAM: 7/13/16, 7/20/17, 10/3/18 Past Medical History:  
Diagnosis Date  Anemia   
 fe def w gi w/u Dr Ana Juarez  Basal cell carcinoma   
 s/p resection Dr. Court Morales  Chronic back pain inoperable Dr. Abdi Clarke and Dr. Indio Guerrero  Colon adenoma   
 and diverticulosis Dr Ana Juarez 2014  Diabetes mellitus (HCC)   
 microalbuminuria  DJD (degenerative joint disease)  Dyshidrotic eczema  Dyslipidemia  ED (erectile dysfunction)  FHx: heart disease  Gastritis 2014 Dr Ana Juarez neg h pylori  GERD (gastroesophageal reflux disease) 2014  
 on EGD Dr Ana Juarez  Hypertension  Left renal artery stenosis ([address] 11/30/2017  Meniere's disease   
 s/p surgery Dr Libra Jimenez  Overweight (BMI 25.0-29.9) 11/30/2017  PAD (peripheral artery disease) ([facility] [address] Past Surgical History:  
Procedure Laterality Date  CARDIAC SURG PROCEDURE UNLIST  10/10 NST neg EF 68%  CARDIAC SURG PROCEDURE UNLIST  02/2018  
 echo nl LV, ef 62%, mild mr/tr/pr, rvp 33 [facility]  
 HX CHOLECYSTECTOMY  2011 Dr. Tomas Landaverde HX COLONOSCOPY  2014  
 adenoma and divertics Dr Ana Juarez  HX GI  2014  
 pill camera non bleeding ileal AVM  HX TONSILLECTOMY  SINUS SURGERY PROC UNLISTED    
 left ear surgery Dr Libra Jimenez for meniere's  VASCULAR SURGERY PROCEDURE UNLIST  10/10 Illiac Bypass left Graft Surgery  Dr. Maribel Rudd Social History Social History  Marital status:    Spouse name: N/A  
  Number of children: 0  
 Years of education: N/A Occupational History  ret [employer] [employer] Social History Main Topics  Smoking status: Former Smoker Packs/day: 1.00 Quit date: 10/22/2011  Smokeless tobacco: Never Used  Alcohol use No  
 Drug use: No  
 Sexual activity: Not on file Other Topics Concern  Not on file Social History Narrative Current Outpatient Prescriptions Medication Sig  
 gabapentin (NEURONTIN) 600 mg tablet TAKE 1 TABLET BY MOUTH 3 TIMES A DAY  benazepril (LOTENSIN) 40 mg tablet Take 1 Tab by mouth daily.  diazePAM (VALIUM) 5 mg tablet Take 1 Tab by mouth two (2) times a day. Max Daily Amount: 10 mg.  
 atorvastatin (LIPITOR) 10 mg tablet Take 1 Tab by mouth daily.  metFORMIN (GLUCOPHAGE) 1,000 mg tablet Take 1 Tab by mouth two (2) times daily (with meals).  carvedilol (COREG) 6.25 mg tablet Take 1 Tab by mouth two (2) times daily (with meals).  clopidogrel (PLAVIX) 75 mg tab Take 1 Tab by mouth daily.  acetaminophen (TYLENOL) 325 mg tablet Take  by mouth every four (4) hours as needed for Pain.  aspirin 81 mg chewable tablet Take 81 mg by mouth daily.  traMADol (ULTRAM) 50 mg tablet Take 50 mg by mouth every six (6) hours as needed for Pain.  lansoprazole (PREVACID) 30 mg capsule Take 1 Cap by mouth Daily (before breakfast).  amLODIPine (NORVASC) 5 mg tablet Take 1 Tab by mouth daily.  triamcinolone (ARISTOCORT) 0.5 % topical cream Apply  to affected area two (2) times a day. use thin layer  naloxone (NARCAN) 4 mg/actuation nasal spray Use 1 spray intranasally into 1 nostril. Use a new Narcan nasal spray for subsequent doses and administer into alternating nostrils. May repeat every 2 to 3 minutes as needed.  cyclobenzaprine (FLEXERIL) 10 mg tablet Take 1 Tab by mouth three (3) times daily as needed for Muscle Spasm(s). No current facility-administered medications for this visit. No Known Allergies REVIEW OF SYSTEMS: sees Dr. Yasmeen Moore, no podiatry, colo 2014 Dr Dominic Collins Ophtho  no vision change or eye pain Oral  no mouth pain, tongue or tooth problems Ears  no hearing loss, ear pain, fullness, no swallowing problems Cardiac  no CP, PND, orthopnea, edema, palpitations or syncope Chest  no breast masses Resp  no wheezing, chronic coughing, dyspnea GI  no heartburn, nausea, vomiting, change in bowel habits, bleeding, hemorrhoids Urinary  no dysuria, hematuria, flank pain, urgency, frequency Endo - no polyuria, polydipsia, nocturia, hot flashes Visit Vitals  /68  Pulse 71  Temp 98.2 °F (36.8 °C) (Oral)  Resp 14  
 Ht 5' 9\" (1.753 m)  Wt 176 lb (79.8 kg)  SpO2 96%  BMI 25.99 kg/m2 A&O x3 Affect is appropriate. Mood stable No apparent distress Anicteric, no JVD, adenopathy or thyromegaly. No carotid bruits or radiated murmur Lungs clear to auscultation, no wheezes or rales Heart showed regular rate and rhythm. No murmur, rubs, gallops Abdomen soft nontender, no hepatosplenomegaly or masses. Ext without c/c/e, 1+m pulses dp/pt LABS From 10/11 showed gluc 99,  cr 0.98, gfr 83, alt 43,  hba1c 5.6, ldl-p 1018, chol 124, tg 223, hdl 37, ldl-c 42, umar 11.2, tsh 2.10,          psa 0.90 From 4/12 showed                  hba1c 5.8, ldl-p 1136, chol 143, tg 188, hdl 38, ldl-c 67, umar 9.5 From 10/12 showed gluc 87,  cr 0.99, gfr 81, alt 15,  hba1c 5.6, ldl-p 500,   chol 109, tg 125, hdl 40, ldl-c 44 From 7/13 showed                  hba1c 5.7,                   chol 136, tg 137, hdl 42, ldl-c 67 From 2/14 showed                  hba1c 6.0,               chol 136, tg 72,   hdl 52, ldl-c 72, umar 347,  wbc 12.4, hb 12.0, plt 240, psa 1.39 From 5/14 showed                  hba1c 6.0,               chol 135, tg 94,   hdl 45, ldl-c 71, umar 469,  wbc 9.5,   hb 11.6, plt 232, psa 1.35 
 From 5/14 showed                                      fe 25, %sat 7, ferritin 32, b12 699, fol 16.1, spep neg From 6/14 showed   gluc 100, cr 1.33, gfr 54 From 8/14 showed                  hba1c 6.1,               chol 137, tg 93,   hdl 46, ldl-c 98, umar 100 From 2/15 showed   gluc 100, cr 1.50, gfr 46, alt 14, hba1c 5.9,             umar 82.1,  wbc 9.9,   hb 11.2, plt 203 From 9/15 showed   gluc 88,   cr 1.30,    alt 11, hba1c 5.6,    chol 135, tg 123, hdl 43, ldl-c 67, umar 159,   wbc 9.2,   hb 11.8, plt 225 From 3/16 showed   gluc 77,   cr 1.58, gfr 45,   hba1c 6.0,                    hep c neg, na 133 From 7/16 showed   gluc 96,   cr 1.41, gfr 52,   hba1c 5.9,             umar 163,   wbc 9.6,  hb 12.1, plt 281,                    na 131 From 1/17 showed   gluc 79,   cr 1.50, gfr 48 From 7/17 showed   gluc 92,   cr 1.57, gfr 45, alt 6,   hba1c 5.8,   chol 170, tg 135, hdl 44, ldl-c 99, umar 1043 From 11/17 showed      hba1c 5.7,   chol 156, tg 125, hdl 51, ldl-c 80, umar 779,   wbc 10.1, hb 12.0, plt 264 From 2/18 showed   gluc 83,   cr 1.40, gfr 54,                       wbc 6.2,   hb 9.8,   plt 216, fe 66 ferritin 58.6, b12 376 From 3/18 showed   gluc 82,   cr 1.69, gfr 41,   hba1c 5.7,            umar 275 Results for orders placed or performed in visit on 09/26/18 LIPID PANEL Result Value Ref Range Cholesterol, total 149 100 - 199 mg/dL Triglyceride 150 (H) 0 - 149 mg/dL HDL Cholesterol 33 (L) >39 mg/dL VLDL, calculated 30 5 - 40 mg/dL LDL, calculated 86 0 - 99 mg/dL METABOLIC PANEL, BASIC Result Value Ref Range Glucose 86 65 - 99 mg/dL BUN 13 8 - 27 mg/dL Creatinine 1.51 (H) 0.76 - 1.27 mg/dL GFR est non-AA 47 (L) >59 mL/min/1.73 GFR est AA 54 (L) >59 mL/min/1.73  
 BUN/Creatinine ratio 9 (L) 10 - 24 Sodium 140 134 - 144 mmol/L Potassium 5.6 (H) 3.5 - 5.2 mmol/L Chloride 102 96 - 106 mmol/L  
 CO2 22 20 - 29 mmol/L Calcium 9.2 8.6 - 10.2 mg/dL HEMOGLOBIN A1C WITH EAG Result Value Ref Range Hemoglobin A1c 5.7 (H) 4.8 - 5.6 % Estimated average glucose 117 mg/dL CVD REPORT Result Value Ref Range INTERPRETATION Note PDF IMAGE Not applicable CKD REPORT Result Value Ref Range Interpretation Note DIABETES PATIENT EDUCATION Result Value Ref Range PDF Image Not applicable Patient Active Problem List  
Diagnosis Code  Hyperlipidemia E78.5  Chronic back pain inoperable Dr. Whit Bills and Dr. Sharmila Saucedo M54.9, I65.23  
 Peripheral vascular disease s/p left iliac bpg Dr. Hortencia Bhatt 10/10 I73.9  Erectile dysfunction N52.9  Arthritis, degenerative M19.90  
 Primary hypertension I10  
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 9) E66.3 Assessment and plan: 1. Ménière's. Followup Dr. Hal Veras prn 2. Diabetes w microalbuminuria. Well-controlled on metformin alone, close watch on renal fxn 3. Hyperlipidemia. Inc lipitor to 40 per guidelines 4. Chronic back problems. Continue current 5. Vascular. F/U Dr. Hortencia Bhatt as directed 6. CKD. F/U Dr David Conde 7. HTN. Continue current regimen. 8. Overweight. Lifestyle and dietary measures, portion control RTC 4/18 Above conditions discussed at length and patient vocalized understanding. All questions answered to patient satisfaction

## 2018-09-30 NOTE — PATIENT INSTRUCTIONS
Medicare Wellness Visit, Male The best way to live healthy is to have a lifestyle where you eat a well-balanced diet, exercise regularly, limit alcohol use, and quit all forms of tobacco/nicotine, if applicable. Regular preventive services are another way to keep healthy. Preventive services (vaccines, screening tests, monitoring & exams) can help personalize your care plan, which helps you manage your own care. Screening tests can find health problems at the earliest stages, when they are easiest to treat. [facility] follows the current, evidence-based guidelines published by the [facility] Cristian Kevin ([facility]) when recommending preventive services for our patients. Because we follow these guidelines, sometimes recommendations change over time as research supports it. (For example, a prostate screening blood test is no longer routinely recommended for men with no symptoms.) Of course, you and your doctor may decide to screen more often for some diseases, based on your risk and co-morbidities (chronic disease you are already diagnosed with). Preventive services for you include: - Medicare offers their members a free annual wellness visit, which is time for you and your primary care provider to discuss and plan for your preventive service needs. Take advantage of this benefit every year! 
-All adults over age 72 should receive the recommended pneumonia vaccines. Current USPSTF guidelines recommend a series of two vaccines for the best pneumonia protection.  
-All adults should have a flu vaccine yearly and an ECG.  All adults age 61 and older should receive a shingles vaccine once in their lifetime.   
-All adults age 38-68 who are overweight should have a diabetes screening test once every three years.  
-Other screening tests & preventive services for persons with diabetes include: an eye exam to screen for diabetic retinopathy, a kidney function test, a foot exam, and stricter control over your cholesterol.  
-Cardiovascular screening for adults with routine risk involves an electrocardiogram (ECG) at intervals determined by the provider.  
-Colorectal cancer screening should be done for adults age 54-65 with no increased risk factors for colorectal cancer. There are a number of acceptable methods of screening for this type of cancer. Each test has its own benefits and drawbacks. Discuss with your provider what is most appropriate for you during your annual wellness visit. The different tests include: colonoscopy (considered the best screening method), a fecal occult blood test, a fecal DNA test, and sigmoidoscopy. 
-All adults born between [facility] should be screened once for Hepatitis C. 
-An Abdominal Aortic Aneurysm (AAA) Screening is recommended for men age 73-68 who has ever smoked in their lifetime. Here is a list of your current Health Maintenance items (your personalized list of preventive services) with a due date: 
Health Maintenance Due Topic Date Due  Shingles Vaccine (1 of 2) 07[birth year]11/2000 [address] [place] [address] Annual Well Visit  07/21/2018  Flu Vaccine  08/01/2018 Vaccine Information Statement Influenza (Flu) Vaccine (Inactivated or Recombinant): What you need to know Many Vaccine Information Statements are available in [place]ian and other languages. See www.immunize.org/vis Hojas de Información Sobre Vacunas están disponibles en Español y en muchos otros idiomas. Visite www.immunize.org/vis 1. Why get vaccinated? Influenza (flu) is a contagious disease that spreads around the [place] every year, usually between October and May. Flu is caused by influenza viruses, and is spread mainly by coughing, sneezing, and close contact. Anyone can get flu. Flu strikes suddenly and can last several days. Symptoms vary by age, but can include: 
 fever/chills  sore throat  muscle aches  fatigue  cough  headache  runny or stuffy nose Flu can also lead to pneumonia and blood infections, and cause diarrhea and seizures in children. If you have a medical condition, such as heart or lung disease, flu can make it worse. Flu is more dangerous for some people. Infants and young children, people 72years of age and older, pregnant women, and people with certain health conditions or a weakened immune system are at greatest risk. Each year thousands of people in the [facility] die from flu, and many more are hospitalized. Flu vaccine can: 
 keep you from getting flu, 
 make flu less severe if you do get it, and 
 keep you from spreading flu to your family and other people. 2. Inactivated and recombinant flu vaccines A dose of flu vaccine is recommended every flu season. Children 6 months through 6years of age may need two doses during the same flu season. Everyone else needs only one dose each flu season. Some inactivated flu vaccines contain a very small amount of a mercury-based preservative called thimerosal. Studies have not shown thimerosal in vaccines to be harmful, but flu vaccines that do not contain thimerosal are available. There is no live flu virus in flu shots. They cannot cause the flu. There are many flu viruses, and they are always changing. Each year a new flu vaccine is made to protect against three or four viruses that are likely to cause disease in the upcoming flu season. But even when the vaccine doesnt exactly match these viruses, it may still provide some protection Flu vaccine cannot prevent: 
 flu that is caused by a virus not covered by the vaccine, or 
 illnesses that look like flu but are not. It takes about 2 weeks for protection to develop after vaccination, and protection lasts through the flu season. 3. Some people should not get this vaccine Tell the person who is giving you the vaccine:  If you have any severe, life-threatening allergies. If you ever had a life-threatening allergic reaction after a dose of flu vaccine, or have a severe allergy to any part of this vaccine, you may be advised not to get vaccinated. Most, but not all, types of flu vaccine contain a small amount of egg protein.  If you ever had Guillain-Barré Syndrome (also called GBS). Some people with a history of GBS should not get this vaccine. This should be discussed with your doctor.  If you are not feeling well. It is usually okay to get flu vaccine when you have a mild illness, but you might be asked to come back when you feel better. 4. Risks of a vaccine reaction With any medicine, including vaccines, there is a chance of reactions. These are usually mild and go away on their own, but serious reactions are also possible. Most people who get a flu shot do not have any problems with it. Minor problems following a flu shot include:  
 soreness, redness, or swelling where the shot was given  hoarseness  sore, red or itchy eyes  cough  fever  aches  headache  itching  fatigue If these problems occur, they usually begin soon after the shot and last 1 or 2 days. More serious problems following a flu shot can include the following:  There may be a small increased risk of Guillain-Barré Syndrome (GBS) after inactivated flu vaccine. This risk has been estimated at 1 or 2 additional cases per million people vaccinated. This is much lower than the risk of severe complications from flu, which can be prevented by flu vaccine.  Young children who get the flu shot along with pneumococcal vaccine (PCV13) and/or DTaP vaccine at the same time might be slightly more likely to have a seizure caused by fever. Ask your doctor for more information. Tell your doctor if a child who is getting flu vaccine has ever had a seizure. Problems that could happen after any injected vaccine:  People sometimes faint after a medical procedure, including vaccination. Sitting or lying down for about 15 minutes can help prevent fainting, and injuries caused by a fall. Tell your doctor if you feel dizzy, or have vision changes or ringing in the ears.  Some people get severe pain in the shoulder and have difficulty moving the arm where a shot was given. This happens very rarely.  Any medication can cause a severe allergic reaction. Such reactions from a vaccine are very rare, estimated at about 1 in a million doses, and would happen within a few minutes to a few hours after the vaccination. As with any medicine, there is a very remote chance of a vaccine causing a serious injury or death. The safety of vaccines is always being monitored. For more information, visit: www.cdc.gov/vaccinesafety/ 
 
 
6. The National Vaccine Injury Compensation Program 
 
The [facility] Vaccine Injury Compensation Program (VICP) is a federal program that was created to compensate people who may have been injured by certain vaccines. Persons who believe they may have been injured by a vaccine can learn about the program and about filing a claim by calling 0-776.810.3218 or visiting the [employer] website at www.[place].gov/vaccinecompensation. There is a time limit to file a claim for compensation. 7. How can I learn more?  Ask your healthcare provider. He or she can give you the vaccine package insert or suggest other sources of information.  Call your local or state health department.  Contact the Centers for Disease Control and Prevention (CDC): 
- Call 0-503.367.9983 (1-800-CDC-INFO) or 
- Visit CDCs website at www.cdc.gov/flu Vaccine Information Statement Inactivated Influenza Vaccine 8/7/2015 
[id number] Department of Health and JANZZ Centers for Disease Control and Prevention Office Use Only

## 2018-09-30 NOTE — PROGRESS NOTES
This is a subsequent Preventive Physical Examination I have reviewed the patient's medical history in detail and updated the computerized patient record. History Past Medical History:  
Diagnosis Date  Anemia   
 fe def w gi w/u Dr Genoveva Herron  Basal cell carcinoma   
 s/p resection Dr. Dar Ocampo  Chronic back pain inoperable Dr. Diaz Rincon and Dr. Fabio Baker  Colon adenoma   
 and diverticulosis Dr Genoveva Herron 2014  Diabetes mellitus (HCC)   
 microalbuminuria  DJD (degenerative joint disease)  Dyshidrotic eczema  Dyslipidemia  ED (erectile dysfunction)  FHx: heart disease  Gastritis 2014 Dr Genoveva Herron neg h pylori  GERD (gastroesophageal reflux disease) 2014  
 on EGD Dr Genoveva Herron  Hypertension  Left renal artery stenosis ([address] 11/30/2017  Meniere's disease   
 s/p surgery Dr Cyrus Parsons  Overweight (BMI 25.0-29.9) 11/30/2017  PAD (peripheral artery disease) ([facility] [address] Past Surgical History:  
Procedure Laterality Date  CARDIAC SURG PROCEDURE UNLIST  10/10 NST neg EF 68%  CARDIAC SURG PROCEDURE UNLIST  02/2018  
 echo nl LV, ef 62%, mild mr/tr/pr, rvp 33 [facility]  
 HX CHOLECYSTECTOMY  2011 Dr. Estela Mora HX COLONOSCOPY  2014  
 adenoma and divertics Dr Genoveva Herron  HX GI  2014  
 pill camera non bleeding ileal AVM  HX TONSILLECTOMY  SINUS SURGERY PROC UNLISTED    
 left ear surgery Dr Cyrus Parsons for meniere's  VASCULAR SURGERY PROCEDURE UNLIST  10/10 Illiac Bypass left Graft Surgery  Dr. Joanna Vazquez Current Outpatient Prescriptions Medication Sig Dispense Refill  benazepril (LOTENSIN) 40 mg tablet Take 1 Tab by mouth daily. 90 Tab 3  
 diazePAM (VALIUM) 5 mg tablet Take 1 Tab by mouth two (2) times a day. Max Daily Amount: 10 mg. 60 Tab 0  
 atorvastatin (LIPITOR) 10 mg tablet Take 1 Tab by mouth daily. 90 Tab 3  
 metFORMIN (GLUCOPHAGE) 1,000 mg tablet Take 1 Tab by mouth two (2) times daily (with meals).  180 Tab 3  
  carvedilol (COREG) 6.25 mg tablet Take 1 Tab by mouth two (2) times daily (with meals). 60 Tab 5  clopidogrel (PLAVIX) 75 mg tab Take 1 Tab by mouth daily. 90 Tab 3  
 naloxone (NARCAN) 4 mg/actuation nasal spray Use 1 spray intranasally into 1 nostril. Use a new Narcan nasal spray for subsequent doses and administer into alternating nostrils. May repeat every 2 to 3 minutes as needed. 1 Each 1  
 acetaminophen (TYLENOL) 325 mg tablet Take  by mouth every four (4) hours as needed for Pain.  gabapentin (NEURONTIN) 600 mg tablet TAKE 1 TABLET BY MOUTH 3 TIMES A DAY 90 Tab 5  cyclobenzaprine (FLEXERIL) 10 mg tablet Take 1 Tab by mouth three (3) times daily as needed for Muscle Spasm(s). 30 Tab 1  
 aspirin 81 mg chewable tablet Take 81 mg by mouth daily.  traMADol (ULTRAM) 50 mg tablet Take 50 mg by mouth every six (6) hours as needed for Pain.  lansoprazole (PREVACID) 30 mg capsule Take 1 Cap by mouth Daily (before breakfast). 90 Cap 3  
 amLODIPine (NORVASC) 5 mg tablet Take 1 Tab by mouth daily. 90 Tab 3  
 triamcinolone (ARISTOCORT) 0.5 % topical cream Apply  to affected area two (2) times a day. use thin layer 60 g 3 No Known Allergies Family History Problem Relation Age of Onset  Heart Disease Mother  Stroke Father Social History Substance Use Topics  Smoking status: Former Smoker Packs/day: 1.00 Quit date: 10/22/2011  Smokeless tobacco: Never Used  Alcohol use No  
 
Diet, Lifestyle: generally follows a low fat low cholesterol diet, follows a diabetic diet regularly, sedentary Exercise level: not active Depression Risk Screen PHQ over the last two weeks 3/30/2018 Little interest or pleasure in doing things Not at all Feeling down, depressed, irritable, or hopeless Not at all Total Score PHQ 2 0 Immunization History Administered Date(s) Administered  Influenza High Dose Vaccine PF 01/17/2017, 11/30/2017  Influenza Vaccine 10/01/2012  Influenza Vaccine Split 10/14/2011  Pneumococcal Conjugate (PCV-13) 07/13/2016  Pneumococcal Polysaccharide (PPSV-23) 03/30/2018  Tdap 02/17/2014  ZZZ-RETIRED (DO NOT USE) Pneumococcal Vaccine (Unspecified Type) 08/17/2010  Zoster 08/18/2010 SCREENINGS Colonoscopy last done 2014 Dr Demetrius Jalloh Prostate CHASITY done  PSA done Alcohol Risk Screen On any occasion during the past 3 months, have you had more than 4 drinks containing alcohol? No 
 
Do you average more than 14 drinks per week? No 
 
Functional Ability and Level of Safety Hearing Loss  
mild Activities of Daily Living Self-care Fall Risk Screen Fall Risk Assessment, last 12 mths 3/30/2018 Able to walk? Yes Fall in past 12 months? No  
Fall with injury? -  
Number of falls in past 12 months - Fall Risk Score -  
 
Abuse Screen Patient is not abused Review of Systems A comprehensive review of systems was negative except for that written in the HPI. Physical Examination There were no vitals taken for this visit. Patient Care Team: 
Sena Frank MD as PCP - General (Internal Medicine) Lennox Pupa, PA (Vascular Surgery) Cyn Johnson, RN as Ambulatory Care Navigator (Internal Medicine) Maribel Ochoa MD (Ophthalmology) Tere Bailey, RN as Ambulatory Care Navigator (Internal Medicine) End-of-life planning Advanced Directive discussed and documented: YES Assessment/Plan Education and counseling provided: 
Are appropriate based on today's review and evaluation End-of-Life planning (with patient's consent) Pneumococcal Vaccine Colorectal cancer screening tests Cardiovascular screening blood test 
Diabetes screening test  
 
  ICD-10-CM ICD-9-CM 1. Medicare annual wellness visit, subsequent Z00.00 V70.0 2. Advanced directives, counseling/discussion Z71.89 V65.49 ADVANCE CARE PLANNING FIRST 30 MINS 3. Screening for alcoholism Z13.89 V79.1 AR ANNUAL ALCOHOL SCREEN 15 MIN 4. Screening for [facility]13.89 [facility]79.0 [facility] 5. Screen for colon cancer Z12.11 V76.51   
6. Screening for diabetes mellitus Z13.1 V77.1 7. Screening for ischemic heart disease Z13.6 V81.0 8. Special screening for malignant neoplasm of prostate Z12.5 V76.44 AR PROSTATE CA SCREENING; CHASITY  
 
current treatment plan is effective 
lab results and schedule of future lab studies reviewed with patient 
cardiovascular risk and specific lipid/LDL goals reviewed. End of life discussion undertaken. Has medical directive in place Flu high dose when in season 
pvx-23 to be given Colonoscopy to be scheduled 2024

## 2018-10-01 RX ORDER — GABAPENTIN 600 MG/1
TABLET ORAL
Qty: 90 TAB | Refills: 5 | Status: SHIPPED | OUTPATIENT
Start: 2018-10-01 | End: 2019-04-08 | Stop reason: SDUPTHER

## 2018-10-03 ENCOUNTER — OFFICE VISIT (OUTPATIENT)
Dept: INTERNAL MEDICINE CLINIC | Age: 68
End: 2018-10-03

## 2018-10-03 VITALS
HEART RATE: 71 BPM | BODY MASS INDEX: 26.07 KG/M2 | TEMPERATURE: 98.2 F | OXYGEN SATURATION: 96 % | DIASTOLIC BLOOD PRESSURE: 68 MMHG | WEIGHT: 176 LBS | RESPIRATION RATE: 14 BRPM | SYSTOLIC BLOOD PRESSURE: 120 MMHG | HEIGHT: 69 IN

## 2018-10-03 DIAGNOSIS — I70.1 LEFT RENAL ARTERY STENOSIS (HCC): ICD-10-CM

## 2018-10-03 DIAGNOSIS — R80.9 CONTROLLED TYPE 2 DIABETES MELLITUS WITH MICROALBUMINURIA, WITHOUT LONG-TERM CURRENT USE OF INSULIN (HCC): ICD-10-CM

## 2018-10-03 DIAGNOSIS — Z71.89 ADVANCED DIRECTIVES, COUNSELING/DISCUSSION: ICD-10-CM

## 2018-10-03 DIAGNOSIS — Z13.1 SCREENING FOR DIABETES MELLITUS: ICD-10-CM

## 2018-10-03 DIAGNOSIS — E66.3 OVERWEIGHT (BMI 25.0-29.9): ICD-10-CM

## 2018-10-03 DIAGNOSIS — E78.5 HYPERLIPIDEMIA, UNSPECIFIED HYPERLIPIDEMIA TYPE: ICD-10-CM

## 2018-10-03 DIAGNOSIS — K21.9 GERD WITHOUT ESOPHAGITIS: ICD-10-CM

## 2018-10-03 DIAGNOSIS — N18.31 CHRONIC KIDNEY DISEASE (CKD) STAGE G3A/A3, MODERATELY DECREASED GLOMERULAR FILTRATION RATE (GFR) BETWEEN 45-59 ML/MIN/1.73 SQUARE METER AND ALBUMINURIA CREATININE RATIO GREATER THAN 300 MG/G (HCC): ICD-10-CM

## 2018-10-03 DIAGNOSIS — I73.9 PERIPHERAL VASCULAR DISEASE (HCC): ICD-10-CM

## 2018-10-03 DIAGNOSIS — Z13.39 SCREENING FOR ALCOHOLISM: ICD-10-CM

## 2018-10-03 DIAGNOSIS — Z23 ENCOUNTER FOR IMMUNIZATION: ICD-10-CM

## 2018-10-03 DIAGNOSIS — Z13.6 SCREENING FOR ISCHEMIC HEART DISEASE: ICD-10-CM

## 2018-10-03 DIAGNOSIS — I10 PRIMARY HYPERTENSION: ICD-10-CM

## 2018-10-03 DIAGNOSIS — Z00.00 MEDICARE ANNUAL WELLNESS VISIT, SUBSEQUENT: Primary | ICD-10-CM

## 2018-10-03 DIAGNOSIS — G89.29 CHRONIC LOW BACK PAIN WITHOUT SCIATICA, UNSPECIFIED BACK PAIN LATERALITY: ICD-10-CM

## 2018-10-03 DIAGNOSIS — E11.29 CONTROLLED TYPE 2 DIABETES MELLITUS WITH MICROALBUMINURIA, WITHOUT LONG-TERM CURRENT USE OF INSULIN (HCC): ICD-10-CM

## 2018-10-03 DIAGNOSIS — Z12.11 SCREEN FOR COLON CANCER: ICD-10-CM

## 2018-10-03 DIAGNOSIS — Z13.31 SCREENING FOR DEPRESSION: ICD-10-CM

## 2018-10-03 DIAGNOSIS — M54.50 CHRONIC LOW BACK PAIN WITHOUT SCIATICA, UNSPECIFIED BACK PAIN LATERALITY: ICD-10-CM

## 2018-10-03 DIAGNOSIS — Z12.5 SPECIAL SCREENING FOR MALIGNANT NEOPLASM OF PROSTATE: ICD-10-CM

## 2018-10-03 PROBLEM — E11.40 TYPE 2 DIABETES MELLITUS WITH DIABETIC NEUROPATHY (HCC): Status: ACTIVE | Noted: 2018-10-03

## 2018-10-03 RX ORDER — ATORVASTATIN CALCIUM 40 MG/1
40 TABLET, FILM COATED ORAL DAILY
Qty: 30 TAB | Refills: 11 | Status: SHIPPED | OUTPATIENT
Start: 2018-10-03 | End: 2019-10-03 | Stop reason: SDUPTHER

## 2018-10-03 NOTE — PROGRESS NOTES
This is a subsequent Preventive Physical Examination I have reviewed the patient's medical history in detail and updated the computerized patient record. History Past Medical History:  
Diagnosis Date  Anemia   
 fe def w gi w/u Dr Hakeem Tyler  Basal cell carcinoma   
 s/p resection Dr. Dalton Vitale  Chronic back pain inoperable Dr. Sushila Moore and Dr. Lachelle Posadas  Colon adenoma   
 and diverticulosis Dr Hakeem Tyler 2014  Diabetes mellitus (HCC)   
 microalbuminuria  DJD (degenerative joint disease)  Dyshidrotic eczema  Dyslipidemia  ED (erectile dysfunction)  FHx: heart disease  Gastritis 2014 Dr Hakeem Tyler neg h pylori  GERD (gastroesophageal reflux disease) 2014  
 on EGD Dr Hakeem Tyler  Hypertension  Left renal artery stenosis ([facility] [address] 11/30/2017  Meniere's disease   
 s/p surgery Dr Chase Almanza  Overweight (BMI 25.0-29.9) 11/30/2017  PAD (peripheral artery disease) ([facility] [address] Past Surgical History:  
Procedure Laterality Date  CARDIAC SURG PROCEDURE UNLIST  10/10 NST neg EF 68%  CARDIAC SURG PROCEDURE UNLIST  02/2018  
 echo nl LV, ef 62%, mild mr/tr/pr, rvp 33 [facility]  
 HX CHOLECYSTECTOMY  2011 Dr. Paola Guerrero HX COLONOSCOPY  2014  
 adenoma and divertics Dr Hakeem Tyler  HX GI  2014  
 pill camera non bleeding ileal AVM  HX TONSILLECTOMY  SINUS SURGERY PROC UNLISTED    
 left ear surgery Dr Chase Almanza for meniere's  VASCULAR SURGERY PROCEDURE UNLIST  10/10 Illiac Bypass left Graft Surgery  Dr. [facility] Current Outpatient Prescriptions Medication Sig Dispense Refill  atorvastatin (LIPITOR) 40 mg tablet Take 1 Tab by mouth daily. 30 Tab 11  
 gabapentin (NEURONTIN) 600 mg tablet TAKE 1 TABLET BY MOUTH 3 TIMES A DAY 90 Tab 5  
 benazepril (LOTENSIN) 40 mg tablet Take 1 Tab by mouth daily. 90 Tab 3  
 diazePAM (VALIUM) 5 mg tablet Take 1 Tab by mouth two (2) times a day.  Max Daily Amount: 10 mg. 60 Tab 0  
  metFORMIN (GLUCOPHAGE) 1,000 mg tablet Take 1 Tab by mouth two (2) times daily (with meals). 180 Tab 3  carvedilol (COREG) 6.25 mg tablet Take 1 Tab by mouth two (2) times daily (with meals). 60 Tab 5  clopidogrel (PLAVIX) 75 mg tab Take 1 Tab by mouth daily. 90 Tab 3  
 acetaminophen (TYLENOL) 325 mg tablet Take  by mouth every four (4) hours as needed for Pain.  aspirin 81 mg chewable tablet Take 81 mg by mouth daily.  traMADol (ULTRAM) 50 mg tablet Take 50 mg by mouth every six (6) hours as needed for Pain.  lansoprazole (PREVACID) 30 mg capsule Take 1 Cap by mouth Daily (before breakfast). 90 Cap 3  
 amLODIPine (NORVASC) 5 mg tablet Take 1 Tab by mouth daily. 90 Tab 3  
 triamcinolone (ARISTOCORT) 0.5 % topical cream Apply  to affected area two (2) times a day. use thin layer 60 g 3  
 naloxone (NARCAN) 4 mg/actuation nasal spray Use 1 spray intranasally into 1 nostril. Use a new Narcan nasal spray for subsequent doses and administer into alternating nostrils. May repeat every 2 to 3 minutes as needed. 1 Each 1  
 cyclobenzaprine (FLEXERIL) 10 mg tablet Take 1 Tab by mouth three (3) times daily as needed for Muscle Spasm(s). 30 Tab 1 No Known Allergies Family History Problem Relation Age of Onset  Heart Disease Mother  Stroke Father Social History Substance Use Topics  Smoking status: Former Smoker Packs/day: 1.00 Quit date: 10/22/2011  Smokeless tobacco: Never Used  Alcohol use No  
 
Diet, Lifestyle: generally follows a low fat low cholesterol diet, follows a diabetic diet regularly, sedentary Exercise level: not active Depression Risk Screen PHQ over the last two weeks 10/3/2018 Little interest or pleasure in doing things Not at all Feeling down, depressed, irritable, or hopeless Not at all Total Score PHQ 2 0 Immunization History Administered Date(s) Administered  Influenza High Dose Vaccine PF 01/17/2017, 11/30/2017  Influenza Vaccine 10/01/2012  Influenza Vaccine (Tri) Adjuvanted 10/03/2018  Influenza Vaccine Split 10/14/2011  Pneumococcal Conjugate (PCV-13) 07/13/2016  Pneumococcal Polysaccharide (PPSV-23) 03/30/2018  Tdap 02/17/2014  ZZZ-RETIRED (DO NOT USE) Pneumococcal Vaccine (Unspecified Type) 08/17/2010  Zoster 08/18/2010 SCREENINGS Colonoscopy last done 2014 Dr Pozo Needs Alcohol Risk Screen On any occasion during the past 3 months, have you had more than 4 drinks containing alcohol? No 
 
Do you average more than 14 drinks per week? No 
 
Functional Ability and Level of Safety Hearing Loss  
mild Activities of Daily Living Self-care Fall Risk Screen Fall Risk Assessment, last 12 mths 10/3/2018 Able to walk? Yes Fall in past 12 months? No  
Fall with injury? -  
Number of falls in past 12 months - Fall Risk Score -  
 
Abuse Screen Patient is not abused Review of Systems A comprehensive review of systems was negative except for that written in the HPI. Physical Examination Visit Vitals  /68  Pulse 71  Temp 98.2 °F (36.8 °C) (Oral)  Resp 14  
 Ht 5' 9\" (1.753 m)  Wt 176 lb (79.8 kg)  SpO2 96%  BMI 25.99 kg/m2 Patient Care Team: 
Ruben Somers MD as PCP - General (Internal Medicine) MICK Almonte (Vascular Surgery) Ramya Edmond, RN as Ambulatory Care Navigator (Internal Medicine) Jose Alejandro Cruz MD (Ophthalmology) Maci Willis, RN as Ambulatory Care Navigator (Internal Medicine) End-of-life planning Advanced Directive discussed and documented: YES Assessment/Plan Education and counseling provided: 
Are appropriate based on today's review and evaluation End-of-Life planning (with patient's consent) Pneumococcal Vaccine Colorectal cancer screening tests Cardiovascular screening blood test 
Diabetes screening test  
 
 ICD-10-CM ICD-9-CM 1. Medicare annual wellness visit, subsequent Z00.00 V70.0 2. Advanced directives, counseling/discussion Z71.89 V65.49 ADVANCE CARE PLANNING FIRST 30 MINS 3. Screening for alcoholism Z13.39 V79.1 IN ANNUAL ALCOHOL SCREEN 15 MIN 4. Screening for depression Z13.31 V79.0 [address] 5. Screen for colon cancer Z12.11 V76.51   
6. Screening for diabetes mellitus Z13.1 V77.1 7. Screening for ischemic heart disease Z13.6 V81.0 8. Special screening for malignant neoplasm of prostate Z12.5 V76.44 IN PROSTATE CA SCREENING; CHASITY  
9. Encounter for immunization Z23 V03.89 INFLUENZA VACCINE INACTIVATED (IIV), SUBUNIT, ADJUVANTED, IM  
   ADMIN INFLUENZA VIRUS VAC 10. Peripheral vascular disease s/p left iliac bpg Dr. Tamia Boggs 10/10 I73.9 443.9 11. Controlled type 2 diabetes mellitus with albuminuria E11.29 250.40 HEMOGLOBIN A1C W/O EAG  
 R80.9 791.0 CBC W/O DIFF 12. Chronic kidney disease (CKD) stage G3a/A3 N18.3 585.3 13. Left renal artery stenosis (HCC) I70.1 440.1 14. Hyperlipidemia, unspecified hyperlipidemia type E78.5 272.4 LIPID PANEL  
   METABOLIC PANEL, COMPREHENSIVE  
   atorvastatin (LIPITOR) 40 mg tablet 15. Chronic low back pain without sciatica, unspecified back pain laterality M54.5 724.2 G89.29 338.29   
16. Primary hypertension I10 401.9 17. GERD without esophagitis K21.9 530.81   
18. Overweight (BMI 25.0-29. 9) E66.3 278.02   
 
current treatment plan is effective 
lab results and schedule of future lab studies reviewed with patient 
cardiovascular risk and specific lipid/LDL goals reviewed. End of life discussion undertaken. Has medical directive in place Flu high dose Colonoscopy to be scheduled 2024

## 2018-10-03 NOTE — PROGRESS NOTES
1. Have you been to the ER, urgent care clinic or hospitalized since your last visit? NO.  
 
2. Have you seen or consulted any other health care providers outside of the [address] [place] [address] since your last visit (Include any pap smears or colon screening)? NO Chief Complaint Patient presents with  Diabetes 6 month follow up with labs Bubba Andino Annual Wellness Visit Karly Bernard is a 76 y.o. male who presents for routine immunizations. Per verbal order from [address] Influenza given right deltoid. He denies any symptoms , reactions or allergies that would exclude them from being immunized today. Risks and adverse reactions were discussed and the VIS was given to them. All questions were addressed. He was observed for 15 min post injection. There were no reactions observed.  
 
Penelope Christianson LPN

## 2018-10-03 NOTE — MR AVS SNAPSHOT
[address] [facility] 
 
 
 [address], [facility] [address] [facility] [address] 
[phone] Patient: Jennifer Lares MRN: [id number] Visit Information Date & Time Provider Department Dept. Phone Encounter #  
 10/3/2018  8:20 Harriett Evangelista MD [facility] [address] [place] [address] [phone] [id number] Your Appointments 11/1/2018  8:00 AM  
PROCEDURE with [facility] IMAGING 2 [facility] ([address] [place] [address]) Appt Note: RENAL 10 [facility] [place] [address] [place] [address] 058 200 [facility] [address]  
[phone] [address] [address]  
  
    
 11/1/2018  9:00 AM  
PROCEDURE with [facility] NONIMAGING [facility] ([address] [place] [address]) Appt Note: LEG ART  [address] [place], [place] [address] [address] [facility] [address]  
[phone] [address] [facility]  
  
    
 11/6/2018  9:30 AM  
Follow Up with MICK Bill [facility] ([address] [place] [address]) Appt Note: follow up after studies [address] 178 [address] [facility] [address]  
[phone] [address] 47 [facility]  
  
    
 1/28/2019  8:35 AM  
LAB with [facility] NURSE VISIT [facility] [address] [place] [address] ([address] [place] [address]) Appt Note: labs [address], [facility] [address] [place]  
  
   
 [address], [address]  
  
    
 2/4/2019  8:40 AM  
Office Visit with Renu Seaman MD  
[facility] [address] [place] [address]) Appt Note: ov [address]  
 [address], [address] [address] [address] [place] [place]  
  
   
 [address], [address] Upcoming Health Maintenance Date Due Shingrix Vaccine Age 50> (1 of 2) 7/11/2000 Influenza Age 5 to Adult 8/1/2018 HEMOGLOBIN A1C Q6M 3/26/2019 FOOT EXAM Q1 3/31/2019 COLONOSCOPY 6/18/2019 EYE EXAM RETINAL OR DILATED Q1 6/20/2019 LIPID PANEL Q1 9/26/2019 MEDICARE YEARLY EXAM 10/4/2019 GLAUCOMA SCREENING Q2Y 6/20/2020 DTaP/Tdap/Td series (2 - Td) 2/17/2024 Allergies as of 10/3/2018  Review Complete On: 10/3/2018 By: Walter Jean LPN No Known Allergies Current Immunizations  Reviewed on 11/30/2017 Name Date Influenza High Dose Vaccine PF 11/30/2017  8:19 AM, 1/17/2017 Influenza Vaccine 10/1/2012 Influenza Vaccine (Tri) Adjuvanted  Incomplete Influenza Vaccine Split 10/14/2011 Pneumococcal Conjugate (PCV-13) 7/13/2016 Pneumococcal Polysaccharide (PPSV-23) 3/30/2018  9:24 AM  
 Tdap 2/17/2014 ZZZ-RETIRED (DO NOT USE) Pneumococcal Vaccine (Unspecified Type) 8/17/2010 Zoster 8/18/2010 Not reviewed this visit You Were Diagnosed With   
  
 Codes Comments Medicare annual wellness visit, subsequent    -  Primary ICD-10-CM: Z00.00 ICD-9-CM: V70.0 Advanced directives, counseling/discussion     ICD-10-CM: Z71.89 ICD-9-CM: V65.49 Screening for alcoholism     ICD-10-CM: Z13.39 
ICD-9-CM: V79.1 Screening for depression     ICD-10-CM: Z13.31 
ICD-9-CM: V79.0 Screen for colon cancer     ICD-10-CM: Z12.11 ICD-9-CM: V76.51 Screening for diabetes mellitus     ICD-10-CM: Z13.1 ICD-9-CM: V77.1 Screening for ischemic heart disease     ICD-10-CM: Z13.6 ICD-9-CM: V81.0 Special screening for malignant neoplasm of prostate     ICD-10-CM: Z12.5 ICD-9-CM: V76.44 Encounter for immunization     ICD-10-CM: S29 ICD-9-CM: V03.89 Vitals BP Pulse Temp Resp Height(growth percentile) Weight(growth percentile) 120/68 71 98.2 °F (36.8 °C) (Oral) 14 5' 9\" (1.753 m) 176 lb (79.8 kg) SpO2 BMI Smoking Status 96% 25.99 kg/m2 Former Smoker Vitals History BMI and BSA Data  Body Mass Index Body Surface Area  
 25.99 kg/m 2 1.97 m 2  
  
  
 Preferred Pharmacy Pharmacy Name Phone [address], [address] [facility] [phone] Your Updated Medication List  
  
   
This list is accurate as of 10/3/18  8:58 AM.  Always use your most recent med list. amLODIPine 5 mg tablet Commonly known as:  Pasty Pall Take 1 Tab by mouth daily. aspirin 81 mg chewable tablet Take 81 mg by mouth daily. atorvastatin 10 mg tablet Commonly known as:  LIPITOR Take 1 Tab by mouth daily. benazepril 40 mg tablet Commonly known as:  LOTENSIN Take 1 Tab by mouth daily. carvedilol 6.25 mg tablet Commonly known as:  Joycie Violetta Take 1 Tab by mouth two (2) times daily (with meals). clopidogrel 75 mg Tab Commonly known as:  PLAVIX Take 1 Tab by mouth daily. cyclobenzaprine 10 mg tablet Commonly known as:  FLEXERIL Take 1 Tab by mouth three (3) times daily as needed for Muscle Spasm(s). diazePAM 5 mg tablet Commonly known as:  VALIUM Take 1 Tab by mouth two (2) times a day. Max Daily Amount: 10 mg.  
  
 gabapentin 600 mg tablet Commonly known as:  NEURONTIN  
TAKE 1 TABLET BY MOUTH 3 TIMES A DAY  
  
 lansoprazole 30 mg capsule Commonly known as:  PREVACID Take 1 Cap by mouth Daily (before breakfast). metFORMIN 1,000 mg tablet Commonly known as:  GLUCOPHAGE Take 1 Tab by mouth two (2) times daily (with meals). naloxone 4 mg/actuation nasal spray Commonly known as:  [employer] Use 1 spray intranasally into 1 nostril. Use a new Narcan nasal spray for subsequent doses and administer into alternating nostrils. May repeat every 2 to 3 minutes as needed. traMADol 50 mg tablet Commonly known as:  ULTRAM  
Take 50 mg by mouth every six (6) hours as needed for Pain.  
  
 triamcinolone 0.5 % topical cream  
Commonly known as:  ARISTOCORT Apply  to affected area two (2) times a day. use thin layer TYLENOL 325 mg tablet Generic drug:  acetaminophen Take  by mouth every four (4) hours as needed for Pain. We Performed the Following ADMIN INFLUENZA VIRUS VAC [ [facility]S] ADVANCE CARE PLANNING FIRST 30 MINS [33461 CPT(R)] Skyler 68 [[id number] HCPCS] INFLUENZA VACCINE INACTIVATED (IIV), SUBUNIT, ADJUVANTED, IM [id number] CPT(R)] OR ANNUAL ALCOHOL SCREEN 15 MIN [id number] HCPCS] OR PROSTATE CA SCREENING; CHASITY [ HCPCS] Patient Instructions Medicare Wellness Visit, Male The best way to live healthy is to have a lifestyle where you eat a well-balanced diet, exercise regularly, limit alcohol use, and quit all forms of tobacco/nicotine, if applicable. Regular preventive services are another way to keep healthy. Preventive services (vaccines, screening tests, monitoring & exams) can help personalize your care plan, which helps you manage your own care. Screening tests can find health problems at the earliest stages, when they are easiest to treat. [address] [facility] follows the current, evidence-based guidelines published by the [facility] Cristian Brown ([facility]) when recommending preventive services for our patients. Because we follow these guidelines, sometimes recommendations change over time as research supports it. (For example, a prostate screening blood test is no longer routinely recommended for men with no symptoms.) Of course, you and your doctor may decide to screen more often for some diseases, based on your risk and co-morbidities (chronic disease you are already diagnosed with). Preventive services for you include: - Medicare offers their members a free annual wellness visit, which is time for you and your primary care provider to discuss and plan for your preventive service needs. Take advantage of this benefit every year! 
-All adults over age 72 should receive the recommended pneumonia vaccines. Current USPSTF guidelines recommend a series of two vaccines for the best pneumonia protection.  
-All adults should have a flu vaccine yearly and an ECG. All adults age 61 and older should receive a shingles vaccine once in their lifetime.   
-All adults age 38-68 who are overweight should have a diabetes screening test once every three years.  
-Other screening tests & preventive services for persons with diabetes include: an eye exam to screen for diabetic retinopathy, a kidney function test, a foot exam, and stricter control over your cholesterol.  
-Cardiovascular screening for adults with routine risk involves an electrocardiogram (ECG) at intervals determined by the provider.  
-Colorectal cancer screening should be done for adults age 54-65 with no increased risk factors for colorectal cancer. There are a number of acceptable methods of screening for this type of cancer. Each test has its own benefits and drawbacks. Discuss with your provider what is most appropriate for you during your annual wellness visit. The different tests include: colonoscopy (considered the best screening method), a fecal occult blood test, a fecal DNA test, and sigmoidoscopy. 
-All adults born between [facility] should be screened once for Hepatitis C. 
-An Abdominal Aortic Aneurysm (AAA) Screening is recommended for men age 73-68 who has ever smoked in their lifetime. Here is a list of your current Health Maintenance items (your personalized list of preventive services) with a due date: 
Health Maintenance Due Topic Date Due  Shingles Vaccine (1 of 2) 07/11/2000 [facility] Annual Well Visit  07/21/2018  Flu Vaccine  08/01/2018 Vaccine Information Statement Influenza (Flu) Vaccine (Inactivated or Recombinant): What you need to know Many Vaccine Information Statements are available in [place] and other languages. See www.immunize.org/vis Hojas de Información Sobre Vacunas están disponibles en Español y en miranda geiger. Visite www.immunize.org/vis 1. Why get vaccinated? Influenza (flu) is a contagious disease that spreads around the [place] every year, usually between October and May. Flu is caused by influenza viruses, and is spread mainly by coughing, sneezing, and close contact. Anyone can get flu. Flu strikes suddenly and can last several days. Symptoms vary by age, but can include: 
 fever/chills  sore throat  muscle aches  fatigue  cough  headache  runny or stuffy nose Flu can also lead to pneumonia and blood infections, and cause diarrhea and seizures in children. If you have a medical condition, such as heart or lung disease, flu can make it worse. Flu is more dangerous for some people. Infants and young children, people 72years of age and older, pregnant women, and people with certain health conditions or a weakened immune system are at greatest risk. Each year thousands of people in the [facility] die from flu, and many more are hospitalized. Flu vaccine can: 
 keep you from getting flu, 
 make flu less severe if you do get it, and 
 keep you from spreading flu to your family and other people. 2. Inactivated and recombinant flu vaccines A dose of flu vaccine is recommended every flu season. Children 6 months through 6years of age may need two doses during the same flu season. Everyone else needs only one dose each flu season. Some inactivated flu vaccines contain a very small amount of a mercury-based preservative called thimerosal. Studies have not shown thimerosal in vaccines to be harmful, but flu vaccines that do not contain thimerosal are available. There is no live flu virus in flu shots. They cannot cause the flu. There are many flu viruses, and they are always changing.  Each year a new flu vaccine is made to protect against three or four viruses that are likely to cause disease in the upcoming flu season. But even when the vaccine doesnt exactly match these viruses, it may still provide some protection Flu vaccine cannot prevent: 
 flu that is caused by a virus not covered by the vaccine, or 
 illnesses that look like flu but are not. It takes about 2 weeks for protection to develop after vaccination, and protection lasts through the flu season. 3. Some people should not get this vaccine Tell the person who is giving you the vaccine:  If you have any severe, life-threatening allergies. If you ever had a life-threatening allergic reaction after a dose of flu vaccine, or have a severe allergy to any part of this vaccine, you may be advised not to get vaccinated. Most, but not all, types of flu vaccine contain a small amount of egg protein.  If you ever had Guillain-Barré Syndrome (also called GBS). Some people with a history of GBS should not get this vaccine. This should be discussed with your doctor.  If you are not feeling well. It is usually okay to get flu vaccine when you have a mild illness, but you might be asked to come back when you feel better. 4. Risks of a vaccine reaction With any medicine, including vaccines, there is a chance of reactions. These are usually mild and go away on their own, but serious reactions are also possible. Most people who get a flu shot do not have any problems with it. Minor problems following a flu shot include:  
 soreness, redness, or swelling where the shot was given  hoarseness  sore, red or itchy eyes  cough  fever  aches  headache  itching  fatigue If these problems occur, they usually begin soon after the shot and last 1 or 2 days. More serious problems following a flu shot can include the following:  There may be a small increased risk of Guillain-Barré Syndrome (GBS) after inactivated flu vaccine.   This risk has been estimated at 1 or 2 additional cases per million people vaccinated. This is much lower than the risk of severe complications from flu, which can be prevented by flu vaccine.  Young children who get the flu shot along with pneumococcal vaccine (PCV13) and/or DTaP vaccine at the same time might be slightly more likely to have a seizure caused by fever. Ask your doctor for more information. Tell your doctor if a child who is getting flu vaccine has ever had a seizure. Problems that could happen after any injected vaccine:  People sometimes faint after a medical procedure, including vaccination. Sitting or lying down for about 15 minutes can help prevent fainting, and injuries caused by a fall. Tell your doctor if you feel dizzy, or have vision changes or ringing in the ears.  Some people get severe pain in the shoulder and have difficulty moving the arm where a shot was given. This happens very rarely.  Any medication can cause a severe allergic reaction. Such reactions from a vaccine are very rare, estimated at about 1 in a million doses, and would happen within a few minutes to a few hours after the vaccination. As with any medicine, there is a very remote chance of a vaccine causing a serious injury or death. The safety of vaccines is always being monitored. For more information, visit: www.cdc.gov/vaccinesafety/ 
 
5. What if there is a serious reaction? What should I look for?  Look for anything that concerns you, such as signs of a severe allergic reaction, very high fever, or unusual behavior. Signs of a severe allergic reaction can include hives, swelling of the face and throat, difficulty breathing, a fast heartbeat, dizziness, and weakness  usually within a few minutes to a few hours after the vaccination. What should I do?  
 
 If you think it is a severe allergic reaction or other emergency that cant wait, call 9-1-1 and get the person to the nearest hospital. Otherwise, call your doctor.  Reactions should be reported to the Vaccine Adverse Event Reporting System (VAERS). Your doctor should file this report, or you can do it yourself through  the VAERS web site at www.vaers. hhs.gov, or by calling 7-884.245.8511. VAERS does not give medical advice. 6. The National Vaccine Injury Compensation Program 
 
The [facility] Vaccine Injury Compensation Program (VICP) is a federal program that was created to compensate people who may have been injured by certain vaccines. Persons who believe they may have been injured by a vaccine can learn about the program and about filing a claim by calling 8-327.379.1932 or visiting the [employer] website at www.[facility].gov/vaccinecompensation. There is a time limit to file a claim for compensation. 7. How can I learn more?  Ask your healthcare provider. He or she can give you the vaccine package insert or suggest other sources of information.  Call your local or state health department.  Contact the Centers for Disease Control and Prevention (CDC): 
- Call 5-271.765.8345 (1-800-CDC-INFO) or 
- Visit CDCs website at www.cdc.gov/flu Vaccine Information Statement Inactivated Influenza Vaccine 8/7/2015 
[address] [place] [id number] [facility] of [facility] and [facility] Centers for Disease Control and Prevention Office Use Only Introducing [facility]! [employer] introduces [employer] patient portal. Now you can access parts of your medical record, email your doctor's office, and request medication refills online. 1. In your internet browser, go to [email or web address] in [email or web address]oupt 2. Click on the First Time User? Click Here link in the Sign In box. You will see the New Member Sign Up page. 3. Enter your [employer] Access Code exactly as it appears below. You will not need to use this code after youve completed the sign-up process.  If you do not sign up before the expiration date, you must request a new code. 
 
· M-DAQ Access Code: [email or web address] Expires: 1/1/2019  8:58 AM 
 
4. Enter the last four digits of your Social Security Number (xxxx) and Date of Birth (mm/dd/yyyy) as indicated and click Submit. You will be taken to the next sign-up page. 5. Create a M-DAQ ID. This will be your M-DAQ login ID and cannot be changed, so think of one that is secure and easy to remember. 6. Create a M-DAQ password. You can change your password at any time. 7. Enter your Password Reset Question and Answer. This can be used at a later time if you forget your password. 8. Enter your e-mail address. You will receive e-mail notification when new information is available in [address] 9. Click Sign Up. You can now view and download portions of your medical record. 10. Click the Download Summary menu link to download a portable copy of your medical information. If you have questions, please visit the Frequently Asked Questions section of the M-DAQ website. Remember, M-DAQ is NOT to be used for urgent needs. For medical emergencies, dial 911. Now available from your iPhone and Android! Please provide this summary of care documentation to your next provider. Your primary care clinician is listed as Maribel Ocasio. If you have any questions after today's visit, please call [phone].

## 2018-10-16 DIAGNOSIS — M19.90 OSTEOARTHRITIS, UNSPECIFIED OSTEOARTHRITIS TYPE, UNSPECIFIED SITE: ICD-10-CM

## 2018-10-16 RX ORDER — DIAZEPAM 5 MG/1
5 TABLET ORAL 2 TIMES DAILY
Qty: 60 TAB | Refills: 0 | OUTPATIENT
Start: 2018-10-16 | End: 2018-11-28 | Stop reason: SDUPTHER

## 2018-10-16 NOTE — TELEPHONE ENCOUNTER
PHONE IN [facility] reports the last fill date for Valium as 08/03/2018. There appears to be no inconsistencies in regards to the prescribing of this medication. Last Visit: 10/03/2018 with MD Milan Cage    Next Appointment: 02/04/2019 with MD Milan Cage   Previous Refill Encounters: 08/02/2019 with MD Milan Cage #[id number]    Requested Prescriptions     Pending Prescriptions Disp Refills    diazePAM (VALIUM) 5 mg tablet 60 Tab 0     Sig: Take 1 Tab by mouth two (2) times a day. Max Daily Amount: 10 mg.

## 2018-11-06 ENCOUNTER — OFFICE VISIT (OUTPATIENT)
Dept: VASCULAR SURGERY | Age: 68
End: 2018-11-06

## 2018-11-06 VITALS
WEIGHT: 176 LBS | HEIGHT: 69 IN | RESPIRATION RATE: 16 BRPM | HEART RATE: 72 BPM | SYSTOLIC BLOOD PRESSURE: 110 MMHG | BODY MASS INDEX: 26.07 KG/M2 | DIASTOLIC BLOOD PRESSURE: 60 MMHG

## 2018-11-06 DIAGNOSIS — I73.9 PAD (PERIPHERAL ARTERY DISEASE) (HCC): ICD-10-CM

## 2018-11-06 DIAGNOSIS — I65.23 CAROTID STENOSIS, ASYMPTOMATIC, BILATERAL: Primary | ICD-10-CM

## 2018-11-06 NOTE — PROGRESS NOTES
Neto Saunders Chief Complaint Patient presents with  Carotid Artery Stenosis History and Physical   
Mr Candice Washburn is here for a yearly follow up surveillance visit, with remote history of aorto bi fem bypass for symptomatic claudication. He has denied any recurrent claudication. He has continued to have occasional back pain but no leg concerns presently. He has continued to remain tobacco free and is on good risk factor control otherwise 
  
He was told previously that he had stage III kidney disease. Had renal ultrasound, and was found to have about 60% renal artery stenosis (unilateral). There was never any follow up with nephrology. He then had some repeat labs in November, and results were much improved His BP is otherwise well controlled and no elevations in bun/cr We repeated renal imaging for this visit, see below Past Medical History:  
Diagnosis Date  Anemia   
 fe def w gi w/u Dr Chaz Schneider  Basal cell carcinoma   
 s/p resection Dr. Alex Menendez  Chronic back pain inoperable Dr. Meredith Luis and Dr. Erin Lock  Colon adenoma   
 and diverticulosis Dr Chaz Schneider 2014  Diabetes mellitus (HCC)   
 microalbuminuria  DJD (degenerative joint disease)  Dyshidrotic eczema  Dyslipidemia  ED (erectile dysfunction)  FHx: heart disease  Gastritis 2014 Dr Chaz Schneider neg h pylori  GERD (gastroesophageal reflux disease) 2014  
 on EGD Dr Chaz Schneider  Hypertension  Left renal artery stenosis ([facility] [address] 11/30/2017  Meniere's disease   
 s/p surgery Dr Will Snowden  Overweight (BMI 25.0-29.9) 11/30/2017  PAD (peripheral artery disease) ([facility] [address] Patient Active Problem List  
Diagnosis Code  Hyperlipidemia E78.5  Chronic back pain inoperable Dr. Meredith Luis and Dr. Erin Lock M54.9, S88.95  
 Peripheral vascular disease s/p left iliac bpg Dr. Flor Padron 10/10 I73.9  Erectile dysfunction N52.9  Arthritis, degenerative M19.90  
 Primary hypertension I10  
 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 9) E66.3 Past Surgical History:  
Procedure Laterality Date  CARDIAC SURG PROCEDURE UNLIST  10/10 NST neg EF 68%  CARDIAC SURG PROCEDURE UNLIST  02/2018  
 echo nl LV, ef 62%, mild mr/tr/pr, rvp 33 [facility]  
 HX CHOLECYSTECTOMY  2011 Dr. Lucien Arita HX COLONOSCOPY  2014  
 adenoma and divertics  [facility]  HX GI  2014  
 pill camera non bleeding ileal AVM  HX TONSILLECTOMY  SINUS SURGERY PROC UNLISTED    
 left ear surgery Dr Romain Antony for meniere's  VASCULAR SURGERY PROCEDURE UNLIST  10/10 Illiac Bypass left Graft Surgery  Dr. Woodward Drivers Current Outpatient Medications Medication Sig Dispense Refill  diazePAM (VALIUM) 5 mg tablet Take 1 Tab by mouth two (2) times a day. Max Daily Amount: 10 mg. 60 Tab 0  
 atorvastatin (LIPITOR) 40 mg tablet Take 1 Tab by mouth daily. 30 Tab 11  
 gabapentin (NEURONTIN) 600 mg tablet TAKE 1 TABLET BY MOUTH 3 TIMES A DAY 90 Tab 5  
 benazepril (LOTENSIN) 40 mg tablet Take 1 Tab by mouth daily. 90 Tab 3  
 metFORMIN (GLUCOPHAGE) 1,000 mg tablet Take 1 Tab by mouth two (2) times daily (with meals). 180 Tab 3  carvedilol (COREG) 6.25 mg tablet Take 1 Tab by mouth two (2) times daily (with meals). 60 Tab 5  clopidogrel (PLAVIX) 75 mg tab Take 1 Tab by mouth daily. 90 Tab 3  
 acetaminophen (TYLENOL) 325 mg tablet Take  by mouth every four (4) hours as needed for Pain.  aspirin 81 mg chewable tablet Take 81 mg by mouth daily.  traMADol (ULTRAM) 50 mg tablet Take 50 mg by mouth every six (6) hours as needed for Pain.  lansoprazole (PREVACID) 30 mg capsule Take 1 Cap by mouth Daily (before breakfast).  90 Cap 3  
  amLODIPine (NORVASC) 5 mg tablet Take 1 Tab by mouth daily. 90 Tab 3  
 triamcinolone (ARISTOCORT) 0.5 % topical cream Apply  to affected area two (2) times a day. use thin layer 60 g 3  
 naloxone (NARCAN) 4 mg/actuation nasal spray Use 1 spray intranasally into 1 nostril. Use a new Narcan nasal spray for subsequent doses and administer into alternating nostrils. May repeat every 2 to 3 minutes as needed. 1 Each 1  
 cyclobenzaprine (FLEXERIL) 10 mg tablet Take 1 Tab by mouth three (3) times daily as needed for Muscle Spasm(s). 30 Tab 1 No Known Allergies Review of Systems Review of Systems - History obtained from the patient General ROS: negative Psychological ROS: negative Respiratory ROS: negative Cardiovascular ROS: negative Gastrointestinal ROS: negative Musculoskeletal ROS: positive for - pain in back - lower Neurological ROS: negative Dermatological ROS: negative Vascular ROS: no claudication or leg edema Physical  
Visit Vitals /60 (BP 1 Location: Left arm, BP Patient Position: Sitting) Pulse 72 Resp 16 Ht 5' 9\" (1.753 m) Wt 176 lb (79.8 kg) BMI 25.99 kg/m² General:  Alert, cooperative, no distress. Head:  Normocephalic, without obvious abnormality, atraumatic. Eyes: 
   Conjunctivae/corneas clear. Pupils equal, round, reactive to light. Extraocular movements intact. Neck:         No bruits Lungs:   Clear to auscultation bilaterally. Heart:  Regular rate and rhythm, S1, S2 normal  
Extremities: Extremities normal, atraumatic, no cyanosis or edema. Pulses: 1+ and symmetric all extremities. Skin: Skin color, texture, turgor normal. No rashes or lesions. Vascular studies: No evidence of arterial insufficiency right lower extremity at rest or with exercise. Mild arterial insufficiency noted left lower extremity with preserved multiphasic waveforms. 15% dropout with exercise, maintains multiphasic left side. Limited study due to overlying bowel gas. Right proximal mid and distal renal artery without evidence of stenosis. Right renal vein is patent. Left proximal mid and distal renal artery without evidence of stenosis. Cyst noted on left kidney 3.37 x 3.15 cm. Left renal vein has normal flow. Impression/Plan: ICD-10-CM ICD-9-CM 1. Carotid stenosis, asymptomatic, bilateral I65.23 433.10 DUPLEX CAROTID BILATERAL  
  433.30 2. PAD (peripheral artery disease) ([facility]) I73.9 443.9 LOWER EXT ART PVR MULT LEVEL SEG PRESSURES No orders of the defined types were placed in this encounter. Discussed results. Carotids are being done every 2 years and that is due next year Reviewed claudication. Though some mild decreases in rubens with exercise, resting rubens normal. He is fully aware of claudication symptoms, remembering what his legs felt like prior to his bypass. He states he will notify us if any changes As for renals, with our lab showing no stenosis, will only repeat if new concerns with BP or labs So will repeat leg study and carotid study in one year Follow-up Disposition: 
Return in about 1 year (around 11/6/2019). MICK Bill Portions of this note have been entered using voice recognition software.

## 2018-11-06 NOTE — PROGRESS NOTES
1. Have you been to an emergency room or urgent care clinic since your last visit? NO Hospitalized since your last visit? If yes, where, when, and reason for visit? NO 
2. Have you seen or consulted any other health care providers outside of the [facility] since your last visit including any procedures, health maintenance items. If yes, where, when and reason for visit?  NO

## 2018-11-28 DIAGNOSIS — K21.9 GERD WITHOUT ESOPHAGITIS: ICD-10-CM

## 2018-11-28 DIAGNOSIS — M19.90 OSTEOARTHRITIS, UNSPECIFIED OSTEOARTHRITIS TYPE, UNSPECIFIED SITE: ICD-10-CM

## 2018-11-29 ENCOUNTER — TELEPHONE (OUTPATIENT)
Dept: INTERNAL MEDICINE CLINIC | Age: 68
End: 2018-11-29

## 2018-11-29 RX ORDER — LANSOPRAZOLE 30 MG/1
CAPSULE, DELAYED RELEASE ORAL
Qty: 90 CAP | Refills: 3 | Status: SHIPPED | OUTPATIENT
Start: 2018-11-29 | End: 2019-11-12 | Stop reason: SDUPTHER

## 2018-11-29 RX ORDER — DIAZEPAM 5 MG/1
TABLET ORAL
Qty: 60 TAB | Refills: 0 | Status: SHIPPED | OUTPATIENT
Start: 2018-11-29 | End: 2018-12-26 | Stop reason: SDUPTHER

## 2018-12-04 DIAGNOSIS — I10 PRIMARY HYPERTENSION: ICD-10-CM

## 2018-12-04 NOTE — TELEPHONE ENCOUNTER
Last Visit: 10/03/2018 with MD Candido Eaton  Next Appointment: 02/04/2019 with MD Candido Eaton  Previous Refill Encounter(s): 06/08/2018 per NP Kumar Kimble #60 with 5 refills    Requested Prescriptions     Pending Prescriptions Disp Refills    carvedilol (COREG) 6.25 mg tablet 180 Tab 3     Sig: Take 1 Tab by mouth two (2) times daily (with meals).

## 2018-12-05 RX ORDER — CARVEDILOL 6.25 MG/1
6.25 TABLET ORAL 2 TIMES DAILY WITH MEALS
Qty: 180 TAB | Refills: 3 | Status: SHIPPED | OUTPATIENT
Start: 2018-12-05 | End: 2019-02-05 | Stop reason: DRUGHIGH

## 2018-12-26 DIAGNOSIS — M19.90 OSTEOARTHRITIS, UNSPECIFIED OSTEOARTHRITIS TYPE, UNSPECIFIED SITE: ICD-10-CM

## 2018-12-26 RX ORDER — DIAZEPAM 5 MG/1
TABLET ORAL
Qty: 60 TAB | Refills: 0 | OUTPATIENT
Start: 2018-12-26 | End: 2019-01-23 | Stop reason: SDUPTHER

## 2019-01-23 DIAGNOSIS — M19.90 OSTEOARTHRITIS, UNSPECIFIED OSTEOARTHRITIS TYPE, UNSPECIFIED SITE: ICD-10-CM

## 2019-01-23 RX ORDER — DIAZEPAM 5 MG/1
TABLET ORAL
Qty: 60 TAB | Refills: 0 | Status: SHIPPED | OUTPATIENT
Start: 2019-01-23 | End: 2019-02-20 | Stop reason: SDUPTHER

## 2019-01-23 NOTE — TELEPHONE ENCOUNTER
PCP: Penelope Woodward MD    Last appt: 10/3/2018  Last filled 12-26-18  Future Appointments   Date Time Provider Gumaro Mcqueen   1/28/2019  8:35 AM [facility] NURSE VISIT [facility] SCHED   2/4/2019  8:40 AM Penelope Woodward MD [facility] [place] SCHED   11/7/2019 10:00 AM [facility] [place] SCHED   11/7/2019 11:00 AM [facility] NONIMAGING [facility] [place] SCHED   11/7/2019  2:15 PM MICK Potts 2VV [place] SCHED       Requested Prescriptions     Pending Prescriptions Disp Refills    diazePAM (VALIUM) 5 mg tablet 60 Tab 0     Sig: TAKE 1 TABLET BY MOUTH 2 TIMES A DAY NOT TO EXCEED 2 TABLETS DAILY

## 2019-01-24 ENCOUNTER — TELEPHONE (OUTPATIENT)
Dept: INTERNAL MEDICINE CLINIC | Age: 69
End: 2019-01-24

## 2019-01-28 ENCOUNTER — HOSPITAL ENCOUNTER (OUTPATIENT)
Dept: LAB | Age: 69
Discharge: HOME | End: 2019-01-28

## 2019-01-28 ENCOUNTER — APPOINTMENT (OUTPATIENT)
Dept: INTERNAL MEDICINE CLINIC | Age: 69
End: 2019-01-28

## 2019-01-28 PROCEDURE — 99001 SPECIMEN HANDLING PT-LAB: CPT

## 2019-01-29 LAB
ALBUMIN SERPL-MCNC: 4.1 G/DL (ref 3.6–4.8)
ALBUMIN/GLOB SERPL: 1.4 {RATIO} (ref 1.2–2.2)
ALP SERPL-CCNC: 112 IU/L (ref 39–117)
ALT SERPL-CCNC: 11 IU/L (ref 0–44)
AST SERPL-CCNC: 14 IU/L (ref 0–40)
BILIRUB SERPL-MCNC: 0.2 MG/DL (ref 0–1.2)
BUN SERPL-MCNC: 11 MG/DL (ref 8–27)
BUN/CREAT SERPL: 8 (ref 10–24)
CALCIUM SERPL-MCNC: 9.2 MG/DL (ref 8.6–10.2)
CHLORIDE SERPL-SCNC: 101 MMOL/L (ref 96–106)
CHOLEST SERPL-MCNC: 139 MG/DL (ref 100–199)
CO2 SERPL-SCNC: 24 MMOL/L (ref 20–29)
CREAT SERPL-MCNC: 1.43 MG/DL (ref 0.76–1.27)
ERYTHROCYTE [DISTWIDTH] IN BLOOD BY AUTOMATED COUNT: 15.8 % (ref 12.3–15.4)
GLOBULIN SER CALC-MCNC: 3 G/DL (ref 1.5–4.5)
GLUCOSE SERPL-MCNC: 80 MG/DL (ref 65–99)
HBA1C MFR BLD: 5.8 % (ref 4.8–5.6)
HCT VFR BLD AUTO: 35.5 % (ref 37.5–51)
HDLC SERPL-MCNC: 37 MG/DL
HGB BLD-MCNC: 11.3 G/DL (ref 13–17.7)
INTERPRETATION, 910389: NORMAL
INTERPRETATION: NORMAL
LDLC SERPL CALC-MCNC: 73 MG/DL (ref 0–99)
MCH RBC QN AUTO: 27.7 PG (ref 26.6–33)
MCHC RBC AUTO-ENTMCNC: 31.8 G/DL (ref 31.5–35.7)
MCV RBC AUTO: 87 FL (ref 79–97)
PDF IMAGE, 910387: NORMAL
PLATELET # BLD AUTO: 303 X10E3/UL (ref 150–379)
POTASSIUM SERPL-SCNC: 5.6 MMOL/L (ref 3.5–5.2)
PROT SERPL-MCNC: 7.1 G/DL (ref 6–8.5)
RBC # BLD AUTO: 4.08 X10E6/UL (ref 4.14–5.8)
SODIUM SERPL-SCNC: 139 MMOL/L (ref 134–144)
TRIGL SERPL-MCNC: 146 MG/DL (ref 0–149)
VLDLC SERPL CALC-MCNC: 29 MG/DL (ref 5–40)
WBC # BLD AUTO: 11.6 X10E3/UL (ref 3.4–10.8)

## 2019-02-04 ENCOUNTER — OFFICE VISIT (OUTPATIENT)
Dept: INTERNAL MEDICINE CLINIC | Age: 69
End: 2019-02-04

## 2019-02-04 VITALS
DIASTOLIC BLOOD PRESSURE: 58 MMHG | HEIGHT: 69 IN | BODY MASS INDEX: 25.51 KG/M2 | HEART RATE: 73 BPM | SYSTOLIC BLOOD PRESSURE: 122 MMHG | WEIGHT: 172.2 LBS | RESPIRATION RATE: 12 BRPM | TEMPERATURE: 96.9 F | OXYGEN SATURATION: 99 %

## 2019-02-04 DIAGNOSIS — E87.5 HYPERKALEMIA: Primary | ICD-10-CM

## 2019-02-04 DIAGNOSIS — I70.1 LEFT RENAL ARTERY STENOSIS (HCC): ICD-10-CM

## 2019-02-04 DIAGNOSIS — N18.31 CHRONIC KIDNEY DISEASE (CKD) STAGE G3A/A3, MODERATELY DECREASED GLOMERULAR FILTRATION RATE (GFR) BETWEEN 45-59 ML/MIN/1.73 SQUARE METER AND ALBUMINURIA CREATININE RATIO GREATER THAN 300 MG/G (HCC): ICD-10-CM

## 2019-02-04 DIAGNOSIS — R80.9 CONTROLLED TYPE 2 DIABETES MELLITUS WITH MICROALBUMINURIA, WITHOUT LONG-TERM CURRENT USE OF INSULIN (HCC): ICD-10-CM

## 2019-02-04 DIAGNOSIS — I73.9 PAD (PERIPHERAL ARTERY DISEASE) (HCC): ICD-10-CM

## 2019-02-04 DIAGNOSIS — E78.5 HYPERLIPIDEMIA, UNSPECIFIED HYPERLIPIDEMIA TYPE: ICD-10-CM

## 2019-02-04 DIAGNOSIS — I10 PRIMARY HYPERTENSION: ICD-10-CM

## 2019-02-04 DIAGNOSIS — E11.29 CONTROLLED TYPE 2 DIABETES MELLITUS WITH MICROALBUMINURIA, WITHOUT LONG-TERM CURRENT USE OF INSULIN (HCC): ICD-10-CM

## 2019-02-04 RX ORDER — AMLODIPINE BESYLATE 10 MG/1
10 TABLET ORAL DAILY
Qty: 90 TAB | Refills: 3 | Status: SHIPPED | OUTPATIENT
Start: 2019-02-04 | End: 2020-03-16

## 2019-02-04 NOTE — PROGRESS NOTES
Chief Complaint Patient presents with  Diabetes  
  follow up  Cholesterol Problem  
  follow up  Labs  
  done 01-28-19 1. Have you been to the ER, urgent care clinic since your last visit? Hospitalized since your last visit? No 
 
2. Have you seen or consulted any other health care providers outside of the [address] [place] [address] since your last visit? Include any pap smears or colon screening. No 
 
. Health Maintenance Due Topic Date Due  Shingrix Vaccine Age 50> (1 of 2) 07/11/2000  COLONOSCOPY  06/18/2019

## 2019-02-04 NOTE — PATIENT INSTRUCTIONS
Health Maintenance Due Topic Date Due  Shingrix Vaccine Age 50> (1 of 2) 07/11/2000  COLONOSCOPY  06/18/2019

## 2019-02-05 DIAGNOSIS — I10 PRIMARY HYPERTENSION: Primary | ICD-10-CM

## 2019-02-05 RX ORDER — CARVEDILOL 12.5 MG/1
12.5 TABLET ORAL 2 TIMES DAILY
Qty: 180 TAB | Refills: 3 | Status: SHIPPED | OUTPATIENT
Start: 2019-02-05 | End: 2020-02-06

## 2019-02-18 ENCOUNTER — APPOINTMENT (OUTPATIENT)
Dept: INTERNAL MEDICINE CLINIC | Age: 69
End: 2019-02-18

## 2019-02-19 ENCOUNTER — OFFICE VISIT (OUTPATIENT)
Dept: INTERNAL MEDICINE CLINIC | Age: 69
End: 2019-02-19

## 2019-02-19 VITALS
HEIGHT: 69 IN | BODY MASS INDEX: 25.62 KG/M2 | TEMPERATURE: 96.7 F | DIASTOLIC BLOOD PRESSURE: 62 MMHG | OXYGEN SATURATION: 98 % | HEART RATE: 69 BPM | SYSTOLIC BLOOD PRESSURE: 132 MMHG | WEIGHT: 173 LBS | RESPIRATION RATE: 12 BRPM

## 2019-02-19 DIAGNOSIS — R80.9 CONTROLLED TYPE 2 DIABETES MELLITUS WITH MICROALBUMINURIA, WITHOUT LONG-TERM CURRENT USE OF INSULIN (HCC): ICD-10-CM

## 2019-02-19 DIAGNOSIS — E87.5 HYPERKALEMIA: Primary | ICD-10-CM

## 2019-02-19 DIAGNOSIS — I10 PRIMARY HYPERTENSION: ICD-10-CM

## 2019-02-19 DIAGNOSIS — E11.29 CONTROLLED TYPE 2 DIABETES MELLITUS WITH MICROALBUMINURIA, WITHOUT LONG-TERM CURRENT USE OF INSULIN (HCC): ICD-10-CM

## 2019-02-19 PROBLEM — I70.1 LEFT RENAL ARTERY STENOSIS (HCC): Status: RESOLVED | Noted: 2017-11-30 | Resolved: 2019-02-19

## 2019-02-19 LAB
BUN SERPL-MCNC: 16 MG/DL (ref 8–27)
BUN/CREAT SERPL: 10 (ref 10–24)
CALCIUM SERPL-MCNC: 9 MG/DL (ref 8.6–10.2)
CHLORIDE SERPL-SCNC: 102 MMOL/L (ref 96–106)
CO2 SERPL-SCNC: 21 MMOL/L (ref 20–29)
CREAT SERPL-MCNC: 1.54 MG/DL (ref 0.76–1.27)
GLUCOSE SERPL-MCNC: 93 MG/DL (ref 65–99)
INTERPRETATION: NORMAL
POTASSIUM SERPL-SCNC: 5.3 MMOL/L (ref 3.5–5.2)
SODIUM SERPL-SCNC: 136 MMOL/L (ref 134–144)

## 2019-02-19 NOTE — PATIENT INSTRUCTIONS
Health Maintenance Due   Topic Date Due    Shingrix Vaccine Age 49> (1 of 2) 07/11/2000    COLONOSCOPY  06/18/2019

## 2019-02-19 NOTE — PROGRESS NOTES
Chief Complaint   Patient presents with    Hypertension     follow up    Labs     done 02-18-19        1. Have you been to the ER, urgent care clinic since your last visit? Hospitalized since your last visit? No    2. Have you seen or consulted any other health care providers outside of the [address] [place] [address] since your last visit? Include any pap smears or colon screening.  No      Health Maintenance Due   Topic Date Due    Shingrix Vaccine Age 49> (1 of 2) 07/11/2000    COLONOSCOPY  06/18/2019

## 2019-02-19 NOTE — PROGRESS NOTES
76 y.o. WHITE OR  male who presents for evaluation. Is here to follow-up after our medication changes 2 weeks ago. We had noted his potassium to be elevated the last 2 visits, elected to stop the lisinopril. There was also questionable left renal artery stenosis on prior duplex of the follow-up study late 2018 was negative per vascular. In any case, we increased his amlodipine to 10, Coreg to 12-1/2 and stop the lisinopril. He has been getting readings that have dropped from the 140s to the 850 systolic, heart rate has remained in the 70s. No problems otherwise report    Past Medical History:   Diagnosis Date    Anemia     fe def w gi w/u Dr Denise Flavin cell carcinoma     s/p resection Dr. Ada Veloz Chronic back pain inoperable Dr. Ravi Storm and Dr. Aria Paulino Chronic kidney disease 04/2017    saw Dr Damien Sanford    Colon adenoma     and diverticulosis Dr Wagner [place] 2014    Diabetes mellitus ([address]     microalbuminuria    DJD (degenerative joint disease)     Dyshidrotic eczema     Dyslipidemia     ED (erectile dysfunction)     FHx: heart disease     Gastritis     2014 Dr Wagner [place] neg h pylori    GERD (gastroesophageal reflux disease) 2014    on EGD Dr Bernard Yepez    Hypertension     Left renal artery stenosis ([address] 11/30/2017    f/u duplex 11/18 by Isiah Wallace was negative    Meniere's disease     s/p surgery Dr Mio George    Overweight (BMI 25.0-29.9) 11/30/2017    PAD [facility]peripheral artery disease) [facility])      Current Outpatient Medications   Medication Sig    carvedilol (COREG) 12.5 mg tablet Take 1 Tab by mouth two (2) times a day.  amLODIPine (NORVASC) 10 mg tablet Take 1 Tab by mouth daily.  diazePAM (VALIUM) 5 mg tablet TAKE 1 TABLET BY MOUTH 2 TIMES A DAY NOT TO EXCEED 2 TABLETS DAILY    lansoprazole (PREVACID) 30 mg capsule TAKE 1 CAPSULE BY MOUTH ONCE DAILY BEFORE BREAKFAST    atorvastatin (LIPITOR) 40 mg tablet Take 1 Tab by mouth daily.     gabapentin (NEURONTIN) 600 mg tablet TAKE 1 TABLET BY MOUTH 3 TIMES A DAY    metFORMIN (GLUCOPHAGE) 1,000 mg tablet Take 1 Tab by mouth two (2) times daily (with meals).  clopidogrel (PLAVIX) 75 mg tab Take 1 Tab by mouth daily.  naloxone (NARCAN) 4 mg/actuation nasal spray Use 1 spray intranasally into 1 nostril. Use a new Narcan nasal spray for subsequent doses and administer into alternating nostrils. May repeat every 2 to 3 minutes as needed.  acetaminophen (TYLENOL) 325 mg tablet Take  by mouth every four (4) hours as needed for Pain.  aspirin 81 mg chewable tablet Take 81 mg by mouth daily.  traMADol (ULTRAM) 50 mg tablet Take 50 mg by mouth every six (6) hours as needed for Pain.  triamcinolone (ARISTOCORT) 0.5 % topical cream Apply  to affected area two (2) times a day. use thin layer     No current facility-administered medications for this visit. No Known Allergies    Visit Vitals  /62 (BP 1 Location: Left arm, BP Patient Position: Sitting)   Pulse 69   Temp 96.7 °F (35.9 °C) (Oral)   Resp 12   Ht 5' 9\" (1.753 m)   Wt 173 lb (78.5 kg)   SpO2 98%   BMI 25.55 kg/m²   A&O x3  Affect is appropriate. Mood stable  No apparent distress  Anicteric, no JVD, adenopathy or thyromegaly. No carotid bruits or radiated murmur  Lungs clear to auscultation, no wheezes or rales  Heart showed regular rate and rhythm. No murmur, rubs, gallops  Abdomen soft nontender, no hepatosplenomegaly or masses. Extremities without edema.   Pulses 1-2+ symmetrically    Results for orders placed or performed in visit on 47/60/99   METABOLIC PANEL, BASIC   Result Value Ref Range    Glucose 93 65 - 99 mg/dL    BUN 16 8 - 27 mg/dL    Creatinine 1.54 (H) 0.76 - 1.27 mg/dL    GFR est non-AA 46 (L) >59 mL/min/1.73    GFR est AA 53 (L) >59 mL/min/1.73    BUN/Creatinine ratio 10 10 - 24    Sodium 136 134 - 144 mmol/L    Potassium 5.3 (H) 3.5 - 5.2 mmol/L    Chloride 102 96 - 106 mmol/L    CO2 21 20 - 29 mmol/L    Calcium 9.0 8.6 - 10.2 mg/dL   CKD REPORT Result Value Ref Range    Interpretation Note      Assessment and plan:  1. Hypertension. Continue current regimen  2. Hyperkalemia. Improving off ACE inhibitor        RTC 8/19    Above conditions discussed at length and patient vocalized understanding.   All questions answered to patient satisfaction

## 2019-02-20 DIAGNOSIS — M19.90 OSTEOARTHRITIS, UNSPECIFIED OSTEOARTHRITIS TYPE, UNSPECIFIED SITE: ICD-10-CM

## 2019-02-20 RX ORDER — DIAZEPAM 5 MG/1
TABLET ORAL
Qty: 60 TAB | Refills: 0 | OUTPATIENT
Start: 2019-02-20 | End: 2019-03-20 | Stop reason: SDUPTHER

## 2019-02-20 NOTE — TELEPHONE ENCOUNTER
[facility] date for [facility]19 as qty 60 for a 30 day supply at [facility]. There appears to be no inconsistencies in regards to the prescribing of this medication.      Last Visit: 2/19/19  Next Appt: 8/19/19  Previous Refill Encounter: 1/23-60+0    Requested Prescriptions     Pending Prescriptions Disp Refills    diazePAM (VALIUM) 5 mg tablet 60 Tab 0     Sig: TAKE 1 TABLET BY MOUTH 2 TIMES A DAY NOT TO EXCEED 2 TABLETS DAILY

## 2019-03-20 DIAGNOSIS — M19.90 OSTEOARTHRITIS, UNSPECIFIED OSTEOARTHRITIS TYPE, UNSPECIFIED SITE: ICD-10-CM

## 2019-03-20 NOTE — TELEPHONE ENCOUNTER
[facility] reports the last fill date for Valium as 2-21-19 for a 30 d/s. There appears to be no inconsistencies in regards to the prescribing of this medication.      Last Visit: 2-19-19 with MD Pan Pereira  Next Appointment: 8-19-19 with MD Pan Pereira  Previous Refill Encounter(s): 2-20-19 #60    Requested Prescriptions     Pending Prescriptions Disp Refills    diazePAM (VALIUM) 5 mg tablet 60 Tab 0     Sig: TAKE 1 TABLET BY MOUTH 2 TIMES A DAY NOT TO EXCEED 2 TABLETS DAILY

## 2019-03-21 RX ORDER — DIAZEPAM 5 MG/1
TABLET ORAL
Qty: 60 TAB | Refills: 0 | Status: ON HOLD | OUTPATIENT
Start: 2019-03-21 | End: 2019-03-27 | Stop reason: CLARIF

## 2019-03-25 PROBLEM — R56.9 NEW ONSET SEIZURE (HCC): Status: ACTIVE | Noted: 2019-03-25

## 2019-03-27 ENCOUNTER — TELEPHONE (OUTPATIENT)
Dept: INTERNAL MEDICINE CLINIC | Age: 69
End: 2019-03-27

## 2019-03-27 DIAGNOSIS — R56.9 NEW ONSET SEIZURE (HCC): Primary | ICD-10-CM

## 2019-03-27 NOTE — TELEPHONE ENCOUNTER
PT d/c from [facility]- has BILLY  04/02/19-  they want him referred to a neurologist but the 1 they referred him to doesn't take his ins- for seizures- please advise

## 2019-03-28 ENCOUNTER — PATIENT OUTREACH (OUTPATIENT)
Dept: INTERNAL MEDICINE CLINIC | Age: 69
End: 2019-03-28

## 2019-03-28 NOTE — PROGRESS NOTES
Hospital Discharge Follow-Up Date/Time:  3/28/2019 11:12 AM 
 
Patient was admitted to [facility] on 3/25/19 and discharged on 3/27/19 for new onset seizure. The physician discharge summary was available at the time of outreach. Patient was contacted within 1 business days of discharge. Top Challenges reviewed with the provider  
-Was referred to Dr. Ankit Herrera, neurology; wife verbalized this provider does not take [facility] 
-Need repeat BMP and Magnesium Component Latest Ref Rng & Units 3/26/2019 Sodium 136 - 145 mEq/L 140 Potassium 3.5 - 5.1 mEq/L 4.3 Chloride 98 - 107 mEq/L 111 (H)  
CO2 
    21 - 32 mEq/L 24 Glucose 74 - 106 mg/dl 59 (L) BUN 
    7 - 25 mg/dl 17 Creatinine 
    0.6 - 1.3 mg/dl 1.4 (H) GFR est AA 
     >60  
GFR est non-AA 47 Calcium 8.5 - 10.1 mg/dl 8.1 (L) Anion gap 5 - 15 mmol/L 5 Magnesium 1.6 - 2.6 mg/dl Component Latest Ref Rng & Units 3/27/2019 Magnesium 1.6 - 2.6 mg/dl 2.0 Method of communication with provider :chart routing Inpatient RRAT score: 23 Was this a readmission? no  
Patient stated reason for the readmission: N/A Nurse Navigator (NN) contacted the patient by telephone to perform post hospital discharge assessment. Verified name and  with patient as identifiers. Provided introduction to self, and explanation of the Nurse Navigator role. Bot patient and wife participated in call. Patient/wife reported:  
Back to usual self Rash to left ear (this is not new) Patient/wife denied: Any residual s/s Reviewed discharge instructions and red flags with patient and wife who verbalized understanding. patient/wife given an opportunity to ask questions and does not have any further questions or concerns at this time.  The patient/wife agrees to contact the PCP office for questions related to their healthcare. NN provided contact information for future reference. Disease Specific:   N/A Summary of patient's top problems: N/A Home Health orders at discharge: none Home Health company: None Date of initial visit: None Durable Medical Equipment ordered/company: N/A Durable Medical Equipment received: N/A Barriers to care? None at this time. Advance Care Planning:  
Does patient have an Advance Directive:  reviewed and current Medication(s):  
New Medications at Discharge: [address] Changed Medications at Discharge: Lotensin Discontinued Medications at Discharge: Ultram 
 
Medication reconciliation was performed with patient and wife, who verbalizes understanding of administration of home medications. There were no barriers to obtaining medications identified at this time. Referral to Pharm D needed: no  
 
Current Outpatient Medications Medication Sig  
 diazePAM (VALIUM) 5 mg tablet Take 5 mg by mouth every twelve (12) hours as needed for Anxiety.  levETIRAcetam (KEPPRA) 750 mg tablet Take 1 Tab by mouth two (2) times a day.  benazepril (LOTENSIN) 40 mg tablet Take 0.5 Tabs by mouth daily.  carvedilol (COREG) 12.5 mg tablet Take 1 Tab by mouth two (2) times a day.  amLODIPine (NORVASC) 10 mg tablet Take 1 Tab by mouth daily.  lansoprazole (PREVACID) 30 mg capsule TAKE 1 CAPSULE BY MOUTH ONCE DAILY BEFORE BREAKFAST  atorvastatin (LIPITOR) 40 mg tablet Take 1 Tab by mouth daily.  gabapentin (NEURONTIN) 600 mg tablet TAKE 1 TABLET BY MOUTH 3 TIMES A DAY  metFORMIN (GLUCOPHAGE) 1,000 mg tablet Take 1 Tab by mouth two (2) times daily (with meals).  clopidogrel (PLAVIX) 75 mg tab Take 1 Tab by mouth daily.  acetaminophen (TYLENOL) 325 mg tablet Take  by mouth every four (4) hours as needed for Pain.  aspirin 81 mg chewable tablet Take 81 mg by mouth daily.   
 triamcinolone (ARISTOCORT) 0.5 % topical cream Apply  to affected area two (2) times a day. use thin layer (Patient taking differently: Apply  to affected area two (2) times a day. use thin layer  Indications: applying to left ear)  naloxone (NARCAN) 4 mg/actuation nasal spray Use 1 spray intranasally into 1 nostril. Use a new Narcan nasal spray for subsequent doses and administer into alternating nostrils. May repeat every 2 to 3 minutes as needed. No current facility-administered medications for this visit. There are no discontinued medications. [facility] follow up appointment(s):  
Future Appointments Date Time Provider Gumaro Mcqueen 4/2/2019  2:30 PM Radha Cee MD [facility] SCHED  
8/12/2019  8:55 AM [facility] NURSE VISIT [facility] SCHED  
8/19/2019  9:20 AM Radha Cee MD [facility] SCHED  
11/7/2019 10:00 AM [facility] [place] SCHED  
11/7/2019 11:00 AM [facility] NONIMAGING 2[facility] [place] SCHED  
11/7/2019  2:15 PM MICK Sung [id number] [address] [facility] follow up appointment(s): neurology X 4 weeks; awaiting referral from PCP [facility]:  n/a  
 
 
Goals  Prevent complications post hospitalization. No admissions post 60 days from discharge of 3/27/19. Plan of Care: NN will continue to monitor and assess Ensure provider appt is scheduled within 7 days post-discharge; PCP appt scheduled Confirm patient attended post-discharge provider appt; pending Review/educate common or potential \"red flags\" of condition worsening; reviewed seizure red flags. Advised to report any new/concerning s/s Assess for health risk behaviors and educate patient/caregivers on reducing risk; will continue to assess Instruct and encourage spirometry if ordered; N/A Instruct on adherence to medications as ordered and assess for therapeutic response and side-effects; will continue to assess Monitor lab results and symptoms, escalate to provider; repeat lab work pending Ensure DME in place and used as instructed; N/A   
 Evaluate adherence to medications and priority barriers to resolve; N/A Complete post-visit call to confirm attendance and update care needs Communicate visits and goals between multiple providers and services Consult for polypharmacy (pharmacist or provider); N/A Coordinate plan to treat at home when symptoms arise; educated patient/wife about adherence to Keppra and not to drive unless cleared by neurology Discuss and provide resources for ACP; on file Wife left voicemail message wanting to know if patient needs to fast before lab work. Writer returned call and informed wife, patient may eat morning of appt. Wife voiced understanding and thanked writer for returned call.

## 2019-03-28 NOTE — Clinical Note
[facility] 3/25-3/27 new onset seizuresTOC appt 4/2 at 0 PMLotensin decreased Started on KeppraNeeds referral to neurologist that accepts HumanaNeeds repeat BMP and Mag per hospital d/c summary

## 2019-03-31 RX ORDER — CLOPIDOGREL BISULFATE 75 MG/1
TABLET ORAL
Qty: 90 TAB | Refills: 3 | Status: SHIPPED | OUTPATIENT
Start: 2019-03-31 | End: 2020-03-16

## 2019-04-02 ENCOUNTER — OFFICE VISIT (OUTPATIENT)
Dept: INTERNAL MEDICINE CLINIC | Age: 69
End: 2019-04-02

## 2019-04-02 ENCOUNTER — PATIENT OUTREACH (OUTPATIENT)
Dept: INTERNAL MEDICINE CLINIC | Age: 69
End: 2019-04-02

## 2019-04-02 VITALS
DIASTOLIC BLOOD PRESSURE: 65 MMHG | SYSTOLIC BLOOD PRESSURE: 127 MMHG | OXYGEN SATURATION: 98 % | HEART RATE: 65 BPM | HEIGHT: 70 IN | TEMPERATURE: 97.5 F | WEIGHT: 174 LBS | BODY MASS INDEX: 24.91 KG/M2 | RESPIRATION RATE: 14 BRPM

## 2019-04-02 DIAGNOSIS — R80.9 CONTROLLED TYPE 2 DIABETES MELLITUS WITH MICROALBUMINURIA, WITHOUT LONG-TERM CURRENT USE OF INSULIN (HCC): ICD-10-CM

## 2019-04-02 DIAGNOSIS — E11.29 CONTROLLED TYPE 2 DIABETES MELLITUS WITH MICROALBUMINURIA, WITHOUT LONG-TERM CURRENT USE OF INSULIN (HCC): ICD-10-CM

## 2019-04-02 DIAGNOSIS — I10 PRIMARY HYPERTENSION: Primary | ICD-10-CM

## 2019-04-02 DIAGNOSIS — R56.9 SEIZURE (HCC): ICD-10-CM

## 2019-04-02 DIAGNOSIS — I73.9 PERIPHERAL VASCULAR DISEASE (HCC): ICD-10-CM

## 2019-04-02 DIAGNOSIS — N18.31 CHRONIC KIDNEY DISEASE (CKD) STAGE G3A/A3, MODERATELY DECREASED GLOMERULAR FILTRATION RATE (GFR) BETWEEN 45-59 ML/MIN/1.73 SQUARE METER AND ALBUMINURIA CREATININE RATIO GREATER THAN 300 MG/G (HCC): ICD-10-CM

## 2019-04-02 RX ORDER — BENAZEPRIL HYDROCHLORIDE 20 MG/1
20 TABLET ORAL DAILY
Qty: 90 TAB | Refills: 3 | Status: SHIPPED | OUTPATIENT
Start: 2019-04-02 | End: 2020-03-16

## 2019-04-02 NOTE — PROGRESS NOTES
Chief Complaint Patient presents with  Transitions Of Care  
  new onset seizure 1. Have you been to the ER, urgent care clinic or hospitalized since your last visit? YES. [facility] on 3/25/19 - 3/27/19  
 
 
 
2. Have you seen or consulted any other health care providers outside of the [address] [place] [address] since your last visit (Include any pap smears or colon screening)?  NO

## 2019-04-02 NOTE — PROGRESS NOTES
Goals Addressed This Visit's Progress  Prevent complications post hospitalization. On track No admissions post 60 days from discharge of 3/27/19. Plan of Care: NN will continue to monitor and assess Ensure provider appt is scheduled within 7 days post-discharge; PCP appt scheduled Confirm patient attended post-discharge provider appt; 4/2: patient attended PCP appt as scheduled Review/educate common or potential \"red flags\" of condition worsening; reviewed seizure red flags. Advised to report any new/concerning s/s Assess for health risk behaviors and educate patient/caregivers on reducing risk; will continue to assess Instruct and encourage spirometry if ordered; N/A Instruct on adherence to medications as ordered and assess for therapeutic response and side-effects; will continue to assess Monitor lab results and symptoms, escalate to provider; repeat lab work pending Ensure DME in place and used as instructed; N/A Evaluate adherence to medications and priority barriers to resolve; N/A Complete post-visit call to confirm attendance and update care needs Communicate visits and goals between multiple providers and services Consult for polypharmacy (pharmacist or provider); N/A Coordinate plan to treat at home when symptoms arise; educated patient/wife about adherence to Keppra and not to drive unless cleared by neurology Discuss and provide resources for ACP; on file

## 2019-04-03 NOTE — PROGRESS NOTES
Patient being seen today for transitional care management Patient was admitted to [facility] from 3/25-27/19 Initial interactive contact was done by nurse navigator I thoroughly reviewed the discharge summary, notes, consults, labs and imaging studies in the electronic record. Pertinent details are summarized below and the record has been updated to reflect recent events He was admitted with syncopal episode. The wife described shaking episode and he did have incontinence. Head CT was negative, MRI brain w contrast without lesions, EEG negative. Seen by Dr Amalia Beltran and keppra started with outpt f/u. He also suggested stopping tramadol due to potential for dec seizure threshold. Carotid showed 01% MILAGROS, <05% LICA. Benazepril was dec to 20. He will need f/u with diff neurologist as Dr Amalia Beltran does not participate with his insurance No cardiovascular complaints. No polyuria, polydipsia, nocturia, vision change or neurologic complaints. Not checking sugars regularly. Weight is stable. Sees Dr. Marisel eCe Denies any claudication sx when he does walk. No gi or gu issues LAST MEDICARE WELLNESS EXAM: 7/13/16, 7/20/17, 10/3/18 Past Medical History:  
Diagnosis Date  Anemia   
 fe def w gi w/u Dr Conrad Flor  Basal cell carcinoma   
 s/p resection Dr. Alfonso Ponce  Chronic back pain inoperable Dr. Skyler Barraza and Dr. Rolo Moe  Chronic kidney disease 04/2017  
 saw Dr Diego Cheatham  Colon adenoma   
 and diverticulosis Dr Conrad Flor 2014  Diabetes mellitus (HCC)   
 microalbuminuria  DJD (degenerative joint disease)  Dyshidrotic eczema  Dyslipidemia  ED (erectile dysfunction)  FHx: heart disease  Gastritis 2014 Dr Conrad Flor neg h pylori  GERD (gastroesophageal reflux disease) 2014  
 on EGD Dr Conrad Flor  Hypertension  Left renal artery stenosis ([address] 11/30/2017  
 f/u duplex 11/18 by vasc was negative  Meniere's disease   
 s/p surgery Dr Olga Gaxiola  Overweight (BMI 25.0-29.9) 2017  PAD (peripheral artery disease) ([facility] [address] Past Surgical History:  
Procedure Laterality Date  CARDIAC SURG PROCEDURE UNLIST  10/10 NST neg EF 68%  CARDIAC SURG PROCEDURE UNLIST  2018  
 echo nl LV, ef 62%, mild mr/tr/pr, rvp 33 [facility]  
 HX CHOLECYSTECTOMY   Dr. Yessi Dale HX COLONOSCOPY    
 adenoma and divertics Dr Mary Ponce  HX GI    
 pill camera non bleeding ileal AVM  HX TONSILLECTOMY  SINUS SURGERY PROC UNLISTED    
 left ear surgery Dr Jolly Maddox for meniere's  VASCULAR SURGERY PROCEDURE UNLIST  10/10 Illiac Bypass left Graft Surgery  Dr. Abeba Valdes Social History Socioeconomic History  Marital status:  Spouse name: Not on file  Number of children: 0  
 Years of education: Not on file  Highest education level: Not on file Occupational History  Occupation: [address] Social Needs  Financial resource strain: Not on file  Food insecurity:  
  Worry: Not on file Inability: Not on file  Transportation needs:  
  Medical: Not on file Non-medical: Not on file Tobacco Use  Smoking status: Former Smoker Packs/day: 1.00 Last attempt to quit: 10/22/2011 Years since quittin.4  Smokeless tobacco: Never Used Substance and Sexual Activity  Alcohol use: No  
 Drug use: No  
 Sexual activity: Not on file Lifestyle  Physical activity:  
  Days per week: Not on file Minutes per session: Not on file  Stress: Not on file Relationships  Social connections:  
  Talks on phone: Not on file Gets together: Not on file Attends Christianity service: Not on file Active member of club or organization: Not on file Attends meetings of clubs or organizations: Not on file Relationship status: Not on file  Intimate partner violence:  
  Fear of current or ex partner: Not on file Emotionally abused: Not on file Physically abused: Not on file Forced sexual activity: Not on file Other Topics Concern  Not on file Social History Narrative  Not on file Current Outpatient Medications Medication Sig  clopidogrel (PLAVIX) 75 mg tab TAKE 1 TABLET BY MOUTH ONCE DAILY  diazePAM (VALIUM) 5 mg tablet Take 5 mg by mouth every twelve (12) hours as needed for Anxiety.  levETIRAcetam (KEPPRA) 750 mg tablet Take 1 Tab by mouth two (2) times a day.  benazepril (LOTENSIN) 40 mg tablet Take 0.5 Tabs by mouth daily.  carvedilol (COREG) 12.5 mg tablet Take 1 Tab by mouth two (2) times a day.  amLODIPine (NORVASC) 10 mg tablet Take 1 Tab by mouth daily.  lansoprazole (PREVACID) 30 mg capsule TAKE 1 CAPSULE BY MOUTH ONCE DAILY BEFORE BREAKFAST  atorvastatin (LIPITOR) 40 mg tablet Take 1 Tab by mouth daily.  gabapentin (NEURONTIN) 600 mg tablet TAKE 1 TABLET BY MOUTH 3 TIMES A DAY  metFORMIN (GLUCOPHAGE) 1,000 mg tablet Take 1 Tab by mouth two (2) times daily (with meals).  naloxone (NARCAN) 4 mg/actuation nasal spray Use 1 spray intranasally into 1 nostril. Use a new Narcan nasal spray for subsequent doses and administer into alternating nostrils. May repeat every 2 to 3 minutes as needed.  acetaminophen (TYLENOL) 325 mg tablet Take  by mouth every four (4) hours as needed for Pain.  aspirin 81 mg chewable tablet Take 81 mg by mouth daily.  triamcinolone (ARISTOCORT) 0.5 % topical cream Apply  to affected area two (2) times a day. use thin layer (Patient taking differently: Apply  to affected area two (2) times a day. use thin layer  Indications: applying to left ear) No current facility-administered medications for this visit. No Known Allergies REVIEW OF SYSTEMS: sees Dr. Abi Grayson, no podiatry, colo 2014 Dr Tia Simms Ophtho  no vision change or eye pain Oral  no mouth pain, tongue or tooth problems Ears  no hearing loss, ear pain, fullness, no swallowing problems Cardiac  no CP, PND, orthopnea, edema, palpitations or syncope Chest  no breast masses Resp  no wheezing, chronic coughing, dyspnea GI  no heartburn, nausea, vomiting, change in bowel habits, bleeding, hemorrhoids Urinary  no dysuria, hematuria, flank pain, urgency, frequency Endo - no polyuria, polydipsia, nocturia, hot flashes Visit Vitals /65 Pulse 65 Temp 97.5 °F (36.4 °C) (Oral) Resp 14 Ht 5' 10\" (1.778 m) Wt 174 lb (78.9 kg) SpO2 98% BMI 24.97 kg/m² A&O x3 Affect is appropriate. Mood stable No apparent distress Anicteric, no JVD, adenopathy or thyromegaly. No carotid bruits or radiated murmur Lungs clear to auscultation, no wheezes or rales Heart showed regular rate and rhythm. No murmur, rubs, gallops Abdomen soft nontender, no hepatosplenomegaly or masses. Ext without c/c/e, 1+m pulses dp/pt LABS From 10/11 showed gluc 99,  cr 0.98, gfr 83, alt 43,  hba1c 5.6, ldl-p 1018, chol 124, tg 223, hdl 37, ldl-c 42, umar 11.2, tsh 2.10,          psa 0.90 From 4/12 showed                  hba1c 5.8, ldl-p 1136, chol 143, tg 188, hdl 38, ldl-c 67, umar 9.5 From 10/12 showed gluc 87,  cr 0.99, gfr 81, alt 15,  hba1c 5.6, ldl-p 500,   chol 109, tg 125, hdl 40, ldl-c 44 From 7/13 showed                  hba1c 5.7,                   chol 136, tg 137, hdl 42, ldl-c 67 From 2/14 showed                  hba1c 6.0,               chol 136, tg 72,   hdl 52, ldl-c 72, umar 347,  wbc 12.4, hb 12.0, plt 240, psa 1.39 From 5/14 showed                  hba1c 6.0,               chol 135, tg 94,   hdl 45, ldl-c 71, umar 469,  wbc 9.5,   hb 11.6, plt 232, psa 1.35 From 5/14 showed                                      fe 25, %sat 7, ferritin 32, b12 699, fol 16.1, spep neg From 6/14 showed   gluc 100, cr 1.33, gfr 54 From 8/14 showed                  hba1c 6.1,               chol 137, tg 93,   hdl 46, ldl-c 98, umar 100 From 2/15 showed   gluc 100, cr 1.50, gfr 46, alt 14, hba1c 5.9,             umar 82.1,  wbc 9.9,   hb 11.2, plt 203 From 9/15 showed   gluc 88,   cr 1.30,    alt 11, hba1c 5.6,    chol 135, tg 123, hdl 43, ldl-c 67, umar 159,   wbc 9.2,   hb 11.8, plt 225 From 3/16 showed   gluc 77,   cr 1.58, gfr 45,   hba1c 6.0,                    hep c neg, na 133 From 7/16 showed   gluc 96,   cr 1.41, gfr 52,   hba1c 5.9,             umar 163,   wbc 9.6,  hb 12.1, plt 281,                    na 131 From 1/17 showed   gluc 79,   cr 1.50, gfr 48 From 7/17 showed   gluc 92,   cr 1.57, gfr 45, alt 6,   hba1c 5.8,   chol 170, tg 135, hdl 44, ldl-c 99, umar 1043 From 11/17 showed      hba1c 5.7,   chol 156, tg 125, hdl 51, ldl-c 80, umar 779,   wbc 10.1, hb 12.0, plt 264 From 2/18 showed   gluc 83,   cr 1.40, gfr 54,                       wbc 6.2,   hb 9.8,   plt 216, fe 66 ferritin 58.6, b12 376 From 3/18 showed   gluc 82,   cr 1.69, gfr 41,   hba1c 5.7,            umar 275 From 10/18 showed gluc 86,   cr 1.51, gfr47,   hba1c 5.7,             chol 149, tg 150, hdl 33, ldl-c 86 From 1/19 showed   gluc 80,   cr 1.43, gfr 50, alt 11, hba1c 5.8,     chol 139, tg 146, hdl 37, ld-c 73,    wbc 11.6, hb 11.3, plt 303, k 5.6 From 3/19 showed   gluc 127, cr 1.40, gfr 54,            wbc 7.3,   hb 9.4, plt 222 Patient Active Problem List  
Diagnosis Code  Hyperlipidemia E78.5  Chronic back pain inoperable Dr. Ronnie Rudolph and Dr. Waldemar Adler M54.9, O01.89  
 Peripheral vascular disease s/p left iliac bpg Dr. Amalia Delvalle 10/10 I73.9  Erectile dysfunction N52.9  Arthritis, degenerative M19.90  
 Primary hypertension I10  
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 9) E66.3  New onset seizure ([facility] [address] R56.9 Assessment and plan: 1. Ménière's. Followup Dr. Hassel Collet prn 2. Diabetes w microalbuminuria. Well-controlled on metformin alone, close watch on renal fxn 3. Hyperlipidemia. Continue current regimen. 4. Chronic back problems. Continue current. Stopped the tramadol, he will just use tylenol, not interested in narcs 5. Vascular. F/U Dr. Petty Brunner as directed 6. CKD. F/U Dr Brennon Jaimes 7. HTN. Continue current regimen. 8. Overweight. Lifestyle and dietary measures, portion control 9. Possible seizures? Referral to Dr Adriana Moseley' group RTC 6/19 Above conditions discussed at length and patient vocalized understanding. All questions answered to patient satisfaction

## 2019-04-05 ENCOUNTER — TELEPHONE (OUTPATIENT)
Dept: INTERNAL MEDICINE CLINIC | Age: 69
End: 2019-04-05

## 2019-04-05 DIAGNOSIS — R56.9 SEIZURE (HCC): Primary | ICD-10-CM

## 2019-04-05 NOTE — TELEPHONE ENCOUNTER
FYI-Wife called- appt Dr Yoanna Martinez on 04/30- neurologist for seizures-  They are understaffed and not doing there job per the staff at Dr Yoanna Martinez office

## 2019-04-09 ENCOUNTER — TELEPHONE (OUTPATIENT)
Dept: INTERNAL MEDICINE CLINIC | Age: 69
End: 2019-04-09

## 2019-04-09 DIAGNOSIS — M54.9 BACK PAIN, UNSPECIFIED BACK LOCATION, UNSPECIFIED BACK PAIN LATERALITY, UNSPECIFIED CHRONICITY: ICD-10-CM

## 2019-04-09 DIAGNOSIS — R56.9 NEW ONSET SEIZURE (HCC): Primary | ICD-10-CM

## 2019-04-09 NOTE — TELEPHONE ENCOUNTER
Wife called- PT has [employer]- needs referral to Dr Quintero Bound- had appt today-  For seizures- this was a 1 time referralper his office- he needs another referral for May 15 for f/u appt

## 2019-04-10 ENCOUNTER — PATIENT OUTREACH (OUTPATIENT)
Dept: INTERNAL MEDICINE CLINIC | Age: 69
End: 2019-04-10

## 2019-04-10 ENCOUNTER — TELEPHONE (OUTPATIENT)
Dept: INTERNAL MEDICINE CLINIC | Age: 69
End: 2019-04-10

## 2019-04-10 NOTE — TELEPHONE ENCOUNTER
Wife calling says Dr. [facility] [employer] office is saying the referral we send them only says one visit. I explained to wife that [facility] no longer requires insurance referrals. She says the doctors office is calling her so Ildefonso Akbar may need to call them to see what they are needing.

## 2019-04-10 NOTE — TELEPHONE ENCOUNTER
pts wife is calling to get a referral to see Matheus Buchanan for a test 05/23 at 9am    Please call pt there is some confusion

## 2019-04-10 NOTE — TELEPHONE ENCOUNTER
I informed wife [facility] no longer needs referral. I did advise that when Dr. Lamont Moseley puts in the referral for Dr. Gen Marquez the nurse would send the referral along with RD note to inform Dr. Gen Marquez what is going on with patient.

## 2019-04-10 NOTE — PROGRESS NOTES
Complex Case Management  Follow-Up Date/Time:  4/10/2019 3:31 PM 
  
NN contacted patient for Complex Case Management  follow up. Spoke to Latrell Moreira, wife (listed on PHI). Introduced self/role and reason for call. Wife reported:  
Doing alright Denied seizure like activity Was instructed by Dr. Carrie Clark (neurology) to stop taking Tramadol Medications:  
New medications since last outreach: no 
Does patient need refills on any medications: no 
Medication changes since last outreach (dose adjustments or discontinued meds): discontinued Tramadol Home Health company: N/A Date of discharge: N/A Barriers to care? None at this time. Specialist appointments since last outreach? yes If so, specialist and date: Dr. Carrie Clark ,neurology 4/9/19 Wife reported appt with Dr. Carrie Clark went well. Was instructed to stop Tramadol. Wife verbalized Dr. Carrie Clark plans to repeat MRI and also order some other testing. Wife was very pleased by doctor. Reviewed s/s to monitor for: LOC, blank stare with inability to get patient's attention, jerky movements of the body, falls. Wife voiced understanding. Wife reminded that there are physicians on call 24 hours a day / 7 days a week (M-F 5pm to 8am and from Friday 5pm until Monday 8a for the weekend) should the patient have questions or concerns. Future Appointments Date Time Provider Gumaro Velozi 5/23/2019  9:00 AM Lois Tam  [address]  
8/12/2019  8:55 AM [facility] NURSE VISIT [facility] SONIA SCHED  
8/19/2019  9:20 AM Alexis Donohue MD [facility] S[place] SCHED  
11/7/2019 10:00 AM [facility] 2[facility] [place] SCHED  
11/7/2019 11:00 AM [facility] NONIMAGING 2VV [place] SCHED  
11/7/2019  2:15 PM MICK Mallory [address] [address] Other upcoming appointments: 
Dr. Carrie Clark 5/15/19 Goals  Prevent complications post hospitalization. No admissions post 60 days from discharge of 3/27/19. Plan of Care: NN will continue to monitor and assess Ensure provider appt is scheduled within 7 days post-discharge; PCP appt scheduled Confirm patient attended post-discharge provider appt; 4/2: patient attended PCP appt as scheduled Review/educate common or potential \"red flags\" of condition worsening; reviewed seizure red flags. Advised to report any new/concerning s/s Assess for health risk behaviors and educate patient/caregivers on reducing risk; will continue to assess Instruct and encourage spirometry if ordered; N/A Instruct on adherence to medications as ordered and assess for therapeutic response and side-effects; will continue to assess Monitor lab results and symptoms, escalate to provider; repeat lab work pending Ensure DME in place and used as instructed; N/A Evaluate adherence to medications and priority barriers to resolve; N/A Complete post-visit call to confirm attendance and update care needs Communicate visits and goals between multiple providers and services Consult for polypharmacy (pharmacist or provider); N/A Coordinate plan to treat at home when symptoms arise; educated patient/wife about adherence to Keppra and not to drive unless cleared by neurology Discuss and provide resources for ACP; on file

## 2019-04-11 NOTE — TELEPHONE ENCOUNTER
Called and spoke to Timmy at Dr. Mignon Chavez office to get more information about the message below. Was advised that they were calling because the patient is being referral from their office to a Neuropsychologist Dr. Kenny Sanchez at Fax: [phone]. Timmy stated that we need to send a referral their for the patient. Dr. Kenny Sanchez is Orthopedics and spine with [facility] so called back to Dr. Mignon Chavez office to get more information before sending this referral and she stated that Dr. Allen Proper corrected and the he will be seeing the patient for the Neuro aspect and he needs to see Dr. Kenny Sanchez for his spine. Placed a corrected referral. Called and spoke to Mrs. Johnson to advise the information and she kept stating that their office advised her that they need a referral for every visit. Advised patient that is incorrect and that I even clarified that information with Timmy at their office. Advised patient that if she needed more clarification that she can give their office a call and speak to Timmy. Patient understood.

## 2019-04-18 ENCOUNTER — TELEPHONE (OUTPATIENT)
Dept: INTERNAL MEDICINE CLINIC | Age: 69
End: 2019-04-18

## 2019-05-07 ENCOUNTER — PATIENT OUTREACH (OUTPATIENT)
Dept: FAMILY MEDICINE CLINIC | Age: 69
End: 2019-05-07

## 2019-05-13 ENCOUNTER — TELEPHONE (OUTPATIENT)
Dept: INTERNAL MEDICINE CLINIC | Age: 69
End: 2019-05-13

## 2019-05-13 NOTE — TELEPHONE ENCOUNTER
Wife calling asking to speak to a nurse. Says she needs to discuss appointments he is supposed to have with specialists. Tried to get more info but she says she just wants to discuss with a nurse.

## 2019-05-14 ENCOUNTER — PATIENT OUTREACH (OUTPATIENT)
Dept: INTERNAL MEDICINE CLINIC | Age: 69
End: 2019-05-14

## 2019-05-14 NOTE — PROGRESS NOTES
Complex Case Management  Follow-Up Date/Time:  5/14/2019 9:05 AM 
  
NN contacted patient for Complex Case Management  follow up. Spoke to Latrell Moreira, wife (listed on PHI). Introduced self/role and reason for call. Patient also participated in the call as well. Patient/wife reported: No seizure activity since hospitalization Taking all medications as prescribed Neck and back hurts but not all the time Does not want to go to any other specialists or have any other testing Medications:  
New medications since last outreach: no 
Does patient need refills on any medications: no 
Medication changes since last outreach (dose adjustments or discontinued meds): no 
 
Home Health company: N/A Date of discharge: N/A Barriers to care? None at this time. Specialist appointments since last outreach? no 
If so, specialist and date: N/A Advised wife/patient to contact Dr. Joseph Marques office to cancel appt. No future appt scheduled with Dr. Siobhan Glynn. Patient/wife reminded that there are physicians on call 24 hours a day / 7 days a week (M-F 5pm to 8am and from Friday 5pm until Monday 8a for the weekend) should the patient have questions or concerns. Future Appointments Date Time Provider Gumaro Mcqueen 5/23/2019  9:00 AM Lois Tam  [address]  
8/12/2019  8:55 AM [facility] NURSE VISIT [facility] SCHED  
8/19/2019  9:20 AM Alexis Donohue MD [facility] SCHED  
11/7/2019 10:00 AM [facility] 1 [facility] [place] SCHED  
11/7/2019 11:00 AM [facility] NONIMAGING 2[facility] [place] SCHED  
11/7/2019  2:15 PM MICK Mallory [address] [address] Other upcoming appointments: N/A Goals  Prevent complications post hospitalization. No admissions post 60 days from discharge of 3/27/19. Plan of Care: NN will continue to monitor and assess Ensure provider appt is scheduled within 7 days post-discharge; PCP appt scheduled Confirm patient attended post-discharge provider appt; 4/2: patient attended PCP appt as scheduled Review/educate common or potential \"red flags\" of condition worsening; reviewed seizure red flags. Advised to report any new/concerning s/s. 5/14: Denied any seizure like activity. Assess for health risk behaviors and educate patient/caregivers on reducing risk; will continue to assess Instruct and encourage spirometry if ordered; N/A Instruct on adherence to medications as ordered and assess for therapeutic response and side-effects; will continue to assess Monitor lab results and symptoms, escalate to provider; repeat lab work pending Ensure DME in place and used as instructed; N/A Evaluate adherence to medications and priority barriers to resolve; N/A Consult for polypharmacy (pharmacist or provider); N/A Coordinate plan to treat at home when symptoms arise; educated patient/wife about adherence to Keppra and not to drive unless cleared by neurology Discuss and provide resources for ACP; on file

## 2019-05-16 DIAGNOSIS — F43.9 STRESS: ICD-10-CM

## 2019-05-17 ENCOUNTER — TELEPHONE (OUTPATIENT)
Dept: INTERNAL MEDICINE CLINIC | Age: 69
End: 2019-05-17

## 2019-05-17 RX ORDER — DIAZEPAM 5 MG/1
TABLET ORAL
Qty: 60 TAB | Refills: 0 | Status: SHIPPED | OUTPATIENT
Start: 2019-05-17 | End: 2019-10-04 | Stop reason: SDUPTHER

## 2019-05-28 ENCOUNTER — PATIENT OUTREACH (OUTPATIENT)
Dept: INTERNAL MEDICINE CLINIC | Age: 69
End: 2019-05-28

## 2019-05-28 NOTE — PROGRESS NOTES
Patient has graduated from the Complex Case Management  program on 5/28/19. Patient's symptoms are stable at this time. Patient/family has the ability to self-manage. Care management goals have been completed at this time. No further nurse navigator follow up scheduled. Goals Addressed                 This Visit's Progress     COMPLETED: Prevent complications post hospitalization. On track     No admissions post 60 days from discharge of 3/27/19. Plan of Care:  NN will continue to monitor and assess  Ensure provider appt is scheduled within 7 days post-discharge; PCP appt scheduled  Confirm patient attended post-discharge provider appt; 4/2: patient attended PCP appt as scheduled  Review/educate common or potential \"red flags\" of condition worsening; reviewed seizure red flags. Advised to report any new/concerning s/s. 5/14: Denied any seizure like activity. Assess for health risk behaviors and educate patient/caregivers on reducing risk; will continue to assess   Instruct and encourage spirometry if ordered; N/A     Instruct on adherence to medications as ordered and assess for therapeutic response and side-effects; will continue to assess      Monitor lab results and symptoms, escalate to provider; repeat lab work pending   Ensure DME in place and used as instructed; N/A    Evaluate adherence to medications and priority barriers to resolve; N/A   Consult for polypharmacy (pharmacist or provider); N/A   Coordinate plan to treat at home when symptoms arise; educated patient/wife about adherence to [address] and not to drive unless cleared by neurology   Discuss and provide resources for ACP; on file                      Pt has nurse navigator's contact information for any further questions, concerns, or needs.   Patients upcoming visits:    Future Appointments   Date Time Provider Gumaro Mcqueen   8/12/2019  8:55 AM [facility] NURSE VISIT [facility] [place]   8/19/2019  9:20 AM Zenia Vides MD [facility] SCHED   11/7/2019 10:00 AM [facility] [place] SCHED   11/7/2019 11:00 AM [facility]AG[facility] [place] SCHED   11/7/2019  2:15 PM MICK Gillis [address] [address]

## 2019-05-29 RX ORDER — LEVETIRACETAM 750 MG/1
750 TABLET ORAL 2 TIMES DAILY
Qty: 180 TAB | Refills: 3 | Status: SHIPPED | OUTPATIENT
Start: 2019-05-29 | End: 2020-01-01

## 2019-06-28 DIAGNOSIS — E78.00 PURE HYPERCHOLESTEROLEMIA: ICD-10-CM

## 2019-06-28 RX ORDER — METFORMIN HYDROCHLORIDE 1000 MG/1
1000 TABLET ORAL 2 TIMES DAILY WITH MEALS
Qty: 180 TAB | Refills: 3 | Status: SHIPPED | OUTPATIENT
Start: 2019-06-28 | End: 2020-01-01

## 2019-08-12 ENCOUNTER — APPOINTMENT (OUTPATIENT)
Dept: INTERNAL MEDICINE CLINIC | Age: 69
End: 2019-08-12

## 2019-08-12 DIAGNOSIS — E11.29 CONTROLLED TYPE 2 DIABETES MELLITUS WITH MICROALBUMINURIA, WITHOUT LONG-TERM CURRENT USE OF INSULIN (HCC): ICD-10-CM

## 2019-08-12 DIAGNOSIS — R80.9 CONTROLLED TYPE 2 DIABETES MELLITUS WITH MICROALBUMINURIA, WITHOUT LONG-TERM CURRENT USE OF INSULIN (HCC): ICD-10-CM

## 2019-08-13 LAB
ALBUMIN/CREAT UR: 759.8 MG/G CREAT (ref 0–30)
BUN SERPL-MCNC: 16 MG/DL (ref 8–27)
BUN/CREAT SERPL: 11 (ref 10–24)
CALCIUM SERPL-MCNC: 9.1 MG/DL (ref 8.6–10.2)
CHLORIDE SERPL-SCNC: 108 MMOL/L (ref 96–106)
CHOLEST SERPL-MCNC: 118 MG/DL (ref 100–199)
CO2 SERPL-SCNC: 21 MMOL/L (ref 20–29)
CREAT SERPL-MCNC: 1.48 MG/DL (ref 0.76–1.27)
CREAT UR-MCNC: 104.3 MG/DL
GLUCOSE SERPL-MCNC: 88 MG/DL (ref 65–99)
HBA1C MFR BLD: 5.9 % (ref 4.8–5.6)
HDLC SERPL-MCNC: 30 MG/DL
INTERPRETATION, 910389: NORMAL
INTERPRETATION: NORMAL
LDLC SERPL CALC-MCNC: 59 MG/DL (ref 0–99)
Lab: NORMAL
MICROALBUMIN UR-MCNC: 792.5 UG/ML
PDF IMAGE, 910387: NORMAL
POTASSIUM SERPL-SCNC: 6.3 MMOL/L (ref 3.5–5.2)
SODIUM SERPL-SCNC: 142 MMOL/L (ref 134–144)
SPECIMEN STATUS REPORT, ROLRST: NORMAL
TRIGL SERPL-MCNC: 147 MG/DL (ref 0–149)
VLDLC SERPL CALC-MCNC: 29 MG/DL (ref 5–40)

## 2019-08-14 ENCOUNTER — TELEPHONE (OUTPATIENT)
Dept: INTERNAL MEDICINE CLINIC | Age: 69
End: 2019-08-14

## 2019-08-14 DIAGNOSIS — E87.5 HYPERKALEMIA: Primary | ICD-10-CM

## 2019-08-14 RX ORDER — SODIUM POLYSTYRENE SULFONATE 15 G/60ML
15 SUSPENSION ORAL; RECTAL
Qty: 60 ML | Refills: 1 | Status: SHIPPED | OUTPATIENT
Start: 2019-08-14 | End: 2019-08-14

## 2019-08-14 NOTE — TELEPHONE ENCOUNTER
pls call    k 6.3 very high    Kayexalate called in - take 2 doses 12 hours apart  Recheck bmp tomorrow    F/u as scheduled

## 2019-08-15 ENCOUNTER — APPOINTMENT (OUTPATIENT)
Dept: GENERAL RADIOLOGY | Age: 69
End: 2019-08-15
Attending: PHYSICIAN ASSISTANT
Payer: MEDICARE

## 2019-08-15 ENCOUNTER — HOSPITAL ENCOUNTER (EMERGENCY)
Age: 69
Discharge: HOME | End: 2019-08-15
Attending: EMERGENCY MEDICINE
Payer: MEDICARE

## 2019-08-15 VITALS
HEIGHT: 71 IN | DIASTOLIC BLOOD PRESSURE: 65 MMHG | RESPIRATION RATE: 11 BRPM | SYSTOLIC BLOOD PRESSURE: 142 MMHG | TEMPERATURE: 98.1 F | BODY MASS INDEX: 24.36 KG/M2 | WEIGHT: 174 LBS | OXYGEN SATURATION: 98 % | HEART RATE: 68 BPM

## 2019-08-15 DIAGNOSIS — E87.5 HYPERKALEMIA: ICD-10-CM

## 2019-08-15 DIAGNOSIS — R89.9 ABNORMAL LABORATORY TEST RESULT: Primary | ICD-10-CM

## 2019-08-15 LAB
ALBUMIN SERPL-MCNC: 3.1 G/DL (ref 3.4–5)
ALBUMIN/GLOB SERPL: 0.8 {RATIO} (ref 0.8–1.7)
ALP SERPL-CCNC: 100 U/L (ref 45–117)
ALT SERPL-CCNC: 14 U/L (ref 16–61)
ANION GAP SERPL CALC-SCNC: 5 MMOL/L (ref 3–18)
AST SERPL-CCNC: 7 U/L (ref 10–38)
BASOPHILS # BLD: 0 K/UL (ref 0–0.1)
BASOPHILS NFR BLD: 0 % (ref 0–2)
BILIRUB SERPL-MCNC: 0.2 MG/DL (ref 0.2–1)
BUN SERPL-MCNC: 18 MG/DL (ref 7–18)
BUN/CREAT SERPL: 10 (ref 12–20)
CALCIUM SERPL-MCNC: 8.1 MG/DL (ref 8.5–10.1)
CHLORIDE SERPL-SCNC: 110 MMOL/L (ref 100–111)
CO2 SERPL-SCNC: 24 MMOL/L (ref 21–32)
CREAT SERPL-MCNC: 1.74 MG/DL (ref 0.6–1.3)
DIFFERENTIAL METHOD BLD: ABNORMAL
EOSINOPHIL # BLD: 0.2 K/UL (ref 0–0.4)
EOSINOPHIL NFR BLD: 3 % (ref 0–5)
ERYTHROCYTE [DISTWIDTH] IN BLOOD BY AUTOMATED COUNT: 15.1 % (ref 11.6–14.5)
GLOBULIN SER CALC-MCNC: 3.8 G/DL (ref 2–4)
GLUCOSE SERPL-MCNC: 155 MG/DL (ref 74–99)
HCT VFR BLD AUTO: 31.4 % (ref 36–48)
HGB BLD-MCNC: 10.1 G/DL (ref 13–16)
LYMPHOCYTES # BLD: 2 K/UL (ref 0.9–3.6)
LYMPHOCYTES NFR BLD: 28 % (ref 21–52)
MCH RBC QN AUTO: 28.2 PG (ref 24–34)
MCHC RBC AUTO-ENTMCNC: 32.2 G/DL (ref 31–37)
MCV RBC AUTO: 87.7 FL (ref 74–97)
MONOCYTES # BLD: 0.6 K/UL (ref 0.05–1.2)
MONOCYTES NFR BLD: 8 % (ref 3–10)
NEUTS SEG # BLD: 4.4 K/UL (ref 1.8–8)
NEUTS SEG NFR BLD: 61 % (ref 40–73)
PLATELET # BLD AUTO: 230 K/UL (ref 135–420)
PMV BLD AUTO: 10.1 FL (ref 9.2–11.8)
POTASSIUM SERPL-SCNC: 4.5 MMOL/L (ref 3.5–5.5)
PROT SERPL-MCNC: 6.9 G/DL (ref 6.4–8.2)
RBC # BLD AUTO: 3.58 M/UL (ref 4.7–5.5)
SODIUM SERPL-SCNC: 139 MMOL/L (ref 136–145)
WBC # BLD AUTO: 7.2 K/UL (ref 4.6–13.2)

## 2019-08-15 PROCEDURE — 85025 COMPLETE CBC W/AUTO DIFF WBC: CPT

## 2019-08-15 PROCEDURE — 99284 EMERGENCY DEPT VISIT MOD MDM: CPT

## 2019-08-15 PROCEDURE — 71046 X-RAY EXAM CHEST 2 VIEWS: CPT

## 2019-08-15 PROCEDURE — 93005 ELECTROCARDIOGRAM TRACING: CPT

## 2019-08-15 PROCEDURE — 80053 COMPREHEN METABOLIC PANEL: CPT

## 2019-08-15 RX ORDER — SODIUM POLYSTYRENE SULFONATE 15 G/60ML
15 SUSPENSION ORAL; RECTAL
Qty: 60 ML | Refills: 1 | Status: CANCELLED | OUTPATIENT
Start: 2019-08-15 | End: 2019-08-15

## 2019-08-15 NOTE — TELEPHONE ENCOUNTER
Wife calling asking if RD still wants labs tomorrow if the patient cant get the medication. Please call asap she is very upset that no one has returned her call.

## 2019-08-15 NOTE — ED PROVIDER NOTES
EMERGENCY DEPARTMENT HISTORY AND PHYSICAL EXAM    Date: 8/15/2019  Patient Name: Raphael Connelly    History of Presenting Illness     Chief Complaint   Patient presents with    Abnormal Lab Results         History Provided By: Patient        Additional History (Context): Raphael Connelly is a 71 y.o. male with hypertension and hyperlipidemia sent by his primary care provider to the ER for evaluation of elevated potassium levels based upon lab results that were done Monday. Patient denies chest pain, shortness of breath, dyspnea or syncope. Patient patient has no complaints here today. PCP: Fredy Lazo MD    Current Outpatient Medications   Medication Sig Dispense Refill    metFORMIN (GLUCOPHAGE) 1,000 mg tablet Take 1 Tab by mouth two (2) times daily (with meals). 180 Tab 3    levETIRAcetam (KEPPRA) 750 mg tablet Take 1 Tab by mouth two (2) times a day. 180 Tab 3    diazePAM (VALIUM) 5 mg tablet TAKE 1 TABLET BY MOUTH 2 TIMES A DAY *NOT TO EXCEED 2 TABLETS PER DAY* 60 Tab 0    gabapentin (NEURONTIN) 600 mg tablet Take 1 Tab by mouth three (3) times daily. 270 Tab 3    benazepril (LOTENSIN) 20 mg tablet Take 1 Tab by mouth daily. 90 Tab 3    clopidogrel (PLAVIX) 75 mg tab TAKE 1 TABLET BY MOUTH ONCE DAILY 90 Tab 3    carvedilol (COREG) 12.5 mg tablet Take 1 Tab by mouth two (2) times a day. 180 Tab 3    amLODIPine (NORVASC) 10 mg tablet Take 1 Tab by mouth daily. 90 Tab 3    lansoprazole (PREVACID) 30 mg capsule TAKE 1 CAPSULE BY MOUTH ONCE DAILY BEFORE BREAKFAST 90 Cap 3    atorvastatin (LIPITOR) 40 mg tablet Take 1 Tab by mouth daily. 30 Tab 11    naloxone (NARCAN) 4 mg/actuation nasal spray Use 1 spray intranasally into 1 nostril. Use a new Narcan nasal spray for subsequent doses and administer into alternating nostrils. May repeat every 2 to 3 minutes as needed. 1 Each 1    acetaminophen (TYLENOL) 325 mg tablet Take  by mouth every four (4) hours as needed for Pain.       aspirin 81 mg chewable tablet Take 81 mg by mouth daily.  triamcinolone (ARISTOCORT) 0.5 % topical cream Apply  to affected area two (2) times a day. use thin layer (Patient taking differently: Apply  to affected area two (2) times a day.  use thin layer  Indications: applying to left ear) 60 g 3    patiromer calcium sorbitex (VELTASSA) 16.8 gram powder 1 dose now and another one 12 hours later 2 Packet 1       Past History     Past Medical History:  Past Medical History:   Diagnosis Date    Anemia     fe def w gi w/u Dr Dinora Barraza cell carcinoma     s/p resection Dr. Jamison Sun Chronic back pain inoperable Dr. Maude Jesus and Dr. Kwaku Steele Chronic kidney disease 04/2017    saw Dr Jazmine Lisa    Colon adenoma     and diverticulosis Dr Jazz Suresh 2014    Diabetes mellitus ([address]     microalbuminuria    DJD (degenerative joint disease)     Dyshidrotic eczema     Dyslipidemia     ED (erectile dysfunction)     FHx: heart disease     Gastritis     2014 Dr Jazz Suresh neg h pylori    GERD (gastroesophageal reflux disease) 2014    on EGD Dr Jazz Suresh    Hypertension     Left renal artery stenosis ([address] 11/30/2017    f/u duplex 11/18 by vasc was negative    Meniere's disease     s/p surgery Dr Gloria Muhammad    Overweight (BMI 25.0-29.9) 11/30/2017    P[facility]per[facility]) [facility]     Seizure ([facility] 03/2019    possible Dr Esthela Jamison at [facility]; mri neg, EEG neg, on keppra    Syncope 02/2018    probably vasovagal [facility]       Past Surgical History:  Past Surgical History:   Procedure Laterality Date    CARDIAC SURG PROCEDURE UNLIST  10/10    NST neg EF 68%    CARDIAC SURG PROCEDURE UNLIST  02/2018    echo nl LV, ef 62%, mild mr/tr/pr, rvp [facility]    HX CHOLECYSTECTOMY  2011    Dr. Juan Boudreaux HX COLONOSCOPY  2014    adenoma and divertics Dr Jazz Suresh    HX GI  2014    pill camera non bleeding ileal AVM    HX TONSILLECTOMY      NEUROLOGICAL PROCEDURE UNLISTED  03/2019    mri head contrast showed no acute lesions    SINUS SURGERY PROC UNLISTED      left ear surgery Dr Cortez Officer for meniere's    VASCULAR SURGERY PROCEDURE UNLIST  10/10    [facility] left Graft Surgery  Dr. Armani Tejada  2019    carotids showed 16%XXNE, <50%LICA       Family History:  Family History   Problem Relation Age of Onset    Heart Disease Mother     Stroke Father        Social History:  Social History     Tobacco Use    Smoking status: Former Smoker     Packs/day: 1.00     Last attempt to quit: 10/22/2011     Years since quittin.8    Smokeless tobacco: Never Used   Substance Use Topics    Alcohol use: No    Drug use: No       Allergies:  No Known Allergies      Review of Systems   Review of Systems  Review of Systems   Constitutional: Negative for fatigue and fever. HENT: Negative for congestion. Respiratory: Negative for cough and shortness of breath. Cardiovascular: Negative for chest pain. Gastrointestinal: Negative for abdominal pain, diarrhea, nausea and vomiting. Genitourinary: Negative for difficulty urinating and dysuria. Musculoskeletal: Negative joint pain, joint swelling, recent injury. Skin: Negative for wound. Neurological: Negative for dizziness and headaches. All other systems reviewed and are negative. All Other Systems Negative  Physical Exam     Vitals:    08/15/19 1742   BP: 154/63   Pulse: 73   Resp: 16   Temp: 98.1 °F (36.7 °C)   SpO2: 98%   Weight: 78.9 kg (174 lb)   Height: 5' 11\" (1.803 m)     Physical Exam     Constitutional: Pt is oriented to person, place, and time. Pt appears well-developed and well-nourished. HENT:   Head: Normocephalic and atraumatic. Mouth/Throat: Oropharynx is clear and moist.   Eyes: Pupils are equal, round, and reactive to light. Neck: Normal range of motion. Neck supple. Cardiovascular: Normal rate, regular rhythm and normal heart sounds. No murmur heard. Pulmonary/Chest: Effort normal and breath sounds normal. No respiratory distress. Expiratory wheezes are auscultated in the right upper lobe; no rales. Abdominal: Soft. no distension and no mass. There is no tenderness. There is no rebound and no guarding. Musculoskeletal: Normal range of motion. No edema or deformity. Neurological: Pt is alert and oriented to person, place, and time   Skin: Skin is warm and dry. No diaphoreses  Psychiatric: Pt has a normal mood and affect;  behavior is normal. Judgment and thought content normal.           Diagnostic Study Results     Labs -     Recent Results (from the past 12 hour(s))   EKG, 12 LEAD, INITIAL    Collection Time: 08/15/19  5:46 PM   Result Value Ref Range    Ventricular Rate 72 BPM    Atrial Rate 72 BPM    P-R Interval 136 ms    QRS Duration 112 ms    Q-T Interval 396 ms    QTC Calculation (Bezet) 433 ms    Calculated P Axis 90 degrees    Calculated R Axis 69 degrees    Calculated T Axis 62 degrees    Diagnosis       Normal sinus rhythm  RSR' or QR pattern in V1 suggests right ventricular conduction delay  Borderline ECG  When compared with ECG of 24-OCT-2011 13:02,  No significant change was found     METABOLIC PANEL, COMPREHENSIVE    Collection Time: 08/15/19  5:50 PM   Result Value Ref Range    Sodium 139 136 - 145 mmol/L    Potassium 4.5 3.5 - 5.5 mmol/L    Chloride 110 100 - 111 mmol/L    CO2 24 21 - 32 mmol/L    Anion gap 5 3.0 - 18 mmol/L    Glucose 155 (H) 74 - 99 mg/dL    BUN 18 7.0 - 18 MG/DL    Creatinine 1.74 (H) 0.6 - 1.3 MG/DL    BUN/Creatinine ratio 10 (L) 12 - 20      GFR est AA 47 (L) >60 ml/min/1.73m2    GFR est non-AA 39 (L) >60 ml/min/1.73m2    Calcium 8.1 (L) 8.5 - 10.1 MG/DL    Bilirubin, total 0.2 0.2 - 1.0 MG/DL    ALT (SGPT) 14 (L) 16 - 61 U/L    AST (SGOT) 7 (L) 10 - 38 U/L    Alk.  phosphatase 100 45 - 117 U/L    Protein, total 6.9 6.4 - 8.2 g/dL    Albumin 3.1 (L) 3.4 - 5.0 g/dL    Globulin 3.8 2.0 - 4.0 g/dL    A-G Ratio 0.8 0.8 - 1.7     CBC WITH AUTOMATED DIFF    Collection Time: 08/15/19  5:50 PM   Result Value Ref Range    WBC 7.2 4.6 - 13.2 K/uL    RBC 3.58 (L) 4.70 - 5.50 M/uL    HGB 10.1 (L) 13.0 - 16.0 g/dL    HCT 31.4 (L) 36.0 - 48.0 %    MCV 87.7 74.0 - 97.0 FL    MCH 28.2 24.0 - 34.0 PG    MCHC 32.2 31.0 - 37.0 g/dL    RDW 15.1 (H) 11.6 - 14.5 %    PLATELET 394 898 - 590 K/uL    MPV 10.1 9.2 - 11.8 FL    NEUTROPHILS 61 40 - 73 %    LYMPHOCYTES 28 21 - 52 %    MONOCYTES 8 3 - 10 %    EOSINOPHILS 3 0 - 5 %    BASOPHILS 0 0 - 2 %    ABS. NEUTROPHILS 4.4 1.8 - 8.0 K/UL    ABS. LYMPHOCYTES 2.0 0.9 - 3.6 K/UL    ABS. MONOCYTES 0.6 0.05 - 1.2 K/UL    ABS. EOSINOPHILS 0.2 0.0 - 0.4 K/UL    ABS. BASOPHILS 0.0 0.0 - 0.1 K/UL    DF AUTOMATED         Radiologic Studies -   XR CHEST PA LAT    (Results Pending)     CT Results  (Last 48 hours)    None        CXR Results  (Last 48 hours)    None            Medical Decision Making   I am the first provider for this patient. I reviewed the vital signs, available nursing notes, past medical history, past surgical history, family history and social history. Vital Signs-Reviewed the patient's vital signs. Comparison:    Records Reviewed: Nursing Notes    Procedures:  Procedures    Provider Notes (Medical Decision Making):   Based upon the patient's reason for visit today I suspect hemolysis of the specimen from Mondays lab draw. CMP was redrawn to check potassium levels. Patient has no symptom symptoms of hyperkalemia. I did hear some expiratory wheezing on the right upper lobe, however chest x-ray was negative. Patient denies recent cough therefore I deferred treatment for wheezing. In fact, on second assessment of the lungs no wheezing was auscultated. MED RECONCILIATION:  No current facility-administered medications for this encounter. Current Outpatient Medications   Medication Sig    metFORMIN (GLUCOPHAGE) 1,000 mg tablet Take 1 Tab by mouth two (2) times daily (with meals).     levETIRAcetam (KEPPRA) 750 mg tablet Take 1 Tab by mouth two (2) times a day.    diazePAM (VALIUM) 5 mg tablet TAKE 1 TABLET BY MOUTH 2 TIMES A DAY *NOT TO EXCEED 2 TABLETS PER DAY*    gabapentin (NEURONTIN) 600 mg tablet Take 1 Tab by mouth three (3) times daily.  benazepril (LOTENSIN) 20 mg tablet Take 1 Tab by mouth daily.  clopidogrel (PLAVIX) 75 mg tab TAKE 1 TABLET BY MOUTH ONCE DAILY    carvedilol (COREG) 12.5 mg tablet Take 1 Tab by mouth two (2) times a day.  amLODIPine (NORVASC) 10 mg tablet Take 1 Tab by mouth daily.  lansoprazole (PREVACID) 30 mg capsule TAKE 1 CAPSULE BY MOUTH ONCE DAILY BEFORE BREAKFAST    atorvastatin (LIPITOR) 40 mg tablet Take 1 Tab by mouth daily.  naloxone (NARCAN) 4 mg/actuation nasal spray Use 1 spray intranasally into 1 nostril. Use a new Narcan nasal spray for subsequent doses and administer into alternating nostrils. May repeat every 2 to 3 minutes as needed.  acetaminophen (TYLENOL) 325 mg tablet Take  by mouth every four (4) hours as needed for Pain.  aspirin 81 mg chewable tablet Take 81 mg by mouth daily.  triamcinolone (ARISTOCORT) 0.5 % topical cream Apply  to affected area two (2) times a day. use thin layer (Patient taking differently: Apply  to affected area two (2) times a day. use thin layer  Indications: applying to left ear)    patiromer calcium sorbitex (VELTASSA) 16.8 gram powder 1 dose now and another one 12 hours later       Disposition:  Home    DISCHARGE NOTE:     Pt has been reexamined. Patient has no new complaints, changes, or physical findings. Care plan outlined and precautions discussed. Results of labs were reviewed with the patient. All medications were reviewed with the patient; will d/c home with follow up as needed. All of pt's questions and concerns were addressed. Patient was instructed and agrees to follow up with PCP, as well as to return to the ED upon further deterioration. Patient is ready to go home.     Follow-up Information     Follow up With Specialties Details Why Contact Info    Candelaria Anderson MD Internal Medicine   [address] [facility] [address] [phone] [address] [facility] EMERGENCY DEPT Emergency Medicine  If symptoms worsen [address] [facility]  [phone]          Current Discharge Medication List              Diagnosis     Clinical Impression:   1.  Abnormal laboratory test result

## 2019-08-15 NOTE — TELEPHONE ENCOUNTER
Contacted pharmacy to verify if they had the patiromer. Havsjo Delikatesser stated patients insurance will not cover and they only have the 30 packets. After speaking with  he stated disregard and have patient go to ED. Spoke with patients advising her that we have been unsuccessful in getting the medication for Mr. Johnson and Shar Gr recommends he report to ED. Patient wife questioned why was his potassium elevated, why he needed to go to ED and what happens if you decided not to go to ED. Advised her could be a lab error but we dont want to take any chances and I explained to her why he needed to go to ED because everything Shar Gr has tried to order the pharmacy does not have and what could happen if they decided not to go to ED. She stated patient was fine. I advised her that I understood that and that its best to still go to ED to have lab rechecked and if elevated they will need to give him something to bring number down.

## 2019-08-15 NOTE — TELEPHONE ENCOUNTER
Toy Mason at Pharmacy calling says they cannot get the Kayexalate. It is on back order and they have no idea when they will start getting again.  Please advise what you want them to do

## 2019-08-16 ENCOUNTER — TELEPHONE (OUTPATIENT)
Dept: INTERNAL MEDICINE CLINIC | Age: 69
End: 2019-08-16

## 2019-08-16 LAB
ATRIAL RATE: 72 BPM
CALCULATED P AXIS, ECG09: 90 DEGREES
CALCULATED R AXIS, ECG10: 69 DEGREES
CALCULATED T AXIS, ECG11: 62 DEGREES
DIAGNOSIS, 93000: NORMAL
P-R INTERVAL, ECG05: 136 MS
Q-T INTERVAL, ECG07: 396 MS
QRS DURATION, ECG06: 112 MS
QTC CALCULATION (BEZET), ECG08: 433 MS
VENTRICULAR RATE, ECG03: 72 BPM

## 2019-08-16 NOTE — TELEPHONE ENCOUNTER
Wife has called again and said it was a waste of time for them to go to ER, doctors there said pt was perfectly fine. She wanted to make sure we had pt's records from ER Visit and I assured her they were in his chart.

## 2019-08-16 NOTE — TELEPHONE ENCOUNTER
Spoke with patients wife and advised her that I told her when I spoke with her on yesterday that it could be a error but Rupa Crane did not want to take any chances because he could not get the medications to bring potassium down. She stated okay and see you Monday.

## 2019-08-19 ENCOUNTER — OFFICE VISIT (OUTPATIENT)
Dept: INTERNAL MEDICINE CLINIC | Age: 69
End: 2019-08-19

## 2019-08-19 ENCOUNTER — HOSPITAL ENCOUNTER (OUTPATIENT)
Dept: LAB | Age: 69
Discharge: HOME | End: 2019-08-19
Payer: MEDICARE

## 2019-08-19 VITALS
SYSTOLIC BLOOD PRESSURE: 120 MMHG | HEART RATE: 66 BPM | OXYGEN SATURATION: 97 % | WEIGHT: 169 LBS | TEMPERATURE: 97.7 F | BODY MASS INDEX: 23.66 KG/M2 | DIASTOLIC BLOOD PRESSURE: 64 MMHG | RESPIRATION RATE: 14 BRPM | HEIGHT: 71 IN

## 2019-08-19 DIAGNOSIS — M19.90 OSTEOARTHRITIS, UNSPECIFIED OSTEOARTHRITIS TYPE, UNSPECIFIED SITE: ICD-10-CM

## 2019-08-19 DIAGNOSIS — I10 PRIMARY HYPERTENSION: ICD-10-CM

## 2019-08-19 DIAGNOSIS — R56.9 NEW ONSET SEIZURE (HCC): ICD-10-CM

## 2019-08-19 DIAGNOSIS — M54.50 CHRONIC LOW BACK PAIN WITHOUT SCIATICA, UNSPECIFIED BACK PAIN LATERALITY: ICD-10-CM

## 2019-08-19 DIAGNOSIS — D50.9 IRON DEFICIENCY ANEMIA, UNSPECIFIED IRON DEFICIENCY ANEMIA TYPE: ICD-10-CM

## 2019-08-19 DIAGNOSIS — Z79.899 MEDICATION MANAGEMENT: ICD-10-CM

## 2019-08-19 DIAGNOSIS — I73.9 PERIPHERAL VASCULAR DISEASE (HCC): ICD-10-CM

## 2019-08-19 DIAGNOSIS — N18.31 CHRONIC KIDNEY DISEASE (CKD) STAGE G3A/A3, MODERATELY DECREASED GLOMERULAR FILTRATION RATE (GFR) BETWEEN 45-59 ML/MIN/1.73 SQUARE METER AND ALBUMINURIA CREATININE RATIO GREATER THAN 300 MG/G (HCC): ICD-10-CM

## 2019-08-19 DIAGNOSIS — K21.9 GERD WITHOUT ESOPHAGITIS: ICD-10-CM

## 2019-08-19 DIAGNOSIS — G89.29 CHRONIC LOW BACK PAIN WITHOUT SCIATICA, UNSPECIFIED BACK PAIN LATERALITY: ICD-10-CM

## 2019-08-19 DIAGNOSIS — R80.9 CONTROLLED TYPE 2 DIABETES MELLITUS WITH MICROALBUMINURIA, WITHOUT LONG-TERM CURRENT USE OF INSULIN (HCC): Primary | ICD-10-CM

## 2019-08-19 DIAGNOSIS — E11.40 TYPE 2 DIABETES MELLITUS WITH DIABETIC NEUROPATHY, WITHOUT LONG-TERM CURRENT USE OF INSULIN (HCC): ICD-10-CM

## 2019-08-19 DIAGNOSIS — E11.29 CONTROLLED TYPE 2 DIABETES MELLITUS WITH MICROALBUMINURIA, WITHOUT LONG-TERM CURRENT USE OF INSULIN (HCC): Primary | ICD-10-CM

## 2019-08-19 DIAGNOSIS — E78.5 HYPERLIPIDEMIA, UNSPECIFIED HYPERLIPIDEMIA TYPE: ICD-10-CM

## 2019-08-19 PROCEDURE — 80346 BENZODIAZEPINES1-12: CPT

## 2019-08-19 PROCEDURE — 80307 DRUG TEST PRSMV CHEM ANLYZR: CPT

## 2019-08-19 NOTE — LETTER
Name:Madan Johnson YXK:[birth year] MR #:[id number] 73 [facility] Page 1 of 5 CONTROLLED SUBSTANCE AGREEMENT I may be prescribed medications that are controlled substances as part  of my treatment plan for management of my medical condition(s). The goal of my treatment plan is to maintain and/or improve my health and wellbeing. Because controlled substances have an increased risk of abuse or harm, continual re-evaluation is needed determine if the goals of my treatment plan are being met for my safety and the safety of others. Dave Aguilar  am entering into this Controlled Substance Agreement with my provider, Desire Pack at [place] [address] . I understand that successful treatment requires mutual trust and honesty between me and my provider. I understand that there are state and federal laws and regulations which apply to the medications that my provider may prescribe that must be followed. I understand there are risks and benefits ts of taking the medicines that my provider may prescribe. I understand and agree that following this Agreement is necessary in continuing my provider-patient relationship and success of my treatment plan. As a part of my treatment plan, I agree to the following: COMMUNICATION: 
 
1. I will communicate fully with my provider about my medical condition(s), including the effect on my daily life and how well my medications are helping. I will tell my provider all of the medications that I take for any reason, including medications I receive from another health care provider, and will notify my provider about all issues, problems or concerns, including any side effects, which may be related to my medications. I understand that this information allows my provider to adjust my treatment plan to help manage my medical condition. I understand that this information will become part of my permanent medical record. 2. I will notify my provider if I have a history of alcohol/drug misuse/addiction or if I have had treatment for alcohol/drug addiction in the past, or if I have a new problem with or concern about alcohol/drug use/addiction, because this increases the likelihood of high risk behaviors and may lead to serious medical conditions. 3. Females Only: I will notify my provider if I am or become pregnant, or if I intend to become pregnant, or if I intend to breastfeed. I understand that communication of these issues with my provider is important, due to possible effects my medication could have on an unborn fetus or breastfeeding child. Initials_____ Name:Madan Johnson WOZ:2/82/1862 MR #:[id number] 73 [facility] Page 2 of 5 MISUSE OF MEDICATIONS / DRUGS: 
 
1. I agree to take all controlled substances as prescribed, and will not misuse or abuse any controlled substances prescribed by my provider. For my safety, I will not increase the amount of medicine I take without first talking with and getting permission from my provider. 2. If I have a medical emergency, another health care provider may prescribe me medication. If I seek emergency treatment, I will notify my provider within seventy-two (72) hours. 3. I understand that my provider may discuss my use and/or possible misuse/abuse of controlled substances and alcohol, as appropriate, with any health care provider involved in my care, pharmacist or legal authority. ILLEGAL DRUGS: 
 
1. I will not use illegal drugs of any kind, including but not limited to marijuana, heroin, cocaine, or any prescription drug which is not prescribed to me. DRUG DIVERSION / PRESCRIPTION FRAUD: 
 
1. I will not share, sell, trade, give away, or otherwise misuse my prescriptions or medications. 2. I will not alter any prescriptions provided to me by my provider.  
 
SINGLE PROVIDER: 
 
 1. I agree that all controlled substances that I take will be prescribed only by my provider (or his/her covering provider) under this Agreement. This agreement does not prevent me from seeking emergency medical treatment or receiving pain management related to a surgery. PROTECTING MEDICATIONS: 
 
1. I am responsible for keeping my prescriptions and medications in a safe and secure place including safeguarding them from loss or theft. I understand that lost, stolen or damaged/destroyed prescriptions or medications will not be replaced. Initials____ Name:Madan Johnson [facility]:[birth year] MR #:[id number] [address] [place] [address] Page 3 of 5 PRESCRIPTION RENEWALS/REFILLS: 
 
1. I will follow my controlled substance medication schedule as prescribed by my provider. 2. I understand and agree that I will make any requests for renewals or refills of my prescriptions only at the time of an office visit or during my providers regular office hours subject to the prescription refill requirements of the individual practice. 3. I understand that my provider may not call in prescriptions for controlled substances to my pharmacy. 4. I understand that my provider may adjust or discontinue these medications as deemed appropriate for my medical treatment plan. This Agreement does not guarantee the prescription of controlled medications. 5. I agree that if my medications are adjusted or discontinued, I will properly dispose of any remaining medications. I understand that I will be required to dispose of any remaining controlled medications prior to being provided with any prescriptions for other controlled medications. 6. I understand that the renewal of my prescription depends on my medical condition, my consistent participation, and my adherence with my treatment plan and this Agreement.  
 
7. I understand that if I do not keep an appointment with my provider, I may not receive a renewal or refill for my controlled substance medication. PRESCRIPTION MONITORING / DRUG TESTIN. I understand that my provider may require me to provide urine, saliva or blood for testing at any time. I understand that this testing will be used to monitor for safety and adherence with my treatment plan and this Agreement. 2. I understand that my provider may ask me to provide an observed urine specimen, which means that a nurse or other health care provider may watch me provide urine, and I agree to cooperate if I am asked to provide an observed specimen. 3. I understand that if I do not provide urine, saliva or blood samples within two (2) hours of my providers request, or other timeframe decided by my provider, my treatment plan could be changed, or my prescriptions and medications may be changed or ended. 4. I understand that urine, saliva and blood test results will be a part of my permanent medical record. Initials_____ Name:Madan Johnson UYP: MR #:[id number] 73 [facility] Page 4 of 5 
 
5. I understand that my provider is required to obtain a copy of my State Prescription Monitoring Program () Report at any time in order to safely prescribe medications. 6. I will bring all of my prescribed controlled substance medications in their original bottles to all of my scheduled appointments. 7. I understand that my provider may ask me to come to the practice with all of my prescribed medications for a random pill count at any time. I agree to cooperate if I am asked to come in for a random pill count. I understand that if I do not arrive in the timeframe decided by my provider, my treatment plan could be changed, or my prescriptions and medications may be changed or ended.  
 
COOPERATION WITH INVESTIGATIONS: 
 
1. I authorize my provider and my pharmacy to cooperate fully with any local, state, or federal law enforcement agency in the investigation of any possible misuse, sale, or other diversion of my controlled substance prescriptions or medications. RISKS: 
 
1. I understand that my level of consciousness may be affected from the use of controlled substances, and I understand that there are risks, benefits, effects and potential alternatives (including no treatment) to the medications that my provider has prescribed. 2. I understand that I may become drowsy, tired, dizzy, constipated, and sick to my stomach, or have changes in my mood or in my sleep while taking my medications. I have talked with my provider about these possible side effects, risks, benefits, and alternative treatments, and my provider has answered all of my questions. 3. I understand that I should not suddenly stop taking my medications without first speaking with my provider. I understand that if I suddenly stop taking my medications, I may experience nausea, vomiting, sweating,anxiety, sleeplessness, itching or other uncomfortable feelings. 4. I will not take my medications with alcohol of any kind, including beer, wine or liquor. I understand that drinking alcohol with my medications increases the chances of side effects, including breathing problems or even death. 5. I understand that if I have a history of alcoholism or other drug addiction I may be at increased risk of addiction to my medications. Signs of addiction might include craving, compulsive use, and continued use despite harm. Since addiction is a disease, I understand my provider may decide to change my medications and refer me to appropriate treatment services. I understand that this information would become part of my permanent medical record. Initials_____ Name:Madan Johnson PYO:[birth year] MR #:[id number] [address] [place] [address] Page 5 of 5  
 
 
6.  Females only: Children born to women who regularly take controlled substances are likely to have physical problems and suffer withdrawal symptoms at birth. If I am of child-bearing age, I understand that I should use safe and effective birth control while taking any controlled substances to avoid the impact of medications on an unborn fetus or  child. I agree to notify my provider immediately if I should become pregnant so that my treatment plan can be adjusted. 7. Males only: I understand that chronic use of controlled substances has been associated with low testosterone levels in males which may affect my mood, stamina, sexual desire, and general health. I understand that my provider may order the appropriate laboratory test to determine my testosterone level,and I agree to this testing. ADHERENCE: 
 
1. I understand that if I do not adhere to this Agreement in any way, my provider may change my prescriptions, stop prescribing controlled substances or end our provider-patient relationship. 2. If my provider decides to stop prescribing medication, or decides to end our provider-patient relationship,my provider may require that I taper my medications slowly. If necessary, my provider may also provide a prescription for other medications to treat my withdrawal symptoms. UNDERSTANDING THIS AGREEMENT: 
 
I understand that my provider may adjust or stop my prescriptions for controlled substances based on my medical condition and my treatment plan. I understand that this Agreement does not guarantee that I will be prescribed medications or controlled substances. I understand that controlled substances may be just one part 
of my treatment plan. My initial on each page and my signature below shows that I have read each page of this Agreement, I have had an opportunity to ask questions, and all of my questions have been answered to my satisfaction by my provider.  
 
By signing below, I agree to comply with this Agreement, and I understand that if I do not follow the Agreements listed above, my provider may stop 
 
_________________________________________  Date/Time 8/19/2019 9:20 AM   
             (Patient Signature) 
 
________________________________________    Date/Time 8/19/2019 9:20 AM 
 (Parent or Guardian Signature if <18 yrs) 
 
_________________________________________ Date/Time 8/19/2019 9:20 AM 
 (Provider Signature)

## 2019-08-19 NOTE — PROGRESS NOTES
Alex Francois presents today for   Chief Complaint   Patient presents with    Diabetes     6 month follow up   [facility] Follow Up     was told to report to ED for elevated Potassium               Depression Screening:  3 most recent PHQ Screens 4/2/2019   Little interest or pleasure in doing things Not at all   Feeling down, depressed, irritable, or hopeless Not at all   Total Score PHQ 2 0       Learning Assessment:  Learning Assessment 2/4/2019   PRIMARY LEARNER Patient   HIGHEST LEVEL OF EDUCATION - PRIMARY LEARNER  GRADUATED HIGH SCHOOL OR GED   BARRIERS PRIMARY LEARNER NONE   CO-LEARNER CAREGIVER No   PRIMARY LANGUAGE ENGLISH   LEARNER PREFERENCE PRIMARY DEMONSTRATION   ANSWERED BY patient   RELATIONSHIP SELF       Abuse Screening:  Abuse Screening Questionnaire 2/4/2019   Do you ever feel afraid of your partner? N   Are you in a relationship with someone who physically or mentally threatens you? N   Is it safe for you to go home? Y       Fall Risk  Fall Risk Assessment, last 12 mths 4/2/2019   Able to walk? Yes   Fall in past 12 months? No   Fall with injury? -   Number of falls in past 12 months -   Fall Risk Score -           Coordination of Care:  1. Have you been to the ER, urgent care clinic since your last visit? Hospitalized since your last visit? Yes 8/15/19    2. Have you seen or consulted any other health care providers outside of the [address] [place] [address] [facility] since your last visit? Include any pap smears or colon screening. No      Patient made aware of regulations per Board of medicine in regards to controlled substances. Patient aware a urine drug screen is needed and a controlled substance agreement needs to be signed. Per verbal order from [address] a urine drug was ordered.

## 2019-08-20 PROBLEM — E11.40 TYPE 2 DIABETES MELLITUS WITH DIABETIC NEUROPATHY (HCC): Status: ACTIVE | Noted: 2019-08-20

## 2019-08-20 PROBLEM — E66.3 OVERWEIGHT (BMI 25.0-29.9): Status: RESOLVED | Noted: 2017-11-30 | Resolved: 2019-08-20

## 2019-08-23 LAB
ALPRAZ UR QL: NEGATIVE
AMPHETAMINES UR QL SCN: NEGATIVE NG/ML
BARBITURATES UR QL SCN: NEGATIVE NG/ML
BENZODIAZ UR QL SCN: NORMAL NG/ML
BENZODIAZ UR QL: POSITIVE NG/ML
BZE UR QL SCN: NEGATIVE NG/ML
CANNABINOIDS UR QL SCN: NEGATIVE NG/ML
CLONAZEPAM UR QL: NEGATIVE
CREAT UR-MCNC: 263.1 MG/DL (ref 20–300)
FENTANYL+NORFENTANYL UR QL SCN: NEGATIVE PG/ML
FLURAZEPAM UR QL: NEGATIVE
LORAZEPAM UR QL: NEGATIVE
MEPERIDINE UR QL: NEGATIVE NG/ML
METHADONE UR QL SCN: NEGATIVE NG/ML
MIDAZOLAM UR QL CFM: NEGATIVE
NORDIAZEPAM UR CFM-MCNC: 987 NG/ML
NORDIAZEPAM UR QL: POSITIVE
OPIATES UR QL SCN: NEGATIVE NG/ML
OXAZEPAM UR CFM-MCNC: 3858 NG/ML
OXAZEPAM UR QL: POSITIVE
OXYCODONE+OXYMORPHONE UR QL SCN: NEGATIVE NG/ML
PCP UR QL: NEGATIVE NG/ML
PH UR: 5.1 [PH] (ref 4.5–8.9)
PLEASE NOTE:, 733157: NORMAL
PROPOXYPH UR QL SCN: NEGATIVE NG/ML
SP GR UR: 1.01
TEMAZEPAM UR CFM-MCNC: 2264 NG/ML
TEMAZEPAM UR QL CFM: POSITIVE
TRAMADOL UR QL SCN: NEGATIVE NG/ML
TRIAZOLAM UR QL: NEGATIVE

## 2019-10-03 DIAGNOSIS — E78.5 HYPERLIPIDEMIA, UNSPECIFIED HYPERLIPIDEMIA TYPE: ICD-10-CM

## 2019-10-04 ENCOUNTER — TELEPHONE (OUTPATIENT)
Dept: INTERNAL MEDICINE CLINIC | Age: 69
End: 2019-10-04

## 2019-10-04 DIAGNOSIS — F43.9 STRESS: ICD-10-CM

## 2019-10-04 RX ORDER — ATORVASTATIN CALCIUM 40 MG/1
TABLET, FILM COATED ORAL
Qty: 30 TAB | Refills: 11 | Status: SHIPPED | OUTPATIENT
Start: 2019-10-04 | End: 2020-01-01

## 2019-10-04 RX ORDER — DIAZEPAM 5 MG/1
TABLET ORAL
Qty: 60 TAB | Refills: 0 | Status: SHIPPED | OUTPATIENT
Start: 2019-10-04 | End: 2019-10-31 | Stop reason: SDUPTHER

## 2019-10-31 DIAGNOSIS — F43.9 STRESS: ICD-10-CM

## 2019-10-31 RX ORDER — DIAZEPAM 5 MG/1
TABLET ORAL
Qty: 60 TAB | Refills: 0 | Status: SHIPPED | OUTPATIENT
Start: 2019-10-31 | End: 2020-02-12 | Stop reason: SDUPTHER

## 2019-11-07 ENCOUNTER — OFFICE VISIT (OUTPATIENT)
Dept: VASCULAR SURGERY | Age: 69
End: 2019-11-07

## 2019-11-07 VITALS
DIASTOLIC BLOOD PRESSURE: 68 MMHG | WEIGHT: 169 LBS | RESPIRATION RATE: 16 BRPM | HEART RATE: 80 BPM | HEIGHT: 71 IN | SYSTOLIC BLOOD PRESSURE: 134 MMHG | BODY MASS INDEX: 23.66 KG/M2

## 2019-11-07 DIAGNOSIS — I73.9 PERIPHERAL VASCULAR DISEASE (HCC): Primary | ICD-10-CM

## 2019-11-07 DIAGNOSIS — I65.23 CAROTID STENOSIS, ASYMPTOMATIC, BILATERAL: ICD-10-CM

## 2019-11-07 NOTE — PROGRESS NOTES
[facility]    Chief Complaint   Patient presents with    Carotid Artery Stenosis       History and Physical    Mr Parmjit Combs is here for a yearly follow up surveillance visit, with remote history of aorto bi fem bypass for symptomatic claudication. He has denied any recurrent claudication. Marina Hamilton has continued to have occasional back pain but no leg concerns presently. He has continued to remain tobacco free and is on good risk factor control otherwise     He was told previously that he had stage III kidney disease. Had renal ultrasound, and was found to have about 60% renal artery stenosis (unilateral). His BP is otherwise well controlled and no elevations in bun/cr.  Our vascular lab showed no stenosis, so we stated we will only repeat if new concerns with BP or labs - only recent lab issues have been with potassium levels, but reportedly hemolyzed blood skewed the results    Past Medical History:   Diagnosis Date    Anemia     fe def w gi w/u Dr Collin Gamez cell carcinoma     s/p resection Dr. Kay Andrews Chronic back pain inoperable Dr. Yvan Cerda and Dr. Arnoldo Flores Chronic kidney disease 04/2017    saw Dr Minor Chaudhry    Colon adenoma     and diverticulosis Dr Maria Fernanda Bruner 2014    Diabetes mellitus ([address]     microalbuminuria    DJD (degenerative joint disease)     Dyshidrotic eczema     Dyslipidemia     ED (erectile dysfunction)     FHx: heart disease     Gastritis     2014 Dr Maria Fernanda Bruner neg h pylori    GERD (gastroesophageal reflux disease) 2014    on EGD Dr Maria Fernanda Bruner    Hypertension     Left renal artery stenosis ([address] 11/30/2017    f/u duplex 11/18 by Jayne Wong was negative    Meniere's disease     s/p surgery Dr Faisal Bo    Overweight (BMI 25.0-29.9) 11/30/2017    PAD (peripheral artery disease) ([address]     Seizure ([address] 03/2019    possible Dr Kay Cowden at [facility]; mri neg, EEG neg, on keppra    Seizures ([address]     Syncope 02/2018    probably vasovagal [facility]     Patient Active Problem List   Diagnosis Code    Hyperlipidemia E78.5    Chronic back pain inoperable Dr. Jacob Morrell and Dr. Florencia Baca M54.9, G89.29    Peripheral vascular disease s/p left iliac bpg Dr. Ayush Hernandez 10/10 I73.9    Erectile dysfunction N52.9    Arthritis, degenerative M19.90    Primary hypertension I10    GERD without esophagitis K21.9    Controlled type 2 diabetes mellitus with albuminuria E11.29, R80.9    Advance directive in chart Z78.9    Chronic kidney disease (CKD) stage G3a/A3 N18.3    New onset seizure ([facility] R56.9    Type 2 diabetes mellitus with diabetic neuropathy ([facility] E11.40     Past Surgical History:   Procedure Laterality Date    CARDIAC SURG PROCEDURE UNLIST  10/10    NST neg EF 68%    CARDIAC SURG PROCEDURE UNLIST  02/2018    echo nl LV, ef 62%, mild mr/tr/pr, rvp 33 [facility]    HX CHOLECYSTECTOMY  2011    Dr. Parminder Lance HX COLONOSCOPY  2014    adenoma and divertics Dr Rose Samano    HX GI  2014    pill camera non bleeding ileal AVM    HX TONSILLECTOMY      NEUROLOGICAL PROCEDURE UNLISTED  03/2019    mri head contrast showed no acute lesions    SINUS SURGERY PROC UNLISTED      left ear surgery Dr Ben Torres for meniere's    VASCULAR SURGERY PROCEDURE UNLIST  10/10    [address] left Graft Surgery  Dr. Matt De La O  03/2019    carotids showed 82%ROTA, <50%LICA     Current Outpatient Medications   Medication Sig Dispense Refill    diazePAM (VALIUM) 5 mg tablet TAKE 1 TABLET BY MOUTH 2 TIMES A DAY (NOT TO EXCEED 2 TABS PER DAY) 60 Tab 0    atorvastatin (LIPITOR) 40 mg tablet TAKE 1 TABLET BY MOUTH ONCE DAILY 30 Tab 11    metFORMIN (GLUCOPHAGE) 1,000 mg tablet Take 1 Tab by mouth two (2) times daily (with meals). 180 Tab 3    levETIRAcetam (KEPPRA) 750 mg tablet Take 1 Tab by mouth two (2) times a day. 180 Tab 3    gabapentin (NEURONTIN) 600 mg tablet Take 1 Tab by mouth three (3) times daily. 270 Tab 3    benazepril (LOTENSIN) 20 mg tablet Take 1 Tab by mouth daily.  90 Tab 3    clopidogrel (PLAVIX) 75 mg tab TAKE 1 TABLET BY MOUTH ONCE DAILY 90 Tab 3    carvedilol (COREG) 12.5 mg tablet Take 1 Tab by mouth two (2) times a day. 180 Tab 3    amLODIPine (NORVASC) 10 mg tablet Take 1 Tab by mouth daily. 90 Tab 3    lansoprazole (PREVACID) 30 mg capsule TAKE 1 CAPSULE BY MOUTH ONCE DAILY BEFORE BREAKFAST 90 Cap 3    naloxone (NARCAN) 4 mg/actuation nasal spray Use 1 spray intranasally into 1 nostril. Use a new Narcan nasal spray for subsequent doses and administer into alternating nostrils. May repeat every 2 to 3 minutes as needed. 1 Each 1    acetaminophen (TYLENOL) 325 mg tablet Take  by mouth every four (4) hours as needed for Pain.  aspirin 81 mg chewable tablet Take 81 mg by mouth daily.  triamcinolone (ARISTOCORT) 0.5 % topical cream Apply  to affected area two (2) times a day. use thin layer (Patient taking differently: Apply  to affected area two (2) times a day. use thin layer  Indications: applying to left ear) 60 g 3     No Known Allergies      Physical   Visit Vitals  /68 (BP 1 Location: Left arm, BP Patient Position: Sitting)   Pulse 80   Resp 16   Ht 5' 11\" (1.803 m)   Wt 169 lb (76.7 kg)   BMI 23.57 kg/m²     General:  Alert, cooperative, no distress. Head:  Normocephalic, without obvious abnormality, atraumatic. Eyes:     Conjunctivae/corneas clear. Pupils equal, round, reactive to light. Extraocular movements intact. Neck:         No bruits   Lungs:   Clear to auscultation bilaterally. Heart:  Regular rate and rhythm, S1, S2 normal   Extremities: Extremities normal, atraumatic, no cyanosis or edema. Pulses: 1+ and symmetric all extremities. Skin: Skin color, texture, turgor normal. No rashes or lesions     Vascular studies:  PHILLIP     The right resting PHILLIP is normal. The left resting PHILLIP is normal. The right common femoral artery, popliteal artery, anterior tibial artery and posterior tibial artery has biphasic waveforms. Right toe PPG is dampened.  The left common femoral artery, popliteal artery, anterior tibial artery and posterior tibial artery has biphasic waveforms. Left toe PPG is dampened. Hx. Of LT VALENTINA-CFA bpg 10-11-10. 2-6-12 Aorta to left iliac & aorta to rt fem bpg. Carotid:  Mild stenosis noted within bilateral internal carotid arteries  No evidence of hemodynamically significant arterial disease is identified within bilateral vertebral arteries  Equal blood pressures bilaterally    Impression/Plan:     ICD-10-CM ICD-9-CM    1. Peripheral vascular disease s/p left iliac bpg Dr. Alma Curiel 10/10 I73.9 443.9 LOWER EXT ART PVR MULT LEVEL SEG PRESSURES   2. Carotid stenosis, asymptomatic, bilateral I65.23 433.10      433.30      No orders of the defined types were placed in this encounter. reassured results  Reviewed symptoms to alert us if any concerns  Just need to repeat leg studies in one year, carotid study in 2 years     MICK Tran    Portions of this note have been entered using voice recognition software.

## 2019-11-07 NOTE — PROGRESS NOTES
Order placed for bilateral carotid duplex and leg arterial per verbal order from [place] [place] . Order was placed due to order had  in [facility].

## 2019-11-12 DIAGNOSIS — K21.9 GERD WITHOUT ESOPHAGITIS: ICD-10-CM

## 2019-11-13 RX ORDER — LANSOPRAZOLE 30 MG/1
CAPSULE, DELAYED RELEASE ORAL
Qty: 90 CAP | Refills: 3 | Status: SHIPPED | OUTPATIENT
Start: 2019-11-13 | End: 2020-01-01

## 2019-12-26 DIAGNOSIS — M54.50 CHRONIC LOW BACK PAIN WITHOUT SCIATICA, UNSPECIFIED BACK PAIN LATERALITY: Primary | ICD-10-CM

## 2019-12-26 DIAGNOSIS — G89.29 CHRONIC LOW BACK PAIN WITHOUT SCIATICA, UNSPECIFIED BACK PAIN LATERALITY: Primary | ICD-10-CM

## 2019-12-26 NOTE — TELEPHONE ENCOUNTER
Gabapentin is now a controlled substance, therefore the prescription and refills are only valid for 6 months. I am no longer authorized to pull PMPs for Dr. Monique Del Cid. Last Visit: 08/19/2019 with MD Monique Del Cid  Next Appointment: 01/29/2019 with MD Monique Del Cid   Previous Refill Encounter(s): 04/08/2019 per MD Monique Del Cid #[id number]    Requested Prescriptions     Pending Prescriptions Disp Refills    gabapentin (NEURONTIN) 600 mg tablet 270 Tab 1     Sig: Take 1 Tab by mouth three (3) times daily.

## 2019-12-27 RX ORDER — GABAPENTIN 600 MG/1
600 TABLET ORAL 3 TIMES DAILY
Qty: 270 TAB | Refills: 1 | Status: SHIPPED | OUTPATIENT
Start: 2019-12-27 | End: 2020-01-01

## 2020-01-01 ENCOUNTER — OFFICE VISIT (OUTPATIENT)
Dept: ONCOLOGY | Age: 70
End: 2020-01-01
Payer: MEDICARE

## 2020-01-01 ENCOUNTER — VIRTUAL VISIT (OUTPATIENT)
Dept: INTERNAL MEDICINE CLINIC | Age: 70
End: 2020-01-01

## 2020-01-01 ENCOUNTER — TELEPHONE (OUTPATIENT)
Dept: INTERNAL MEDICINE CLINIC | Age: 70
End: 2020-01-01

## 2020-01-01 ENCOUNTER — HOSPITAL ENCOUNTER (OUTPATIENT)
Dept: LAB | Age: 70
Discharge: HOME | End: 2020-10-16
Payer: MEDICARE

## 2020-01-01 ENCOUNTER — HOSPITAL ENCOUNTER (OUTPATIENT)
Dept: INFUSION THERAPY | Age: 70
Discharge: HOME | End: 2020-11-24
Payer: MEDICARE

## 2020-01-01 ENCOUNTER — APPOINTMENT (OUTPATIENT)
Dept: INTERNAL MEDICINE CLINIC | Age: 70
End: 2020-01-01

## 2020-01-01 ENCOUNTER — OFFICE VISIT (OUTPATIENT)
Dept: VASCULAR SURGERY | Age: 70
End: 2020-01-01
Payer: MEDICARE

## 2020-01-01 ENCOUNTER — HOSPITAL ENCOUNTER (OUTPATIENT)
Dept: INFUSION THERAPY | Age: 70
Discharge: HOME | End: 2020-12-01
Payer: MEDICARE

## 2020-01-01 VITALS
RESPIRATION RATE: 16 BRPM | DIASTOLIC BLOOD PRESSURE: 70 MMHG | SYSTOLIC BLOOD PRESSURE: 144 MMHG | HEART RATE: 71 BPM | TEMPERATURE: 97.6 F

## 2020-01-01 VITALS
OXYGEN SATURATION: 99 % | DIASTOLIC BLOOD PRESSURE: 73 MMHG | RESPIRATION RATE: 18 BRPM | HEART RATE: 75 BPM | SYSTOLIC BLOOD PRESSURE: 152 MMHG | WEIGHT: 155 LBS | BODY MASS INDEX: 21.7 KG/M2 | HEIGHT: 71 IN

## 2020-01-01 VITALS
WEIGHT: 155 LBS | SYSTOLIC BLOOD PRESSURE: 132 MMHG | OXYGEN SATURATION: 96 % | BODY MASS INDEX: 21.7 KG/M2 | HEART RATE: 68 BPM | RESPIRATION RATE: 18 BRPM | DIASTOLIC BLOOD PRESSURE: 64 MMHG | HEIGHT: 71 IN

## 2020-01-01 VITALS
TEMPERATURE: 97.5 F | WEIGHT: 158 LBS | RESPIRATION RATE: 16 BRPM | OXYGEN SATURATION: 94 % | BODY MASS INDEX: 22.12 KG/M2 | SYSTOLIC BLOOD PRESSURE: 161 MMHG | HEART RATE: 78 BPM | DIASTOLIC BLOOD PRESSURE: 79 MMHG | HEIGHT: 71 IN

## 2020-01-01 VITALS
DIASTOLIC BLOOD PRESSURE: 69 MMHG | OXYGEN SATURATION: 98 % | TEMPERATURE: 97.9 F | SYSTOLIC BLOOD PRESSURE: 132 MMHG | RESPIRATION RATE: 16 BRPM | HEART RATE: 72 BPM

## 2020-01-01 DIAGNOSIS — F43.9 STRESS: ICD-10-CM

## 2020-01-01 DIAGNOSIS — E11.40 TYPE 2 DIABETES MELLITUS WITH DIABETIC NEUROPATHY, WITHOUT LONG-TERM CURRENT USE OF INSULIN (HCC): ICD-10-CM

## 2020-01-01 DIAGNOSIS — K21.9 GERD WITHOUT ESOPHAGITIS: ICD-10-CM

## 2020-01-01 DIAGNOSIS — D64.9 NORMOCYTIC ANEMIA: Primary | ICD-10-CM

## 2020-01-01 DIAGNOSIS — I10 PRIMARY HYPERTENSION: ICD-10-CM

## 2020-01-01 DIAGNOSIS — I73.9 PERIPHERAL VASCULAR DISEASE (HCC): Primary | ICD-10-CM

## 2020-01-01 DIAGNOSIS — G89.29 CHRONIC LOW BACK PAIN WITHOUT SCIATICA, UNSPECIFIED BACK PAIN LATERALITY: ICD-10-CM

## 2020-01-01 DIAGNOSIS — E78.5 HYPERLIPIDEMIA, UNSPECIFIED HYPERLIPIDEMIA TYPE: ICD-10-CM

## 2020-01-01 DIAGNOSIS — I73.9 PERIPHERAL VASCULAR DISEASE (HCC): ICD-10-CM

## 2020-01-01 DIAGNOSIS — R80.9 CONTROLLED TYPE 2 DIABETES MELLITUS WITH MICROALBUMINURIA, WITHOUT LONG-TERM CURRENT USE OF INSULIN (HCC): ICD-10-CM

## 2020-01-01 DIAGNOSIS — M54.50 CHRONIC LOW BACK PAIN WITHOUT SCIATICA, UNSPECIFIED BACK PAIN LATERALITY: ICD-10-CM

## 2020-01-01 DIAGNOSIS — E78.00 PURE HYPERCHOLESTEROLEMIA: ICD-10-CM

## 2020-01-01 DIAGNOSIS — D64.9 NORMOCYTIC ANEMIA: ICD-10-CM

## 2020-01-01 DIAGNOSIS — D50.9 MICROCYTIC ANEMIA: Primary | ICD-10-CM

## 2020-01-01 DIAGNOSIS — D50.9 IRON DEFICIENCY ANEMIA, UNSPECIFIED IRON DEFICIENCY ANEMIA TYPE: Primary | ICD-10-CM

## 2020-01-01 DIAGNOSIS — D64.9 CHRONIC ANEMIA: ICD-10-CM

## 2020-01-01 DIAGNOSIS — N18.31 CHRONIC KIDNEY DISEASE (CKD) STAGE G3A/A3, MODERATELY DECREASED GLOMERULAR FILTRATION RATE (GFR) BETWEEN 45-59 ML/MIN/1.73 SQUARE METER AND ALBUMINURIA CREATININE RATIO GREATER THAN 300 MG/G (HCC): ICD-10-CM

## 2020-01-01 DIAGNOSIS — I65.23 CAROTID STENOSIS, ASYMPTOMATIC, BILATERAL: ICD-10-CM

## 2020-01-01 DIAGNOSIS — Z12.5 SCREENING FOR MALIGNANT NEOPLASM OF PROSTATE: ICD-10-CM

## 2020-01-01 DIAGNOSIS — E11.29 CONTROLLED TYPE 2 DIABETES MELLITUS WITH MICROALBUMINURIA, WITHOUT LONG-TERM CURRENT USE OF INSULIN (HCC): ICD-10-CM

## 2020-01-01 LAB
ALBUMIN SERPL ELPH-MCNC: 3.3 G/DL (ref 2.9–4.4)
ALBUMIN SERPL-MCNC: 3.1 G/DL (ref 3.4–5)
ALBUMIN/GLOB SERPL: 0.8 {RATIO} (ref 0.8–1.7)
ALBUMIN/GLOB SERPL: 1 {RATIO} (ref 0.7–1.7)
ALP SERPL-CCNC: 109 U/L (ref 45–117)
ALPHA1 GLOB SERPL ELPH-MCNC: 0.3 G/DL (ref 0–0.4)
ALPHA2 GLOB SERPL ELPH-MCNC: 1.1 G/DL (ref 0.4–1)
ALT SERPL-CCNC: 12 U/L (ref 16–61)
ANION GAP SERPL CALC-SCNC: 4 MMOL/L (ref 3–18)
AST SERPL-CCNC: 12 U/L (ref 10–38)
B-GLOBULIN SERPL ELPH-MCNC: 0.8 G/DL (ref 0.7–1.3)
BASOPHILS # BLD: 0 K/UL (ref 0–0.1)
BASOPHILS NFR BLD: 0 % (ref 0–2)
BILIRUB SERPL-MCNC: 0.1 MG/DL (ref 0.2–1)
BUN SERPL-MCNC: 15 MG/DL (ref 8–27)
BUN SERPL-MCNC: 18 MG/DL (ref 7–18)
BUN/CREAT SERPL: 11 (ref 10–24)
BUN/CREAT SERPL: 12 (ref 12–20)
CALCIUM SERPL-MCNC: 8.3 MG/DL (ref 8.5–10.1)
CALCIUM SERPL-MCNC: 9 MG/DL (ref 8.6–10.2)
CHLORIDE SERPL-SCNC: 102 MMOL/L (ref 96–106)
CHLORIDE SERPL-SCNC: 111 MMOL/L (ref 100–111)
CO2 SERPL-SCNC: 21 MMOL/L (ref 20–29)
CO2 SERPL-SCNC: 28 MMOL/L (ref 21–32)
CREAT SERPL-MCNC: 1.32 MG/DL (ref 0.76–1.27)
CREAT SERPL-MCNC: 1.49 MG/DL (ref 0.6–1.3)
DIFFERENTIAL METHOD BLD: ABNORMAL
EOSINOPHIL # BLD: 0.2 K/UL (ref 0–0.4)
EOSINOPHIL NFR BLD: 3 % (ref 0–5)
ERYTHROCYTE [DISTWIDTH] IN BLOOD BY AUTOMATED COUNT: 14.1 % (ref 11.6–15.4)
ERYTHROCYTE [DISTWIDTH] IN BLOOD BY AUTOMATED COUNT: 16.6 % (ref 11.6–14.5)
ERYTHROCYTE [SEDIMENTATION RATE] IN BLOOD: 41 MM/HR (ref 0–20)
FERRITIN SERPL-MCNC: 14 NG/ML (ref 8–388)
FOLATE SERPL-MCNC: 5.4 NG/ML (ref 3.1–17.5)
GAMMA GLOB SERPL ELPH-MCNC: 1.1 G/DL (ref 0.4–1.8)
GLOBULIN SER CALC-MCNC: 3.2 G/DL (ref 2.2–3.9)
GLOBULIN SER CALC-MCNC: 3.9 G/DL (ref 2–4)
GLUCOSE SERPL-MCNC: 80 MG/DL (ref 74–99)
GLUCOSE SERPL-MCNC: 86 MG/DL (ref 65–99)
HAPTOGLOB SERPL-MCNC: 311 MG/DL (ref 30–200)
HBA1C MFR BLD: 5.6 % (ref 4.8–5.6)
HCT VFR BLD AUTO: 29.1 % (ref 37.5–51)
HCT VFR BLD AUTO: 30.1 % (ref 36–48)
HGB BLD-MCNC: 9.2 G/DL (ref 13–17.7)
HGB BLD-MCNC: 9.4 G/DL (ref 13–16)
INTERPRETATION: NORMAL
IRON SATN MFR SERPL: 9 % (ref 20–50)
IRON SERPL-MCNC: 26 UG/DL (ref 50–175)
LDH SERPL L TO P-CCNC: 159 U/L (ref 81–234)
LYMPHOCYTES # BLD: 2.2 K/UL (ref 0.9–3.6)
LYMPHOCYTES NFR BLD: 26 % (ref 21–52)
M PROTEIN SERPL ELPH-MCNC: ABNORMAL G/DL
MCH RBC QN AUTO: 25.8 PG (ref 24–34)
MCH RBC QN AUTO: 26.5 PG (ref 26.6–33)
MCHC RBC AUTO-ENTMCNC: 31.2 G/DL (ref 31–37)
MCHC RBC AUTO-ENTMCNC: 31.6 G/DL (ref 31.5–35.7)
MCV RBC AUTO: 82.7 FL (ref 74–97)
MCV RBC AUTO: 84 FL (ref 79–97)
MONOCYTES # BLD: 0.8 K/UL (ref 0.05–1.2)
MONOCYTES NFR BLD: 9 % (ref 3–10)
NEUTS SEG # BLD: 5.3 K/UL (ref 1.8–8)
NEUTS SEG NFR BLD: 62 % (ref 40–73)
PLATELET # BLD AUTO: 246 K/UL (ref 135–420)
PLATELET # BLD AUTO: 259 X10E3/UL (ref 150–450)
PMV BLD AUTO: 9.8 FL (ref 9.2–11.8)
POTASSIUM SERPL-SCNC: 4.6 MMOL/L (ref 3.5–5.5)
POTASSIUM SERPL-SCNC: 5.2 MMOL/L (ref 3.5–5.2)
PROT PATTERN SERPL ELPH-IMP: ABNORMAL
PROT SERPL-MCNC: 6.5 G/DL (ref 6–8.5)
PROT SERPL-MCNC: 7 G/DL (ref 6.4–8.2)
PSA SERPL-MCNC: 1.3 NG/ML (ref 0–4)
RBC # BLD AUTO: 3.47 X10E6/UL (ref 4.14–5.8)
RBC # BLD AUTO: 3.64 M/UL (ref 4.7–5.5)
RETICS/RBC NFR AUTO: 1 % (ref 0.5–2.3)
SODIUM SERPL-SCNC: 136 MMOL/L (ref 134–144)
SODIUM SERPL-SCNC: 143 MMOL/L (ref 136–145)
TIBC SERPL-MCNC: 292 UG/DL (ref 250–450)
VIT B12 SERPL-MCNC: 344 PG/ML (ref 211–911)
WBC # BLD AUTO: 7.9 X10E3/UL (ref 3.4–10.8)
WBC # BLD AUTO: 8.5 K/UL (ref 4.6–13.2)

## 2020-01-01 PROCEDURE — G8420 CALC BMI NORM PARAMETERS: HCPCS | Performed by: PHYSICIAN ASSISTANT

## 2020-01-01 PROCEDURE — G8536 NO DOC ELDER MAL SCRN: HCPCS | Performed by: PHYSICIAN ASSISTANT

## 2020-01-01 PROCEDURE — 85025 COMPLETE CBC W/AUTO DIFF WBC: CPT

## 2020-01-01 PROCEDURE — 80053 COMPREHEN METABOLIC PANEL: CPT

## 2020-01-01 PROCEDURE — 99204 OFFICE O/P NEW MOD 45 MIN: CPT | Performed by: INTERNAL MEDICINE

## 2020-01-01 PROCEDURE — 83010 ASSAY OF HAPTOGLOBIN QUANT: CPT

## 2020-01-01 PROCEDURE — 82607 VITAMIN B-12: CPT

## 2020-01-01 PROCEDURE — 85045 AUTOMATED RETICULOCYTE COUNT: CPT

## 2020-01-01 PROCEDURE — G8754 DIAS BP LESS 90: HCPCS | Performed by: PHYSICIAN ASSISTANT

## 2020-01-01 PROCEDURE — 84165 PROTEIN E-PHORESIS SERUM: CPT

## 2020-01-01 PROCEDURE — 96365 THER/PROPH/DIAG IV INF INIT: CPT

## 2020-01-01 PROCEDURE — 99214 OFFICE O/P EST MOD 30 MIN: CPT | Performed by: INTERNAL MEDICINE

## 2020-01-01 PROCEDURE — 74011250636 HC RX REV CODE- 250/636: Performed by: INTERNAL MEDICINE

## 2020-01-01 PROCEDURE — 83540 ASSAY OF IRON: CPT

## 2020-01-01 PROCEDURE — 3017F COLORECTAL CA SCREEN DOC REV: CPT | Performed by: PHYSICIAN ASSISTANT

## 2020-01-01 PROCEDURE — 82728 ASSAY OF FERRITIN: CPT

## 2020-01-01 PROCEDURE — G8432 DEP SCR NOT DOC, RNG: HCPCS | Performed by: PHYSICIAN ASSISTANT

## 2020-01-01 PROCEDURE — 36415 COLL VENOUS BLD VENIPUNCTURE: CPT

## 2020-01-01 PROCEDURE — 83615 LACTATE (LD) (LDH) ENZYME: CPT

## 2020-01-01 PROCEDURE — 99213 OFFICE O/P EST LOW 20 MIN: CPT | Performed by: PHYSICIAN ASSISTANT

## 2020-01-01 PROCEDURE — 85652 RBC SED RATE AUTOMATED: CPT

## 2020-01-01 PROCEDURE — G8428 CUR MEDS NOT DOCUMENT: HCPCS | Performed by: PHYSICIAN ASSISTANT

## 2020-01-01 PROCEDURE — 1101F PT FALLS ASSESS-DOCD LE1/YR: CPT | Performed by: PHYSICIAN ASSISTANT

## 2020-01-01 PROCEDURE — G8752 SYS BP LESS 140: HCPCS | Performed by: PHYSICIAN ASSISTANT

## 2020-01-01 RX ORDER — HEPARIN 100 UNIT/ML
300-500 SYRINGE INTRAVENOUS AS NEEDED
Status: CANCELLED
Start: 2020-01-01

## 2020-01-01 RX ORDER — ACETAMINOPHEN 325 MG/1
650 TABLET ORAL AS NEEDED
Status: CANCELLED
Start: 2020-01-01

## 2020-01-01 RX ORDER — SODIUM CHLORIDE 0.9 % (FLUSH) 0.9 %
10 SYRINGE (ML) INJECTION AS NEEDED
Status: CANCELLED | OUTPATIENT
Start: 2020-01-01

## 2020-01-01 RX ORDER — DIPHENHYDRAMINE HYDROCHLORIDE 50 MG/ML
25 INJECTION, SOLUTION INTRAMUSCULAR; INTRAVENOUS AS NEEDED
Status: CANCELLED
Start: 2020-01-01

## 2020-01-01 RX ORDER — EPINEPHRINE 1 MG/ML
0.3 INJECTION, SOLUTION, CONCENTRATE INTRAVENOUS AS NEEDED
Status: CANCELLED | OUTPATIENT
Start: 2020-01-01

## 2020-01-01 RX ORDER — ALBUTEROL SULFATE 0.83 MG/ML
2.5 SOLUTION RESPIRATORY (INHALATION) AS NEEDED
Status: CANCELLED
Start: 2020-01-01

## 2020-01-01 RX ORDER — LEVETIRACETAM 750 MG/1
TABLET ORAL
Qty: 180 TAB | Refills: 3 | Status: SHIPPED | OUTPATIENT
Start: 2020-01-01 | End: 2021-01-01

## 2020-01-01 RX ORDER — DIAZEPAM 5 MG/1
TABLET ORAL
Qty: 60 TAB | Refills: 0 | Status: SHIPPED | OUTPATIENT
Start: 2020-01-01 | End: 2020-01-01

## 2020-01-01 RX ORDER — SODIUM CHLORIDE 0.9 % (FLUSH) 0.9 %
10 SYRINGE (ML) INJECTION AS NEEDED
Status: DISPENSED | OUTPATIENT
Start: 2020-01-01 | End: 2020-01-01

## 2020-01-01 RX ORDER — SODIUM CHLORIDE 9 MG/ML
10 INJECTION INTRAMUSCULAR; INTRAVENOUS; SUBCUTANEOUS AS NEEDED
Status: CANCELLED | OUTPATIENT
Start: 2020-01-01

## 2020-01-01 RX ORDER — LANSOPRAZOLE 30 MG/1
CAPSULE, DELAYED RELEASE ORAL
Qty: 90 CAP | Refills: 3 | Status: SHIPPED | OUTPATIENT
Start: 2020-01-01

## 2020-01-01 RX ORDER — SODIUM CHLORIDE 9 MG/ML
25 INJECTION, SOLUTION INTRAVENOUS CONTINUOUS
Status: DISPENSED | OUTPATIENT
Start: 2020-01-01 | End: 2020-01-01

## 2020-01-01 RX ORDER — GABAPENTIN 600 MG/1
TABLET ORAL
Qty: 270 TAB | Refills: 1 | Status: SHIPPED | OUTPATIENT
Start: 2020-01-01 | End: 2021-01-01

## 2020-01-01 RX ORDER — DIPHENHYDRAMINE HYDROCHLORIDE 50 MG/ML
50 INJECTION, SOLUTION INTRAMUSCULAR; INTRAVENOUS AS NEEDED
Status: CANCELLED
Start: 2020-01-01

## 2020-01-01 RX ORDER — HYDROCORTISONE SODIUM SUCCINATE 100 MG/2ML
100 INJECTION, POWDER, FOR SOLUTION INTRAMUSCULAR; INTRAVENOUS AS NEEDED
Status: CANCELLED | OUTPATIENT
Start: 2020-01-01

## 2020-01-01 RX ORDER — CARVEDILOL 12.5 MG/1
12.5 TABLET ORAL 2 TIMES DAILY
Qty: 180 TAB | Refills: 3 | Status: SHIPPED | OUTPATIENT
Start: 2020-01-01 | End: 2021-01-01

## 2020-01-01 RX ORDER — GABAPENTIN 600 MG/1
TABLET ORAL
Qty: 270 TAB | Refills: 1 | Status: SHIPPED | OUTPATIENT
Start: 2020-01-01 | End: 2020-01-01

## 2020-01-01 RX ORDER — SODIUM CHLORIDE 9 MG/ML
25 INJECTION, SOLUTION INTRAVENOUS CONTINUOUS
Status: CANCELLED | OUTPATIENT
Start: 2020-01-01

## 2020-01-01 RX ORDER — ONDANSETRON 2 MG/ML
8 INJECTION INTRAMUSCULAR; INTRAVENOUS AS NEEDED
Status: CANCELLED | OUTPATIENT
Start: 2020-01-01

## 2020-01-01 RX ORDER — METFORMIN HYDROCHLORIDE 1000 MG/1
TABLET ORAL
Qty: 180 TAB | Refills: 3 | Status: SHIPPED | OUTPATIENT
Start: 2020-01-01 | End: 2021-01-01

## 2020-01-01 RX ORDER — DIAZEPAM 5 MG/1
TABLET ORAL
Qty: 60 TAB | Refills: 0 | Status: SHIPPED | OUTPATIENT
Start: 2020-01-01 | End: 2021-01-01

## 2020-01-01 RX ADMIN — Medication 10 ML: at 08:25

## 2020-01-01 RX ADMIN — Medication 10 ML: at 08:24

## 2020-01-01 RX ADMIN — Medication 10 ML: at 08:54

## 2020-01-01 RX ADMIN — FERRIC CARBOXYMALTOSE INJECTION 750 MG: 50 INJECTION, SOLUTION INTRAVENOUS at 08:28

## 2020-01-01 RX ADMIN — SODIUM CHLORIDE 25 ML/HR: 9 INJECTION, SOLUTION INTRAVENOUS at 08:28

## 2020-01-01 RX ADMIN — SODIUM CHLORIDE 25 ML/HR: 9 INJECTION, SOLUTION INTRAVENOUS at 08:29

## 2020-01-01 RX ADMIN — FERRIC CARBOXYMALTOSE INJECTION 750 MG: 50 INJECTION, SOLUTION INTRAVENOUS at 08:32

## 2020-01-22 ENCOUNTER — APPOINTMENT (OUTPATIENT)
Dept: INTERNAL MEDICINE CLINIC | Age: 70
End: 2020-01-22

## 2020-01-22 ENCOUNTER — HOSPITAL ENCOUNTER (OUTPATIENT)
Dept: LAB | Age: 70
Discharge: HOME | End: 2020-01-22
Payer: MEDICARE

## 2020-01-22 LAB
ANION GAP SERPL CALC-SCNC: 4 MMOL/L (ref 3–18)
BUN SERPL-MCNC: 17 MG/DL (ref 7–18)
BUN/CREAT SERPL: 11 (ref 12–20)
CALCIUM SERPL-MCNC: 9.1 MG/DL (ref 8.5–10.1)
CHLORIDE SERPL-SCNC: 107 MMOL/L (ref 100–111)
CHOLEST SERPL-MCNC: 113 MG/DL
CO2 SERPL-SCNC: 24 MMOL/L (ref 21–32)
CREAT SERPL-MCNC: 1.52 MG/DL (ref 0.6–1.3)
CREAT UR-MCNC: 32 MG/DL (ref 30–125)
EST. AVERAGE GLUCOSE BLD GHB EST-MCNC: 108 MG/DL
GLUCOSE SERPL-MCNC: 76 MG/DL (ref 74–99)
HBA1C MFR BLD: 5.4 % (ref 4.2–5.6)
HDLC SERPL-MCNC: 33 MG/DL (ref 40–60)
HDLC SERPL: 3.4 {RATIO} (ref 0–5)
LDLC SERPL CALC-MCNC: 44.6 MG/DL (ref 0–100)
LIPID PROFILE,FLP: ABNORMAL
MICROALBUMIN UR-MCNC: 1.7 MG/DL (ref 0–3)
MICROALBUMIN/CREAT UR-RTO: 53 MG/G (ref 0–30)
POTASSIUM SERPL-SCNC: 5.3 MMOL/L (ref 3.5–5.5)
SODIUM SERPL-SCNC: 135 MMOL/L (ref 136–145)
TRIGL SERPL-MCNC: 177 MG/DL (ref ?–150)
VLDLC SERPL CALC-MCNC: 35.4 MG/DL

## 2020-01-22 PROCEDURE — 36415 COLL VENOUS BLD VENIPUNCTURE: CPT

## 2020-01-22 PROCEDURE — 83036 HEMOGLOBIN GLYCOSYLATED A1C: CPT

## 2020-01-22 PROCEDURE — 82043 UR ALBUMIN QUANTITATIVE: CPT

## 2020-01-22 PROCEDURE — 80061 LIPID PANEL: CPT

## 2020-01-22 PROCEDURE — 80048 BASIC METABOLIC PNL TOTAL CA: CPT

## 2020-01-25 NOTE — PROGRESS NOTES
71 y.o. WHITE OR  male who presents for evaluation. Wife is present for the evaluation as per usual 
 
No seizure since the last visit No cardiovascular complaints. He is not exercising but remains active with chores and lawn work. No claudication and he is seeing vascular once a year now No polyuria, polydipsia, nocturia, vision change or neurologic complaints. Not checking sugars regularly. Weight is stable. Sees Dr. Fariha Owen who told him he has cataracts and may need surgery in the near future No gi or gu issues He is known to have c spine disease from prior eval by Dr Shawnee Schulz years ago. Has occasional  Neck and base of skull pain especially when he bends his neck forward. Not interested in doing anything at this time as not really debilitating currently LAST MEDICARE WELLNESS EXAM: 7/13/16, 7/20/17, 10/3/18, 1/29/20 Past Medical History:  
Diagnosis Date  Anemia   
 fe def w gi w/u Dr Elana Lee  Basal cell carcinoma   
 s/p resection Dr. Florence Mas  Cervical spine disease 2000s Dr Anastasia Franco  Chronic back pain inoperable Dr. Juanis Rasmussen and Dr. Marvin Araujo  Chronic kidney disease 04/2017  
 saw Dr Isabelle Narayan  Colon adenoma   
 and diverticulosis Dr Elana Lee 2014  Diabetes mellitus ([address] [id number]  
 microalbuminuria 5/14  DJD (degenerative joint disease)  Dyshidrotic eczema  Dyslipidemia  ED (erectile dysfunction)  FHx: heart disease  Gastritis 2014 Dr Elana Lee neg h pylori  GERD (gastroesophageal reflux disease) 2014  
 on EGD Dr Elana Lee  Hypertension  Left renal artery stenosis ([address] 11/30/2017  
 f/u duplex 11/18 by vasc was negative  Meniere's disease   
 s/p surgery Dr Brandi Steiner  Overweight (BMI 25.0-29.9) 11/30/2017  PAD (peripheral artery disease) ([address] Dr Sissy Tafoya  Rosacea  Seizure ([facility] [facility] 03/2019  
 possible Dr Mcdonald Hodgkin at [facility]; mri neg, EEG neg, on keppra  Syncope 02/2018  
 probably vasovagal [facility]  
 
 Past Surgical History:  
Procedure Laterality Date  CARDIAC SURG PROCEDURE UNLIST  10/10 NST neg EF 68%  CARDIAC SURG PROCEDURE UNLIST  2018  
 echo nl LV, ef 62%, mild mr/tr/pr, rvp 33 [facility]  
 HX CHOLECYSTECTOMY   Dr. Corbin Johnson HX COLONOSCOPY    
 adenoma and divertics Dr Toney Parada  HX GI  2014  
 pill camera non bleeding ileal AVM  HX TONSILLECTOMY  NEUROLOGICAL PROCEDURE UNLISTED  2019  
 mri head contrast showed no acute lesions  SINUS SURGERY PROC UNLISTED    
 left ear surgery Dr Odalis Barker for meniere's  VASCULAR SURGERY PROCEDURE UNLIST  10/10 [address] left Graft Surgery  Dr. Prieto Victoria  VASCULAR SURGERY PROCEDURE UNLIST  2019  
 carotids showed 01%OGRX, <50%LICA Social History Socioeconomic History  Marital status:  Spouse name: Not on file  Number of children: 0  
 Years of education: Not on file  Highest education level: Not on file Occupational History  Occupation: [employer] [place] [address] [employer] Social Needs  Financial resource strain: Not on file  Food insecurity:  
  Worry: Not on file Inability: Not on file  Transportation needs:  
  Medical: Not on file Non-medical: Not on file Tobacco Use  Smoking status: Former Smoker Packs/day: 1.00 Years: 40.00 Pack years: 40.00 Last attempt to quit: 10/22/2011 Years since quittin.2  Smokeless tobacco: Never Used Substance and Sexual Activity  Alcohol use: No  
 Drug use: No  
 Sexual activity: Not on file Lifestyle  Physical activity:  
  Days per week: Not on file Minutes per session: Not on file  Stress: Not on file Relationships  Social connections:  
  Talks on phone: Not on file Gets together: Not on file Attends Buddhism service: Not on file Active member of club or organization: Not on file Attends meetings of clubs or organizations: Not on file Relationship status: Not on file  Intimate partner violence:  
  Fear of current or ex partner: Not on file Emotionally abused: Not on file Physically abused: Not on file Forced sexual activity: Not on file Other Topics Concern  Not on file Social History Narrative  Not on file Current Outpatient Medications Medication Sig  
 gabapentin (NEURONTIN) 600 mg tablet Take 1 Tab by mouth three (3) times daily.  lansoprazole (PREVACID) 30 mg capsule TAKE 1 CAPSULE BY MOUTH ONCE DAILY BEFORE BREAKFAST  diazePAM (VALIUM) 5 mg tablet TAKE 1 TABLET BY MOUTH 2 TIMES A DAY (NOT TO EXCEED 2 TABS PER DAY)  atorvastatin (LIPITOR) 40 mg tablet TAKE 1 TABLET BY MOUTH ONCE DAILY  metFORMIN (GLUCOPHAGE) 1,000 mg tablet Take 1 Tab by mouth two (2) times daily (with meals).  levETIRAcetam (KEPPRA) 750 mg tablet Take 1 Tab by mouth two (2) times a day.  benazepril (LOTENSIN) 20 mg tablet Take 1 Tab by mouth daily.  clopidogrel (PLAVIX) 75 mg tab TAKE 1 TABLET BY MOUTH ONCE DAILY  carvedilol (COREG) 12.5 mg tablet Take 1 Tab by mouth two (2) times a day.  amLODIPine (NORVASC) 10 mg tablet Take 1 Tab by mouth daily.  naloxone (NARCAN) 4 mg/actuation nasal spray Use 1 spray intranasally into 1 nostril. Use a new Narcan nasal spray for subsequent doses and administer into alternating nostrils. May repeat every 2 to 3 minutes as needed.  acetaminophen (TYLENOL) 325 mg tablet Take  by mouth every four (4) hours as needed for Pain.  aspirin 81 mg chewable tablet Take 81 mg by mouth daily.  triamcinolone (ARISTOCORT) 0.5 % topical cream Apply  to affected area two (2) times a day. use thin layer (Patient taking differently: Apply  to affected area two (2) times a day. use thin layer  Indications: applying to left ear) No current facility-administered medications for this visit. No Known Allergies REVIEW OF SYSTEMS: sees Dr. Alberto Amaya, no podiatry, colo 2014 Dr Leona Duval Ophtho  no vision change or eye pain Oral  no mouth pain, tongue or tooth problems Ears  no hearing loss, ear pain, fullness, no swallowing problems Cardiac  no CP, PND, orthopnea, edema, palpitations or syncope Chest  no breast masses Resp  no wheezing, chronic coughing, dyspnea GI  no heartburn, nausea, vomiting, change in bowel habits, bleeding, hemorrhoids Urinary  no dysuria, hematuria, flank pain, urgency, frequency Endo - no polyuria, polydipsia, nocturia, hot flashes Visit Vitals /56 Pulse 74 Temp 97.7 °F (36.5 °C) (Oral) Resp 14 Ht 5' 11\" (1.803 m) Wt 169 lb (76.7 kg) SpO2 98% BMI 23.57 kg/m² A&O x3 Affect is appropriate. Mood stable No apparent distress Anicteric, no JVD, adenopathy or thyromegaly. No carotid bruits or radiated murmur Lungs clear to auscultation, no wheezes or rales Heart showed regular rate and rhythm. No murmur, rubs, gallops Abdomen soft nontender, no hepatosplenomegaly or masses. Ext without c/c/e, 1+m pulses dp/pt LABS From 10/11 showed gluc 99,  cr 0.98, gfr 83, alt 43,  hba1c 5.6, ldl-p 1018, chol 124, tg 223, hdl 37, ldl-c 42, umar 11.2, tsh 2.10,          psa 0.90 From 4/12 showed                  hba1c 5.8, ldl-p 1136, chol 143, tg 188, hdl 38, ldl-c 67, umar 9.5 From 10/12 showed gluc 87,  cr 0.99, gfr 81, alt 15,  hba1c 5.6, ldl-p 500,   chol 109, tg 125, hdl 40, ldl-c 44 From 7/13 showed                  hba1c 5.7,                   chol 136, tg 137, hdl 42, ldl-c 67 From 2/14 showed                  hba1c 6.0,               chol 136, tg 72,   hdl 52, ldl-c 72, umar 347,  wbc 12.4, hb 12.0, plt 240, psa 1.39 From 5/14 showed                  hba1c 6.0,               chol 135, tg 94,   hdl 45, ldl-c 71, umar 469,  wbc 9.5,   hb 11.6, plt 232, psa 1.35 From 5/14 showed                                      fe 25, %sat 7, ferritin 32, b12 699, fol 16.1, spep neg From 6/14 showed   gluc 100, cr 1.33, gfr 54 From 8/14 showed                  hba1c 6.1,               chol 137, tg 93,   hdl 46, ldl-c 98, umar 100 From 2/15 showed   gluc 100, cr 1.50, gfr 46, alt 14, hba1c 5.9,             umar 82.1,  wbc 9.9,   hb 11.2, plt 203 From 9/15 showed   gluc 88,   cr 1.30,    alt 11, hba1c 5.6,    chol 135, tg 123, hdl 43, ldl-c 67, umar 159,   wbc 9.2,   hb 11.8, plt 225 From 3/16 showed   gluc 77,   cr 1.58, gfr 45,   hba1c 6.0,                    hep c neg, na 133 From 7/16 showed   gluc 96,   cr 1.41, gfr 52,   hba1c 5.9,             umar 163,   wbc 9.6,  hb 12.1, plt 281,                    na 131 From 1/17 showed   gluc 79,   cr 1.50, gfr 48 From 7/17 showed   gluc 92,   cr 1.57, gfr 45, alt 6,   hba1c 5.8,   chol 170, tg 135, hdl 44, ldl-c 99, umar 1043 From 11/17 showed      hba1c 5.7,   chol 156, tg 125, hdl 51, ldl-c 80, umar 779,   wbc 10.1, hb 12.0, plt 264 From 2/18 showed   gluc 83,   cr 1.40, gfr 54,                       wbc 6.2,   hb 9.8,   plt 216, fe 66 ferritin 58.6, b12 376 From 3/18 showed   gluc 82,   cr 1.69, gfr 41,   hba1c 5.7,            umar 275 From 10/18 showed gluc 86,   cr 1.51, gfr47,   hba1c 5.7,             chol 149, tg 150, hdl 33, ldl-c 86 From 1/19 showed   gluc 80,   cr 1.43, gfr 50, alt 11, hba1c 5.8,     chol 139, tg 146, hdl 37, ld-c 73,    wbc 11.6, hb 11.3, plt 303, k 5.6 From 3/19 showed   gluc 127, cr 1.40, gfr 54,            wbc 7.3,   hb 9.4, plt 222 From 8/19 showed   gluc 88,   cr 1.48, gfr 48, alt 14,  hba1c 5.9,   chol 118, tg 147, hdl 30, ldl-c 59, umar 760,    wbc 7.2, hb 10.1, plt 238 Results for orders placed or performed during the hospital encounter of 01/22/20 MICROALBUMIN, UR, RAND W/ MICROALB/CREAT RATIO Result Value Ref Range Microalbumin,urine random 1.70 0 - 3.0 MG/DL Creatinine, urine 32.00 30 - 125 mg/dL Microalbumin/Creat ratio (mg/g creat) 53 (H) 0 - 30 mg/g METABOLIC PANEL, BASIC Result Value Ref Range Sodium 135 (L) 136 - 145 mmol/L Potassium 5.3 3.5 - 5.5 mmol/L Chloride 107 100 - 111 mmol/L  
 CO2 24 21 - 32 mmol/L Anion gap 4 3.0 - 18 mmol/L Glucose 76 74 - 99 mg/dL BUN 17 7.0 - 18 MG/DL Creatinine 1.52 (H) 0.6 - 1.3 MG/DL  
 BUN/Creatinine ratio 11 (L) 12 - 20 GFR est AA 55 (L) >60 ml/min/1.73m2 GFR est non-AA 46 (L) >60 ml/min/1.73m2 Calcium 9.1 8.5 - 10.1 MG/DL  
LIPID PANEL Result Value Ref Range LIPID PROFILE Cholesterol, total 113 <200 MG/DL Triglyceride 177 (H) <150 MG/DL  
 HDL Cholesterol 33 (L) 40 - 60 MG/DL  
 LDL, calculated 44.6 0 - 100 MG/DL VLDL, calculated 35.4 MG/DL  
 CHOL/HDL Ratio 3.4 0 - 5.0 HEMOGLOBIN A1C WITH EAG Result Value Ref Range Hemoglobin A1c 5.4 4.2 - 5.6 % Est. average glucose 108 mg/dL Patient Active Problem List  
Diagnosis Code  Hyperlipidemia E78.5  Chronic back pain inoperable Dr. Nayan Ortega and Dr. Nan Escalante M54.9, A53.09  
 Peripheral vascular disease s/p left iliac bpg Dr. Artur Spear 10/10 I73.9  Erectile dysfunction N52.9  Arthritis, degenerative M19.90  
 Primary hypertension I10  
 GERD without esophagitis K21.9  Controlled type 2 diabetes mellitus with albuminuria E11.29, R80.9  Advance directive in chart Z78.9  Chronic kidney disease (CKD) stage G3a/A3 N18.3  Type 2 diabetes mellitus with diabetic neuropathy (HCC) E11.40 Assessment and plan: 1. Ménière's. Followup Dr. Radha Valdez prn 2. Diabetes w microalbuminuria. Well-controlled on metformin alone, close watch on renal fxn 3. Hyperlipidemia. Continue current regimen. 4. Chronic back problems. Continue current. Declined w/u or tx of c spine issues 5. Vascular. F/U Dr. Artur Spear as directed 6. CKD. F/U Dr Anel Warner 7. HTN. Continue current regimen. 8. Colon adenoma. Declined referral despite long discussion 9. Possible seizures. continue [facility] 7/20 Above conditions discussed at length and patient vocalized understanding. All questions answered to patient satisfaction

## 2020-01-25 NOTE — PROGRESS NOTES
This is a subsequent Preventive Physical Examination I have reviewed the patient's medical history in detail and updated the computerized patient record. History Past Medical History:  
Diagnosis Date  Anemia   
 fe def w gi w/u Dr Rylie Wan  Basal cell carcinoma   
 s/p resection Dr. Lincoln Parker  Cervical spine disease 2000s Dr Yovanny Mosqueda  Chronic back pain inoperable Dr. Courtney Del Rio and Dr. Kevin Arnold  Chronic kidney disease 04/2017  
 saw Dr Cynthia Mehta  Colon adenoma   
 and diverticulosis Dr Rylie Wan 2014  Diabetes mellitus ([address] [id number]  
 microalbuminuria 5/14  DJD (degenerative joint disease)  Dyshidrotic eczema  Dyslipidemia  ED (erectile dysfunction)  FHx: heart disease  Gastritis 2014 Dr Rylie Wan neg h pylori  GERD (gastroesophageal reflux disease) 2014  
 on EGD Dr Rylie Wan  Hypertension  Left renal artery stenosis ([facility] [address] 11/30/2017  
 f/u duplex 11/18 by vasc was negative  Meniere's disease   
 s/p surgery Dr Shannan Gilbert  Overweight (BMI 25.0-29.9) 11/30/2017  PAD (peripheral artery disease) ([facility] [address] Dr Abimbola Vincent  Rosacea  Seizure ([facility] [address] 03/2019  
 possible Dr Kirti Hilario at [facility]; mri neg, EEG neg, on keppra  Syncope 02/2018  
 probably vasovagal [facility] Past Surgical History:  
Procedure Laterality Date  CARDIAC SURG PROCEDURE UNLIST  10/10 NST neg EF 68%  CARDIAC SURG PROCEDURE UNLIST  02/2018  
 echo nl LV, ef 62%, mild mr/tr/pr, rvp 33 [facility]  
 HX CHOLECYSTECTOMY  2011 Dr. Tucker Mendiola HX COLONOSCOPY  2014  
 adenoma and divertics Dr Rylie Wan  HX GI  2014  
 pill camera non bleeding ileal AVM  HX TONSILLECTOMY  NEUROLOGICAL PROCEDURE UNLISTED  03/2019  
 mri head contrast showed no acute lesions  SINUS SURGERY PROC UNLISTED    
 left ear surgery Dr Shannan Gilbert for meniere's  VASCULAR SURGERY PROCEDURE UNLIST  10/10 [address] left Graft Surgery  Dr. Abimbola Vincent  VASCULAR SURGERY PROCEDURE UNLIST  03/2019 carotids showed 03%FBLH, <50%LICA Current Outpatient Medications Medication Sig Dispense Refill  gabapentin (NEURONTIN) 600 mg tablet Take 1 Tab by mouth three (3) times daily. 270 Tab 1  
 lansoprazole (PREVACID) 30 mg capsule TAKE 1 CAPSULE BY MOUTH ONCE DAILY BEFORE BREAKFAST 90 Cap 3  
 diazePAM (VALIUM) 5 mg tablet TAKE 1 TABLET BY MOUTH 2 TIMES A DAY (NOT TO EXCEED 2 TABS PER DAY) 60 Tab 0  
 atorvastatin (LIPITOR) 40 mg tablet TAKE 1 TABLET BY MOUTH ONCE DAILY 30 Tab 11  
 metFORMIN (GLUCOPHAGE) 1,000 mg tablet Take 1 Tab by mouth two (2) times daily (with meals). 180 Tab 3  
 levETIRAcetam (KEPPRA) 750 mg tablet Take 1 Tab by mouth two (2) times a day. 180 Tab 3  
 benazepril (LOTENSIN) 20 mg tablet Take 1 Tab by mouth daily. 90 Tab 3  clopidogrel (PLAVIX) 75 mg tab TAKE 1 TABLET BY MOUTH ONCE DAILY 90 Tab 3  carvedilol (COREG) 12.5 mg tablet Take 1 Tab by mouth two (2) times a day. 180 Tab 3  
 amLODIPine (NORVASC) 10 mg tablet Take 1 Tab by mouth daily. 90 Tab 3  
 naloxone (NARCAN) 4 mg/actuation nasal spray Use 1 spray intranasally into 1 nostril. Use a new Narcan nasal spray for subsequent doses and administer into alternating nostrils. May repeat every 2 to 3 minutes as needed. 1 Each 1  
 acetaminophen (TYLENOL) 325 mg tablet Take  by mouth every four (4) hours as needed for Pain.  aspirin 81 mg chewable tablet Take 81 mg by mouth daily.  triamcinolone (ARISTOCORT) 0.5 % topical cream Apply  to affected area two (2) times a day. use thin layer (Patient taking differently: Apply  to affected area two (2) times a day. use thin layer  Indications: applying to left ear) 60 g 3 No Known Allergies Family History Problem Relation Age of Onset  Heart Disease Mother  Stroke Father Social History Tobacco Use  Smoking status: Former Smoker Packs/day: 1.00 Years: 40.00 Pack years: 40.00 Last attempt to quit: 10/22/2011 Years since quittin.2  Smokeless tobacco: Never Used Substance Use Topics  Alcohol use: No  
 
Diet, Lifestyle: generally follows a low fat low cholesterol diet, follows a diabetic diet regularly, sedentary Exercise level: not active Depression Risk Screen 3 most recent PHQ Screens 2020 Little interest or pleasure in doing things Not at all Feeling down, depressed, irritable, or hopeless Not at all Total Score PHQ 2 0 Immunization History Administered Date(s) Administered  (RETIRED) Pneumococcal Vaccine (Unspecified Type) 2010  Influenza High Dose Vaccine PF 2017, 2017  Influenza Vaccine 10/01/2012  Influenza Vaccine (Tri) Adjuvanted 10/03/2018, 2020  Influenza Vaccine Split 10/14/2011  Pneumococcal Conjugate (PCV-13) 2016  Pneumococcal Polysaccharide (PPSV-23) 2018  Tdap 2014  Zoster 2010 SCREENINGS Colonoscopy last done  Dr Waqar Garcia Alcohol Risk Screen On any occasion during the past 3 months, have you had more than 4 drinks containing alcohol? No 
 
Do you average more than 14 drinks per week? No 
 
Functional Ability and Level of Safety Hearing Loss  
mild Activities of Daily Living Self-care Fall Risk Screen Fall Risk Assessment, last 12 mths 2020 Able to walk? Yes Fall in past 12 months? No  
Fall with injury? -  
Number of falls in past 12 months - Fall Risk Score -  
 
Abuse Screen Patient is not abused Review of Systems A comprehensive review of systems was negative except for that written in the HPI. Physical Examination Visit Vitals /56 Pulse 74 Temp 97.7 °F (36.5 °C) (Oral) Resp 14 Ht 5' 11\" (1.803 m) Wt 169 lb (76.7 kg) SpO2 98% BMI 23.57 kg/m² Patient Care Team: 
Miguel Mckenzie MD as PCP - General (Internal Medicine) Miguel Mckenzie MD as PCP - [facility] Empaneled Provider MICK Dinh (Vascular Surgery) Zion Trujillo MD (Ophthalmology) Bri Shen MD (Neurology) End-of-life planning Advanced Directive discussed and documented: YES Assessment/Plan Education and counseling provided: 
Are appropriate based on today's review and evaluation End-of-Life planning (with patient's consent) Pneumococcal Vaccine Colorectal cancer screening tests Cardiovascular screening blood test 
Diabetes screening test  
 
  ICD-10-CM ICD-9-CM 1. Medicare annual wellness visit, subsequent Z00.00 V70.0 2. Encounter for immunization Z23 V03.89 INFLUENZA VACCINE INACTIVATED (IIV), SUBUNIT, ADJUVANTED, IM  
   ADMIN INFLUENZA VIRUS VAC 3. Type 2 diabetes mellitus with diabetic neuropathy, without long-term current use of insulin (HCC) M72.52 295.39 METABOLIC PANEL, BASIC  
  357.2 HEMOGLOBIN A1C W/O EAG  
   CBC W/O DIFF 4. Peripheral vascular disease (HCC) I73.9 443.9 5. Advanced directives, counseling/discussion Z71.89 V65.49 ADVANCE CARE PLANNING FIRST 30 MINS 6. Screening for alcoholism Z13.39 V79.1 CT ANNUAL ALCOHOL SCREEN 15 MIN  
7. Screening for depression Z13.31 V79.0 [address] 8. Hyperlipidemia, unspecified hyperlipidemia type E78.5 272.4 9. GERD without esophagitis K21.9 530.81   
10. Primary hypertension I10 401.9 11. Controlled type 2 diabetes mellitus with albuminuria E11.29 250.40   
 R80.9 791.0 12. Chronic kidney disease (CKD) stage G3a/A3 N18.3 585.3 13. Screening for malignant neoplasm of prostate Z12.5 V76.44 PSA SCREENING (SCREENING) current treatment plan is effective 
lab results and schedule of future lab studies reviewed with patient 
cardiovascular risk and specific lipid/LDL goals reviewed. End of life discussion undertaken. Has medical directive in place Flu high dose given 
shingrix advocated Colonoscopy due but he declined to go at this time

## 2020-01-29 ENCOUNTER — OFFICE VISIT (OUTPATIENT)
Dept: INTERNAL MEDICINE CLINIC | Age: 70
End: 2020-01-29

## 2020-01-29 VITALS
DIASTOLIC BLOOD PRESSURE: 56 MMHG | SYSTOLIC BLOOD PRESSURE: 143 MMHG | HEART RATE: 74 BPM | TEMPERATURE: 97.7 F | WEIGHT: 169 LBS | RESPIRATION RATE: 14 BRPM | BODY MASS INDEX: 23.66 KG/M2 | OXYGEN SATURATION: 98 % | HEIGHT: 71 IN

## 2020-01-29 DIAGNOSIS — Z13.39 SCREENING FOR ALCOHOLISM: ICD-10-CM

## 2020-01-29 DIAGNOSIS — Z00.00 MEDICARE ANNUAL WELLNESS VISIT, SUBSEQUENT: Primary | ICD-10-CM

## 2020-01-29 DIAGNOSIS — E78.5 HYPERLIPIDEMIA, UNSPECIFIED HYPERLIPIDEMIA TYPE: ICD-10-CM

## 2020-01-29 DIAGNOSIS — I10 PRIMARY HYPERTENSION: ICD-10-CM

## 2020-01-29 DIAGNOSIS — N18.31 CHRONIC KIDNEY DISEASE (CKD) STAGE G3A/A3, MODERATELY DECREASED GLOMERULAR FILTRATION RATE (GFR) BETWEEN 45-59 ML/MIN/1.73 SQUARE METER AND ALBUMINURIA CREATININE RATIO GREATER THAN 300 MG/G (HCC): ICD-10-CM

## 2020-01-29 DIAGNOSIS — K21.9 GERD WITHOUT ESOPHAGITIS: ICD-10-CM

## 2020-01-29 DIAGNOSIS — Z71.89 ADVANCED DIRECTIVES, COUNSELING/DISCUSSION: ICD-10-CM

## 2020-01-29 DIAGNOSIS — Z12.5 SCREENING FOR MALIGNANT NEOPLASM OF PROSTATE: ICD-10-CM

## 2020-01-29 DIAGNOSIS — E11.40 TYPE 2 DIABETES MELLITUS WITH DIABETIC NEUROPATHY, WITHOUT LONG-TERM CURRENT USE OF INSULIN (HCC): ICD-10-CM

## 2020-01-29 DIAGNOSIS — E11.29 CONTROLLED TYPE 2 DIABETES MELLITUS WITH MICROALBUMINURIA, WITHOUT LONG-TERM CURRENT USE OF INSULIN (HCC): ICD-10-CM

## 2020-01-29 DIAGNOSIS — I73.9 PERIPHERAL VASCULAR DISEASE (HCC): ICD-10-CM

## 2020-01-29 DIAGNOSIS — Z13.31 SCREENING FOR DEPRESSION: ICD-10-CM

## 2020-01-29 DIAGNOSIS — R80.9 CONTROLLED TYPE 2 DIABETES MELLITUS WITH MICROALBUMINURIA, WITHOUT LONG-TERM CURRENT USE OF INSULIN (HCC): ICD-10-CM

## 2020-01-29 DIAGNOSIS — Z23 ENCOUNTER FOR IMMUNIZATION: ICD-10-CM

## 2020-01-29 PROBLEM — R56.9 NEW ONSET SEIZURE (HCC): Status: RESOLVED | Noted: 2019-03-25 | Resolved: 2020-01-29

## 2020-01-29 NOTE — PROGRESS NOTES
Harvinder Oneal presents today for Chief Complaint Patient presents with [employer] Annual Wellness Visit  Diabetes 4 month follow up with labs Depression Screening: 
3 most recent PHQ Screens 1/29/2020 Little interest or pleasure in doing things Not at all Feeling down, depressed, irritable, or hopeless Not at all Total Score PHQ 2 0 Learning Assessment: 
Learning Assessment 11/7/2019 PRIMARY LEARNER Patient HIGHEST LEVEL OF EDUCATION - PRIMARY LEARNER  -  
BARRIERS PRIMARY LEARNER -  
CO-LEARNER CAREGIVER -  
PRIMARY LANGUAGE ENGLISH  
LEARNER PREFERENCE PRIMARY LISTENING  
ANSWERED BY patient RELATIONSHIP SELF Abuse Screening: 
Abuse Screening Questionnaire 1/29/2020 Do you ever feel afraid of your partner? Thom Man Are you in a relationship with someone who physically or mentally threatens you? Thom Man Is it safe for you to go home? Lissett Landis Fall Risk Fall Risk Assessment, last 12 mths 1/29/2020 Able to walk? Yes Fall in past 12 months? No  
Fall with injury? -  
Number of falls in past 12 months - Fall Risk Score - Health Maintenance Due Topic Date Due  Shingrix Vaccine Age 50> (1 of 2) 07/11/2000  AAA Screening 73-67 YO Male Smoking Patients  07/11/2015  
 FOOT EXAM Q1  03/31/2019  COLONOSCOPY  06/18/2019 Coordination of Care: 1. Have you been to the ER, urgent care clinic since your last visit? Hospitalized since your last visit? no 
 
2. Have you seen or consulted any other health care providers outside of the [address] [place] [address] since your last visit? Include any pap smears or colon screening. No 
 
Harvinder Oneal is a 71 y.o. male who presents for routine immunizations. Per verbal order from Rossy Birmingham for influenza (right deltoid) He denies any symptoms , reactions or allergies that would exclude them from being immunized today. Risks and adverse reactions were discussed and the VIS was given to them. All questions were addressed. He was observed for 15 min post injection. There were no reactions observed.  
 
Baoz Catalan LPN

## 2020-01-29 NOTE — PATIENT INSTRUCTIONS
Vaccine Information Statement Influenza (Flu) Vaccine (Inactivated or Recombinant): What You Need to Know Many Vaccine Information Statements are available in Telugu and other languages. See www.immunize.org/vis Hojas de información sobre vacunas están disponibles en español y en muchos otros idiomas. Visite www.immunize.org/vis 1. Why get vaccinated? Influenza vaccine can prevent influenza (flu). Flu is a contagious disease that spreads around the [facility] every year, usually between October and May. Anyone can get the flu, but it is more dangerous for some people. Infants and young children, people 72years of age and older, pregnant women, and people with certain health conditions or a weakened immune system are at greatest risk of flu complications. Pneumonia, bronchitis, sinus infections and ear infections are examples of flu-related complications. If you have a medical condition, such as heart disease, cancer or diabetes, flu can make it worse. Flu can cause fever and chills, sore throat, muscle aches, fatigue, cough, headache, and runny or stuffy nose. Some people may have vomiting and diarrhea, though this is more common in children than adults. Each year thousands of people in the [facility] die from flu, and many more are hospitalized. Flu vaccine prevents millions of illnesses and flu-related visits to the doctor each year. 2. Influenza vaccines CDC recommends everyone 10months of age and older get vaccinated every flu season. Children 6 months through 6years of age may need 2 doses during a single flu season. Everyone else needs only 1 dose each flu season. It takes about 2 weeks for protection to develop after vaccination. There are many flu viruses, and they are always changing. Each year a new flu vaccine is made to protect against three or four viruses that are likely to cause disease in the upcoming flu season.  Even when the vaccine doesnt exactly match these viruses, it may still provide some protection. Influenza vaccine does not cause flu. Influenza vaccine may be given at the same time as other vaccines. 3. Talk with your health care provider Tell your vaccine provider if the person getting the vaccine: 
 Has had an allergic reaction after a previous dose of influenza vaccine, or has any severe, life-threatening allergies.  Has ever had Guillain-Barré Syndrome (also called GBS). In some cases, your health care provider may decide to postpone influenza vaccination to a future visit. People with minor illnesses, such as a cold, may be vaccinated. People who are moderately or severely ill should usually wait until they recover before getting influenza vaccine. Your health care provider can give you more information. 4. Risks of a reaction  Soreness, redness, and swelling where shot is given, fever, muscle aches, and headache can happen after influenza vaccine.  There may be a very small increased risk of Guillain-Barré Syndrome (GBS) after inactivated influenza vaccine (the flu shot). [employer] children who get the flu shot along with pneumococcal vaccine (PCV13), and/or DTaP vaccine at the same time might be slightly more likely to have a seizure caused by fever. Tell your health care provider if a child who is getting flu vaccine has ever had a seizure. People sometimes faint after medical procedures, including vaccination. Tell your provider if you feel dizzy or have vision changes or ringing in the ears. As with any medicine, there is a very remote chance of a vaccine causing a severe allergic reaction, other serious injury, or death. 5. What if there is a serious problem? An allergic reaction could occur after the vaccinated person leaves the clinic.  If you see signs of a severe allergic reaction (hives, swelling of the face and throat, difficulty breathing, a fast heartbeat, dizziness, or weakness), call 9-1-1 and get the person to the nearest hospital. 
 
For other signs that concern you, call your health care provider. Adverse reactions should be reported to the Vaccine Adverse Event Reporting System (VAERS). Your health care provider will usually file this report, or you can do it yourself. Visit the VAERS website at www.vaers. hhs.gov or call 7-270.559.7992. VAERS is only for reporting reactions, and VAERS staff do not give medical advice. 6. The National Vaccine Injury Compensation Program 
 
The [facility] Vaccine Injury Compensation Program (VICP) is a federal program that was created to compensate people who may have been injured by certain vaccines. Visit the VICP website at www.[facility].gov/vaccinecompensation or call 0-374.724.8138 to learn about the program and about filing a claim. There is a time limit to file a claim for compensation. 7. How can I learn more?  Ask your health care provider.  Call your local or state health department.  Contact the Centers for Disease Control and Prevention (CDC): 
- Call 3-603.722.8637 (9-924-KJP-INFO) or 
- Visit CDCs influenza website at www.cdc.gov/flu Vaccine Information Statement (Interim) Inactivated Influenza Vaccine 8/15/2019 
42 KAY Santana Enfora 141FS-96 [facility] of Health and Stand In Centers for Disease Control and Prevention Office Use Only Medicare Wellness Visit, Male The best way to live healthy is to have a lifestyle where you eat a well-balanced diet, exercise regularly, limit alcohol use, and quit all forms of tobacco/nicotine, if applicable. Regular preventive services are another way to keep healthy. Preventive services (vaccines, screening tests, monitoring & exams) can help personalize your care plan, which helps you manage your own care. Screening tests can find health problems at the earliest stages, when they are easiest to treat. [facility] follows the current, evidence-based guidelines published by the [facility] Cristian Brown ([facility]) when recommending preventive services for our patients. Because we follow these guidelines, sometimes recommendations change over time as research supports it. (For example, a prostate screening blood test is no longer routinely recommended for men with no symptoms). Of course, you and your doctor may decide to screen more often for some diseases, based on your risk and co-morbidities (chronic disease you are already diagnosed with). Preventive services for you include: - Medicare offers their members a free annual wellness visit, which is time for you and your primary care provider to discuss and plan for your preventive service needs. Take advantage of this benefit every year! 
-All adults over age 72 should receive the recommended pneumonia vaccines. Current USPSTF guidelines recommend a series of two vaccines for the best pneumonia protection.  
-All adults should have a flu vaccine yearly and tetanus vaccine every 10 years. 
-All adults age 48 and older should receive the shingles vaccines (series of two vaccines). -All adults age 38-68 who are overweight should have a diabetes screening test once every three years.  
-Other screening tests & preventive services for persons with diabetes include: an eye exam to screen for diabetic retinopathy, a kidney function test, a foot exam, and stricter control over your cholesterol.  
-Cardiovascular screening for adults with routine risk involves an electrocardiogram (ECG) at intervals determined by the provider.  
-Colorectal cancer screening should be done for adults age 54-65 with no increased risk factors for colorectal cancer. There are a number of acceptable methods of screening for this type of cancer. Each test has its own benefits and drawbacks.  Discuss with your provider what is most appropriate for you during your annual wellness visit. The different tests include: colonoscopy (considered the best screening method), a fecal occult blood test, a fecal DNA test, and sigmoidoscopy. 
-All adults born between [facility] should be screened once for Hepatitis C. 
-An Abdominal Aortic Aneurysm (AAA) Screening is recommended for men age 73-68 who has ever smoked in their lifetime. Here is a list of your current Health Maintenance items (your personalized list of preventive services) with a due date: 
Health Maintenance Due Topic Date Due  Shingles Vaccine (1 of 2) 07/11/2000  AAA Screening  07/11/2015 [facility]  03/31/2019  Colonoscopy  06/18/2019

## 2020-01-29 NOTE — ACP (ADVANCE CARE PLANNING)
Advance Care Planning Advance Care Planning (ACP) Provider Note - Comprehensive Date of ACP Conversation: 01/29/20 Persons included in Conversation:  patient and family Length of ACP Conversation in minutes:  16 minutes Authorized Decision Maker (if patient is incapable of making informed decisions): This person is: wife Healthcare Agent/Medical Power of  under Advance Directive General ACP for ALL Patients with Decision Making Capacity: 
 Importance of advance care planning, including choosing a healthcare agent to communicate patient's healthcare decisions if patient lost the ability to make decisions, such as after a sudden illness or accident Understanding of the healthcare agent role was assessed and information provided Review of Existing Advance Directive: 
Does this advance directive still reflect your preferences? Yes (Provide new form/Refer for assistance in updating) For Serious or Chronic Illness: 
Understanding of medical condition Interventions Provided: 
Recommended communicating the plan and making copies for the healthcare agent, personal physician, and others as appropriate (e.g., health system) Recommended review of completed ACP document annually or upon change in health status

## 2020-02-06 DIAGNOSIS — I10 PRIMARY HYPERTENSION: ICD-10-CM

## 2020-02-06 RX ORDER — CARVEDILOL 12.5 MG/1
TABLET ORAL
Qty: 180 TAB | Refills: 3 | Status: SHIPPED | OUTPATIENT
Start: 2020-02-06 | End: 2020-01-01 | Stop reason: SDUPTHER

## 2020-02-12 DIAGNOSIS — F43.9 STRESS: ICD-10-CM

## 2020-02-12 NOTE — TELEPHONE ENCOUNTER
[facility] Val[facility] as 10[facility]31/19 for a 30 d/s. UDS done 8/19/19  CSA signed 8/19/19    Last Visit: 1/29/20 with MD Linda Jerez  Next Appointment: 8/3/20 with MD Linda Jerez  Previous Refill Encounter(s): 10/31/19 #60    Requested Prescriptions     Pending Prescriptions Disp Refills    diazePAM (VALIUM) 5 mg tablet 60 Tab 0     Sig: Take 1 Tab by mouth two (2) times a day. Max Daily Amount: 10 mg.

## 2020-02-13 RX ORDER — DIAZEPAM 5 MG/1
5 TABLET ORAL 2 TIMES DAILY
Qty: 60 TAB | Refills: 0 | Status: SHIPPED | OUTPATIENT
Start: 2020-02-13 | End: 2020-03-11

## 2020-02-17 ENCOUNTER — TELEPHONE (OUTPATIENT)
Dept: INTERNAL MEDICINE CLINIC | Age: 70
End: 2020-02-17

## 2020-02-17 NOTE — TELEPHONE ENCOUNTER
Pt's spouse Greg Bahena calling and said he has received a bill from [facility] for his labwork drawn at  and she said she has never had to pay before. Álvaro Chaka is for 22.49. Spouse says she was told by [facility] billing office that RD can re-classify the labs and that should take care ot it. Please advise her at [phone].

## 2020-02-19 NOTE — TELEPHONE ENCOUNTER
Sent another message to billing, per patient we need to add a code 11 to the labs. Says it is what will tell the payer that it was drawn in office.  Waiting reply

## 2020-02-19 NOTE — TELEPHONE ENCOUNTER
Per Starr in billing they are leaving the balance as part of deductible patient to call their insurance to see if this is correct.

## 2020-03-11 DIAGNOSIS — F43.9 STRESS: ICD-10-CM

## 2020-03-11 RX ORDER — DIAZEPAM 5 MG/1
TABLET ORAL
Qty: 60 TAB | Refills: 0 | Status: SHIPPED | OUTPATIENT
Start: 2020-03-11 | End: 2020-04-08

## 2020-03-16 DIAGNOSIS — I10 PRIMARY HYPERTENSION: ICD-10-CM

## 2020-03-16 RX ORDER — CLOPIDOGREL BISULFATE 75 MG/1
TABLET ORAL
Qty: 90 TAB | Refills: 3 | Status: SHIPPED | OUTPATIENT
Start: 2020-03-16 | End: 2021-01-01

## 2020-03-16 RX ORDER — BENAZEPRIL HYDROCHLORIDE 20 MG/1
TABLET ORAL
Qty: 90 TAB | Refills: 3 | Status: SHIPPED | OUTPATIENT
Start: 2020-03-16 | End: 2021-01-01

## 2020-03-16 RX ORDER — AMLODIPINE BESYLATE 10 MG/1
TABLET ORAL
Qty: 90 TAB | Refills: 3 | Status: SHIPPED | OUTPATIENT
Start: 2020-03-16 | End: 2021-01-01

## 2020-04-08 DIAGNOSIS — F43.9 STRESS: ICD-10-CM

## 2020-04-08 RX ORDER — DIAZEPAM 5 MG/1
TABLET ORAL
Qty: 60 TAB | Refills: 0 | Status: SHIPPED | OUTPATIENT
Start: 2020-04-08 | End: 2020-01-01

## 2020-08-03 NOTE — PROGRESS NOTES
Laurie Mederos is a 79 y.o. male, evaluated via audio-only technology on 8/3/2020 for No chief complaint on file. . 
 
Assessment & Plan:  
Diagnoses and all orders for this visit: 
 
1. Microcytic anemia 
-     REFERRAL TO HEMATOLOGY 2. Primary hypertension 3. Peripheral vascular disease s/p left iliac bpg Dr. Ayush Hernandez 10/10 
 
4. GERD without esophagitis 5. Type 2 diabetes mellitus with diabetic neuropathy, without long-term current use of insulin ([facility] [address] 6. Controlled type 2 diabetes mellitus with albuminuria -     METABOLIC PANEL, COMPREHENSIVE; Future -     LIPID PANEL; Future 
-     HEMOGLOBIN A1C W/O EAG; Future -     CBC W/O DIFF; Future 7. Chronic kidney disease (CKD) stage G3a/A3 8. Hyperlipidemia, unspecified hyperlipidemia type 9. Chronic low back pain without sciatica, unspecified back pain laterality 12 Subjective: No flowsheet data found. Laurie Mederos, who was evaluated through a patient-initiated, synchronous (real-time) audio only encounter, and/or her healthcare decision maker, is aware that it is a billable service, with coverage as determined by his insurance carrier. He provided verbal consent to proceed: Yes. He has not had a related appointment within my department in the past 7 days or scheduled within the next 24 hours. Total Time: minutes: 21-30 minutes Rene Cantu MD  
 
No seizure since the last visit No cardiovascular complaints. No exercise but remains active with chores and lawn work. No claudication and he is seeing vascular once a year No polyuria, polydipsia, nocturia, vision change or neurologic complaints. Not checking sugars regularly. Weight is stable. Sees Dr. Gonzalo Ortega No gi or gu issues He has not been regular with taking the iron supp due to tendency for constipation LAST MEDICARE WELLNESS EXAM: 7/13/16, 7/20/17, 10/3/18, 1/29/20 Past Medical History:  
Diagnosis Date  Anemia fe def w gi w/u Dr Bereket Waldrop  Basal cell carcinoma   
 s/p resection Dr. Joey Ventura  Cervical spine disease 2000s Dr Marcelle Tai  Chronic back [facility]  Chronic kidney disease 04/2017  
 saw Dr Danni Rey  Colon adenoma   
 and diverticulosis Dr Bereket Waldrop 2014  Diabetes mellitus ([facility] [address] [id number]  
 microalbuminuria 5/14  DJD (degenerative joint disease)  Dyshidrotic eczema  Dyslipidemia  ED (erectile dysfunction)  FHx: heart disease  Gastritis 2014 Dr Bereket Waldrop neg h pylori  GERD (gastroesophageal reflux disease) 2014  
 on EGD Dr Bereket Waldrop  Hypertension  Left renal artery stenosis ([facility] [facility] 11/30/2017  
 f/u duplex 11/18 by vasc was negative  Meniere's disease   
 s/p surgery Dr Rodrigo Wan  Overweight (BMI 25.0-29.9) 11/30/2017  PAD (peripheral artery disease) ([facility] Dr Gustavo Moreira  Rosacea  Seizure ([facility] 03/2019  
 possible Dr Shalonda Jung at [facility]; mri neg, EEG neg, on keppra  Syncope 02/2018  
 probably vasovagal [facility] Past Surgical History:  
Procedure Laterality Date  CARDIAC SURG PROCEDURE UNLIST  10/10 NST neg EF 68%  CARDIAC SURG PROCEDURE UNLIST  02/2018  
 echo nl LV, ef 62%, mild mr/tr/pr, rvp 33 [facility]  
 HX CHOLECYSTECTOMY  2011 Dr. Fallon Rojo HX COLONOSCOPY  2014  
 adenoma and divertics Dr Bereket Waldrop  HX GI  2014  
 pill camera non bleeding ileal AVM  HX TONSILLECTOMY  NEUROLOGICAL PROCEDURE UNLISTED  03/2019  
 mri head contrast showed no acute lesions  SINUS SURGERY PROC UNLISTED    
 left ear surgery Dr Rodrigo Wan for meniere's  VASCULAR SURGERY PROCEDURE UNLIST  10/10 Illiac Bypass left Graft Surgery  Dr. Gustavo Moreira  VASCULAR SURGERY PROCEDURE UNLIST  03/2019  
 carotids showed 77%FMVX, <50%LICA Social History Socioeconomic History  Marital status:  Spouse name: Not on file  Number of children: 0  
 Years of education: Not on file  Highest education level: Not on file Occupational History  Occupation: [employer] Social Needs  Financial resource strain: Not on file  Food insecurity Worry: Not on file Inability: Not on file  Transportation needs Medical: Not on file Non-medical: Not on file Tobacco Use  Smoking status: Former Smoker Packs/day: 1.00 Years: 40.00 Pack years: 40.00 Last attempt to quit: 10/22/2011 Years since quittin.7  Smokeless tobacco: Never Used Substance and Sexual Activity  Alcohol use: No  
 Drug use: No  
 Sexual activity: Not on file Lifestyle  Physical activity Days per week: Not on file Minutes per session: Not on file  Stress: Not on file Relationships  Social connections Talks on phone: Not on file Gets together: Not on file Attends Mandaeism service: Not on file Active member of club or organization: Not on file Attends meetings of clubs or organizations: Not on file Relationship status: Not on file  Intimate partner violence Fear of current or ex partner: Not on file Emotionally abused: Not on file Physically abused: Not on file Forced sexual activity: Not on file Other Topics Concern  Not on file Social History Narrative  Not on file Current Outpatient Medications Medication Sig  
 diazePAM (VALIUM) 5 mg tablet TAKE 1 TABLET BY MOUTH 2 TIMES A DAY (MAXIMUM 2 TABS PER DAY)  gabapentin (NEURONTIN) 600 mg tablet TAKE 1 TABLET BY MOUTH 3 TIMES A DAY  metFORMIN (GLUCOPHAGE) 1,000 mg tablet TAKE 1 TABLET BY MOUTH 2 TIMES A DAY WITH MEALS  
 levETIRAcetam (KEPPRA) 750 mg tablet TAKE 1 TABLET BY MOUTH 2 TIMES A DAY  clopidogreL (PLAVIX) 75 mg tab TAKE 1 TABLET BY MOUTH ONCE DAILY  amLODIPine (NORVASC) 10 mg tablet TAKE 1 TABLET BY MOUTH ONCE DAILY  benazepriL (LOTENSIN) 20 mg tablet TAKE 1 TABLET BY MOUTH ONCE DAILY  carvediloL (COREG) 12.5 mg tablet TAKE 1 TABLET BY MOUTH 2 TIMES A DAY  lansoprazole (PREVACID) 30 mg capsule TAKE 1 CAPSULE BY MOUTH ONCE DAILY BEFORE BREAKFAST  atorvastatin (LIPITOR) 40 mg tablet TAKE 1 TABLET BY MOUTH ONCE DAILY  naloxone (NARCAN) 4 mg/actuation nasal spray Use 1 spray intranasally into 1 nostril. Use a new Narcan nasal spray for subsequent doses and administer into alternating nostrils. May repeat every 2 to 3 minutes as needed.  acetaminophen (TYLENOL) 325 mg tablet Take  by mouth every four (4) hours as needed for Pain.  aspirin 81 mg chewable tablet Take 81 mg by mouth daily.  triamcinolone (ARISTOCORT) 0.5 % topical cream Apply  to affected area two (2) times a day. use thin layer (Patient taking differently: Apply  to affected area two (2) times a day. use thin layer  Indications: applying to left ear) No current facility-administered medications for this visit. No Known Allergies REVIEW OF SYSTEMS: sees Dr. Yudelka Thomas, no podiatry, colo 2014 Dr Melchor Salcido Ophtho  no vision change or eye pain Oral  no mouth pain, tongue or tooth problems Ears  no hearing loss, ear pain, fullness, no swallowing problems Cardiac  no CP, PND, orthopnea, edema, palpitations or syncope Chest  no breast masses Resp  no wheezing, chronic coughing, dyspnea GI  no heartburn, nausea, vomiting, change in bowel habits, bleeding, hemorrhoids Urinary  no dysuria, hematuria, flank pain, urgency, frequency LABS From 10/11 showed gluc 99,  cr 0.98, gfr 83, alt 43,  hba1c 5.6, ldl-p 1018, chol 124, tg 223, hdl 37, ldl-c 42, umar 11.2, tsh 2.10,          psa 0.90 From 4/12 showed                  hba1c 5.8, ldl-p 1136, chol 143, tg 188, hdl 38, ldl-c 67, umar 9.5 From 10/12 showed gluc 87,  cr 0.99, gfr 81, alt 15,  hba1c 5.6, ldl-p 500,   chol 109, tg 125, hdl 40, ldl-c 44 From 7/13 showed                  hba1c 5.7,                   chol 136, tg 137, hdl 42, ldl-c 67  
 From 2/14 showed                  hba1c 6.0,               chol 136, tg 72,   hdl 52, ldl-c 72, umar 347,  wbc 12.4, hb 12.0, plt 240, psa 1.39 From 5/14 showed                  hba1c 6.0,               chol 135, tg 94,   hdl 45, ldl-c 71, umar 469,  wbc 9.5,   hb 11.6, plt 232, psa 1.35 From 5/14 showed                                      fe 25, %sat 7, ferritin 32, b12 699, fol 16.1, spep neg From 6/14 showed   gluc 100, cr 1.33, gfr 54 From 8/14 showed                  hba1c 6.1,               chol 137, tg 93,   hdl 46, ldl-c 98, umar 100 From 2/15 showed   gluc 100, cr 1.50, gfr 46, alt 14, hba1c 5.9,             umar 82.1,  wbc 9.9,  hb 11.2, plt 203 From 9/15 showed   gluc 88,   cr 1.30,    alt 11, hba1c 5.6,    chol 135, tg 123, hdl 43, ldl-c 67, umar 159,   wbc 9.2,  hb 11.8, plt 225 From 3/16 showed   gluc 77,   cr 1.58, gfr 45,   hba1c 6.0,                    hep c neg, na 133 From 7/16 showed   gluc 96,   cr 1.41, gfr 52,   hba1c 5.9,             umar 163,   wbc 9.6,  hb 12.1, plt 281,                   na 131 From 1/17 showed   gluc 79,   cr 1.50, gfr 48 From 7/17 showed   gluc 92,   cr 1.57, gfr 45, alt 6,   hba1c 5.8,   chol 170, tg 135, hdl 44, ldl-c 99, umar 1043 From 11/17 showed      hba1c 5.7,   chol 156, tg 125, hdl 51, ldl-c 80, umar 779,   wbc 10.1, hb 12.0, plt 264 From 2/18 showed   gluc 83,   cr 1.40, gfr 54,                       wbc 6.2,   hb 9.8,   plt 216, fe 66 ferritin 58.6, b12 376 From 3/18 showed   gluc 82,   cr 1.69, gfr 41,   hba1c 5.7,            umar 275 From 10/18 showed gluc 86,   cr 1.51, gfr47,   hba1c 5.7,             chol 149, tg 150, hdl 33, ldl-c 86 From 1/19 showed   gluc 80,   cr 1.43, gfr 50, alt 11, hba1c 5.8,     chol 139, tg 146, hdl 37, ld-c 73,    wbc 11.6, hb 11.3, plt 303, k 5.6 From 3/19 showed   gluc 127, cr 1.40, gfr 54,            wbc 7.3,   hb 9.4,   plt 222 From 8/19 showed   gluc 88,   cr 1.48, gfr 48, alt 14,  hba1c 5.9,   chol 118, tg 147, hdl 30, ldl-c 59, umar 760,   wbc 7.2,   hb 10.1, plt 238 From 1/20 showed   gluc 76,   cr 1.52, gfr 46,    hba1c 5.4,             chol 113, tg 177, hdl 33, ldl-c 45, umar 53 Results for orders placed or performed in visit on 07/23/20 CBC W/O DIFF Result Value Ref Range WBC 7.9 3.4 - 10.8 x10E3/uL  
 RBC 3.47 (L) 4.14 - 5.80 x10E6/uL HGB 9.2 (L) 13.0 - 17.7 g/dL HCT 29.1 (L) 37.5 - 51.0 % MCV 84 79 - 97 fL  
 MCH 26.5 (L) 26.6 - 33.0 pg  
 MCHC 31.6 31.5 - 35.7 g/dL  
 RDW 14.1 11.6 - 15.4 % PLATELET 898 630 - 730 x10E3/uL HEMOGLOBIN A1C W/O EAG Result Value Ref Range Hemoglobin A1c 5.6 4.8 - 5.6 % METABOLIC PANEL, BASIC Result Value Ref Range Glucose 86 65 - 99 mg/dL BUN 15 8 - 27 mg/dL Creatinine 1.32 (H) 0.76 - 1.27 mg/dL GFR est non-AA 54 (L) >59 mL/min/1.73 GFR est AA 63 >59 mL/min/1.73  
 BUN/Creatinine ratio 11 10 - 24 Sodium 136 134 - 144 mmol/L Potassium 5.2 3.5 - 5.2 mmol/L Chloride 102 96 - 106 mmol/L  
 CO2 21 20 - 29 mmol/L Calcium 9.0 8.6 - 10.2 mg/dL PSA SCREENING (SCREENING) Result Value Ref Range Prostate Specific Ag 1.3 0.0 - 4.0 ng/mL CKD REPORT Result Value Ref Range Interpretation Note Patient Active Problem List  
Diagnosis Code  Hyperlipidemia E78.5  Chronic back pain inoperable Dr. Andre Tucker and Dr. Cherylene Nims M54.9, O23.29  
 Peripheral vascular disease s/p left iliac bpg Dr. Kerri Castillo 10/10 I73.9  Erectile dysfunction N52.9  Arthritis, degenerative M19.90  
 Primary hypertension I10  
 GERD without esophagitis K21.9  Controlled type 2 diabetes mellitus with albuminuria E11.29, R80.9  Advance directive in chart Z78.9  Chronic kidney disease (CKD) stage G3a/A3 N18.3  Type 2 diabetes mellitus with diabetic neuropathy (HCC) E11.40 Assessment and plan: 1. Diane's.  Followup Dr. Jacqueline jacobo 
 2. Diabetes w microalbuminuria. Well-controlled on metformin alone, close watch on renal fxn 3. Hyperlipidemia. Continue current regimen. 4. Chronic back problems. Continue current. Declined w/u or tx of c spine issues 5. Vascular. F/U Dr. Keira Ryder as directed 6. CKD. F/U Dr Cary Looney 7. HTN. Continue current regimen. 8. Colon adenoma. Declined referral despite long discussion 9. Possible seizures. continue keppra 10. Anemia. Will send to heme for consideration for iron infusions RTC 2/21 Above conditions discussed at length and patient vocalized understanding. All questions answered to patient satisfaction

## 2020-10-16 NOTE — PROGRESS NOTES
Hematology/Oncology Consultation Note Date: 10/16/2020 Name: Orion Soria : 1950 Bailey Sewell MD  
 
 
 
Subjective: Chief complaint: Anemia History of Present Illness: Mr. Will Castle is a most pleasant 79y.o. year old male who was seen for consultation of Anemia. The patient was accompanied by his wife during this visit. The patient has a past medical history significant of multiple comorbidities, chronic kidney disease, gastritis, seizure, diabetes mellitus, HTN. The patient reported chronic fatigue for months with low energy level. CBC on 2020 reported hemoglobin of 9.2, hematocrit of 29.1, platelet of 077L. BUN/creatinine of 15/1. 32. Lab reviews indicated chronic anemia since at least 10/12/2010, hemoglobin of 11.6. The patient otherwise has no other complaints. Denied fever, chills, night sweat, unintentional weight loss, skin lumps or bumps, acute bleeding or bruising issues. No acute bleeding, blood in stool, dark stool, melena, hematochezia, hemoptysis, dark urine, or easily bruising. Denied headache, acute vision change, dizziness, chest pain, worsen shortness of breath, palpitation, productive cough, nausea, vomiting, abdominal pain, altered bowel habits, dysuria, new bone pain or back pain, focal numbness or weakness. Oncologic History Past Medical History, Family History, and Social History: 
 
Past Medical History:  
Diagnosis Date  Anemia   
 fe def w gi w/u Dr Herson Santos  Basal cell carcinoma   
 s/p resection Dr. Danna Clark  Cervical spine disease  Dr Alexy Gan  Chronic back pain inoperable Dr. Dwayne Cheema and Dr. Kristy Leigh  Chronic kidney disease 2017  
 saw Dr Arvin Shi  Colon adenoma   
 and diverticulosis Dr Herson Santos   Diabetes mellitus ([facility] [address] [id number]  
 microalbuminuria   DJD (degenerative joint disease)  Dyshidrotic eczema  Dyslipidemia  ED (erectile dysfunction)  FHx: heart disease  Gastritis  Dr Duane Van neg h pylori  GERD (gastroesophageal reflux disease)   
 on EGD Dr Duane Van  Hypertension  Left renal artery stenosis ([facility] [address] 2017  
 f/u duplex  by vasc was negative  Meniere's disease   
 s/p surgery Dr Greg Sarmiento  Overweight (BMI 25.0-29.9) 2017  PAD (peripheral artery disease) ([facility] [address] Dr Barbara Herrera  Rosacea  Seizure ([facility] [address] 2019  
 possible Dr Ángel Lindsay at [facility]; mri neg, EEG neg, on keppra  Syncope 2018  
 probably vasovagal [facility] Past Surgical History:  
Procedure Laterality Date  CARDIAC SURG PROCEDURE UNLIST  10/10 NST neg EF 68%  CARDIAC SURG PROCEDURE UNLIST  2018  
 echo nl LV, ef 62%, mild mr/tr/pr, rvp 33 [facility]  
 HX CHOLECYSTECTOMY   Dr. Yas Dotson HX COLONOSCOPY    
 adenoma and divertics Dr Duane Van  HX GI    
 pill camera non bleeding ileal AVM  HX TONSILLECTOMY  NEUROLOGICAL PROCEDURE UNLISTED  2019  
 mri head contrast showed no acute lesions  SINUS SURGERY PROC UNLISTED    
 left ear surgery Dr Greg Sarmiento for meniere's  VASCULAR SURGERY PROCEDURE UNLIST  10/10 Illiac Bypass left Graft Surgery  Dr. Barbara Herrera  VASCULAR SURGERY PROCEDURE UNLIST  2019  
 carotids showed 13%IDXW, <50%LICA Social History Socioeconomic History  Marital status:  Spouse name: Not on file  Number of children: 0  
 Years of education: Not on file  Highest education level: Not on file Occupational History  Occupation: [address] Social Needs  Financial resource strain: Not on file  Food insecurity Worry: Not on file Inability: Not on file  Transportation needs Medical: Not on file Non-medical: Not on file Tobacco Use  Smoking status: Former Smoker Packs/day: 1.00 Years: 40.00 Pack years: 40.00 Last attempt to quit: 10/22/2011 Years since quittin.9  Smokeless tobacco: Never Used Substance and Sexual Activity  Alcohol use: No  
 Drug use: No  
 Sexual activity: Not on file Lifestyle  Physical activity Days per week: Not on file Minutes per session: Not on file  Stress: Not on file Relationships  Social connections Talks on phone: Not on file Gets together: Not on file Attends Zoroastrian service: Not on file Active member of club or organization: Not on file Attends meetings of clubs or organizations: Not on file Relationship status: Not on file  Intimate partner violence Fear of current or ex partner: Not on file Emotionally abused: Not on file Physically abused: Not on file Forced sexual activity: Not on file Other Topics Concern  Not on file Social History Narrative  Not on file Family History Problem Relation Age of Onset  Heart Disease Mother  Stroke Father Current Outpatient Medications Medication Sig Dispense Refill  diazePAM (VALIUM) 5 mg tablet TAKE 1 TABLET BY MOUTH 2 TIMES A DAY *MAX OF 2 TABLETS PER DAY* 60 Tab 0  
 atorvastatin (LIPITOR) 40 mg tablet TAKE 1 TABLET BY MOUTH ONCE DAILY 90 Tab 3  
 gabapentin (NEURONTIN) 600 mg tablet TAKE 1 TABLET BY MOUTH 3 TIMES A  Tab 1  
 metFORMIN (GLUCOPHAGE) 1,000 mg tablet TAKE 1 TABLET BY MOUTH 2 TIMES A DAY WITH MEALS 180 Tab 3  
 levETIRAcetam (KEPPRA) 750 mg tablet TAKE 1 TABLET BY MOUTH 2 TIMES A  Tab 3  clopidogreL (PLAVIX) 75 mg tab TAKE 1 TABLET BY MOUTH ONCE DAILY 90 Tab 3  
 amLODIPine (NORVASC) 10 mg tablet TAKE 1 TABLET BY MOUTH ONCE DAILY 90 Tab 3  
 benazepriL (LOTENSIN) 20 mg tablet TAKE 1 TABLET BY MOUTH ONCE DAILY 90 Tab 3  carvediloL (COREG) 12.5 mg tablet TAKE 1 TABLET BY MOUTH 2 TIMES A  Tab 3  
 acetaminophen (TYLENOL) 325 mg tablet Take  by mouth every four (4) hours as needed for Pain.  aspirin 81 mg chewable tablet Take 81 mg by mouth daily.  triamcinolone (ARISTOCORT) 0.5 % topical cream Apply  to affected area two (2) times a day. use thin layer (Patient taking differently: Apply  to affected area two (2) times a day. use thin layer  Indications: applying to left ear) 60 g 3  
 lansoprazole (PREVACID) 30 mg capsule TAKE 1 CAPSULE BY MOUTH ONCE DAILY BEFORE BREAKFAST 90 Cap 3  
 naloxone (NARCAN) 4 mg/actuation nasal spray Use 1 spray intranasally into 1 nostril. Use a new Narcan nasal spray for subsequent doses and administer into alternating nostrils. May repeat every 2 to 3 minutes as needed. 1 Each 1 Review of Systems Constitutional: Positive for malaise/fatigue. Negative for chills, diaphoresis, fever and weight loss. Respiratory: Negative for cough, hemoptysis, shortness of breath and wheezing. Cardiovascular: Negative for chest pain, palpitations and leg swelling. Gastrointestinal: Negative for abdominal pain, diarrhea, heartburn, nausea and vomiting. Genitourinary: Negative for dysuria, frequency, hematuria and urgency. Musculoskeletal: Negative for joint pain and myalgias. Skin: Negative for itching and rash. Neurological: Negative for dizziness, seizures, weakness and headaches. Psychiatric/Behavioral: Negative for depression. The patient does not have insomnia. Objective:  
 
Visit Vitals BP (!) 161/79 (BP Patient Position: Sitting) Pulse 78 Temp 97.5 °F (36.4 °C) (Oral) Resp 16 Ht 5' 11\" (1.803 m) Wt 71.7 kg (158 lb) SpO2 94% BMI 22.04 kg/m² ECOG Performance Status (grade): 1 
0 - able to carry on all pre-disease activity w/out restriction 1 - restricted but able to carry out light work 2 - ambulatory and can self- care but unable to carry out work 3 - bed or chair >50% of waking hours 4 - completely disable, total care, confined to bed or chair Physical Exam 
Constitutional:   
   General: He is not in acute distress. HENT:  
   Head: Normocephalic and atraumatic. Eyes:  
   Pupils: Pupils are equal, round, and reactive to light. Neck: Musculoskeletal: Neck supple. No neck rigidity. Cardiovascular:  
   Pulses: Normal pulses. Heart sounds: Normal heart sounds. No murmur. Pulmonary:  
   Effort: Pulmonary effort is normal. No respiratory distress. Breath sounds: Normal breath sounds. Abdominal:  
   General: Bowel sounds are normal. There is no distension. Palpations: Abdomen is soft. There is no mass. Tenderness: There is no abdominal tenderness. There is no guarding. Musculoskeletal:     
   General: No swelling or tenderness. Lymphadenopathy:  
   Cervical: No cervical adenopathy. Skin: 
   General: Skin is warm. Findings: No rash. Neurological:  
   Mental Status: He is alert and oriented to person, place, and time. Mental status is at baseline. Cranial Nerves: No cranial nerve deficit. Psychiatric:     
   Mood and Affect: Mood normal.  
 
  
 
Diagnostics: No results found for this or any previous visit (from the past 96 hour(s)). Imaging: No results found for this or any previous visit. Results for orders placed during the hospital encounter of 08/15/19 XR CHEST PA LAT Narrative EXAMINATION: Chest 2 views INDICATION: Abnormal physical exam, Rales COMPARISON: 8/15/2019 FINDINGS: Frontal lateral views of the chest obtained. Mediastinal silhouette 
and pulmonary vasculature unremarkable. No consolidation. Bilateral upper lung 
and lower lung streaky densities including some coarse and reticular appearing 
markings. No confluent consolidation. No definite pneumothorax. Biapical pleural 
shadow thickening. No obvious acute osseous findings. Impression IMPRESSION: 
 
There is a pulmonary interstitial thickening and streaky densities described 
above, felt to be at least partly due to chronic interstitial thickening. Component of interstitial infiltrate/edema also possible. Consider imaging follow-up. Results for orders placed during the hospital encounter of 03/25/19 CT HEAD WO CONT Narrative Noncontrast head CT. Indication: syncope/possible seizure. Comparison: 2/14/2018. TECHNIQUE: 4 mm axial images without contrast. Coronal and sagittal 
reconstructions. Findings: Cortical atrophy. With ischemic white matter change. No intracranial 
hemorrhage or acute infarct. Bony calvarium intact. Mucus retention cysts right 
maxillary sinus. Ethmoid sinusitis. Impression Impression: No acute intracranial abnormalities. DICOM format image data is available to non-affiliated external healthcare 
facilities or entities on a secure, media free, reciprocally searchable basis 
with patient authorization for 12 months following the date of the study. Assessment: 1. Normocytic anemia 2. Chronic anemia Plan: #  Normocytic anemia # Chronic anemia 
-- The patient has a past medical history significant of multiple comorbidities, chronic kidney disease, gastritis, seizure, diabetes mellitus, HTN. -- Lab reviews indicated chronic anemia since at least 10/12/2010, hemoglobin of 11.6. -- CBC on 7/23/2020 reported hemoglobin of 9.2, hematocrit of 29.1, platelet of 865Q. BUN/creatinine of 15/1.32. 
-- Today I have reviewed with the patient and family about his lab findings. The differential diagnosis of normocytic anemia includes a variety of diverse diagnoses. It may be seen with early iron deficiency, anemia of chronic disease, lead poisoning, combined deficiency state (iron deficiency combined with either B12 or folate deficiency), acute blood loss, non-spherocytic hemolytic anemia, myelodysplastic syndrome, renal failure, liver failure, hypothyroidism, pure red cell aplasia. The patients history and physical examination allow some of these diagnoses to be excluded. -- Initial laboratories that should be checked include comprehensive metabolic profile, reticulocyte count, ESR, iron study with ferritin, B12, folate, LDH, and haptoglobin. Roughly 70% of early cases of monoclonal gammopathy have a normochromic normocytic anemia, so I will also check a SPEP and SFLC. I have reviewed with the patient the initial part of the work-up and cautioned that if there are no obvious answers with the tests, I may need to do a bone marrow biopsy and aspiration to try to make a diagnosis. -- I will see the patient back in clinic in about 3- 4 weeks to review reports. Always sooner if required. Orders Placed This Encounter  METABOLIC PANEL, COMPREHENSIVE Standing Status:   Future Standing Expiration Date:   10/17/2021  
 IRON PROFILE Standing Status:   Future Standing Expiration Date:   10/17/2021  FERRITIN Standing Status:   Future Standing Expiration Date:   10/16/2021  VITAMIN B12 & FOLATE Standing Status:   Future Standing Expiration Date:   10/16/2021  RETICULOCYTE COUNT Standing Status:   Future Standing Expiration Date:   10/16/2021  SED RATE (ESR) Standing Status:   Future Standing Expiration Date:   10/16/2021  LD Standing Status:   Future Standing Expiration Date:   10/16/2021  
 HAPTOGLOBIN Standing Status:   Future Standing Expiration Date:   10/16/2021  CBC WITH AUTOMATED DIFF Standing Status:   Future Standing Expiration Date:   10/17/2021  
 PROTEIN ELECTROPHORESIS W/ REFLX KRISH Standing Status:   Future Standing Expiration Date:   4/16/2021 Mr. Johnson has a reminder for a \"due or due soon\" health maintenance. I have asked that he contact his primary care provider for follow-up on this health maintenance. All of patient's questions answered to their apparent satisfaction. They verbally show understanding and agreement with aforementioned plan. I would like to thank Dr Maricruz Nicholas MD  for allowing me to participate in the care of this very pleasant patient. If I can be of further assistance please do not hesitate to call. Elmer Betancourt MD 
10/16/2020 About 45 minutes were spent for this encounter with more than 50% of the time spent in face-to-face counseling, discussing on diagnosis and management plan going forward, and co-ordination of care. Parts of this document has been produced using Dragon dictation system. Unrecognized errors in transcription may be present. Please do not hesitate to reach out for any questions or clarifications.  
 
 
CC: Maricruz Nicholas MD

## 2020-11-16 NOTE — PROGRESS NOTES
Hematology/Oncology Note Date: 2020 Name: Akbar Strauss : 1950 Don[place], MD  
 
 
 
Subjective: Chief complaint: Anemia History of Present Illness: Mr. Beth Groves is a most pleasant 79y.o. year old male who was seen initially for consultation of Anemia. The patient was accompanied by his wife during this visit. The patient has a past medical history significant of multiple comorbidities, chronic kidney disease, gastritis, seizure, diabetes mellitus, HTN. The patient reported chronic fatigue for months with low energy level. CBC on 2020 reported hemoglobin of 9.2, hematocrit of 29.1, platelet of 338M. BUN/creatinine of 15/1. 32. Lab reviews indicated chronic anemia since at least 10/12/2010, hemoglobin of 11.6. Since last visit he has no other complaints. Denied fever, chills, night sweat, unintentional weight loss, skin lumps or bumps, acute bleeding or bruising issues. No acute bleeding, blood in stool, dark stool, melena, hematochezia, hemoptysis, dark urine, or easily bruising. Denied headache, acute vision change, dizziness, chest pain, worsen shortness of breath, palpitation, productive cough, nausea, vomiting, abdominal pain, altered bowel habits, dysuria, new bone pain or back pain, focal numbness or weakness. Oncologic History Past Medical History, Family History, and Social History: 
 
Past Medical History:  
Diagnosis Date  Anemia   
 fe def w gi w/u Dr Kapil Jaimes  Basal cell carcinoma   
 s/p resection Dr. Jeremiah Rod  Cervical spine disease  Dr Lyric Chand  Chronic back pain inoperable Dr. Ramsey Bravo and Dr. Kiesha Early  Chronic kidney disease 2017  
 saw Dr Ammie Crigler  Colon adenoma   
 and diverticulosis Dr Kapil Jaimes 2014  Diabetes mellitus ([address] [id number]  
 microalbuminuria   DJD (degenerative joint disease)  Dyshidrotic eczema  Dyslipidemia  ED (erectile dysfunction)  FHx: heart disease  Gastritis  Dr Lewis Barrientos neg h pylori  GERD (gastroesophageal reflux disease)   
 on EGD Dr Lewis Barrientos  Hypertension  Left renal artery stenosis ([facility] [address] 2017  
 f/u duplex  by vas was negative  Meniere's disease   
 s/p surgery Dr Cipriano Henderson  Overweight (BMI 25.0-29.9) 2017  PAD (peripheral artery disease) ([facility] [address] Dr Humza Elaine  Rosacea  Seizure ([facility] [address] 2019  
 possible Dr Nehemiah Hurtado at [facility]; mri neg, EEG neg, on keppra  Syncope 2018  
 probably vasovagal [facility] Past Surgical History:  
Procedure Laterality Date  CARDIAC SURG PROCEDURE UNLIST  10/10 NST neg EF 68%  CARDIAC SURG PROCEDURE UNLIST  2018  
 echo nl LV, ef 62%, mild mr/tr/pr, rvp 33 [facility]  
 HX CHOLECYSTECTOMY   Dr. Nichole Solomon HX COLONOSCOPY    
 adenoma and divertics Dr Lewis Barrientos  HX GI  2014  
 pill camera non bleeding ileal AVM  HX TONSILLECTOMY  NEUROLOGICAL PROCEDURE UNLISTED  2019  
 mri head contrast showed no acute lesions  SINUS SURGERY PROC UNLISTED    
 left ear surgery Dr Cipriano Henderson for meniere's  VASCULAR SURGERY PROCEDURE UNLIST  10/10 Illiac Bypass left Graft Surgery  Dr. Humza Elaien  VASCULAR SURGERY PROCEDURE UNLIST  2019  
 carotids showed 24%NXJH, <50%LICA Social History Socioeconomic History  Marital status:  Spouse name: Not on file  Number of children: 0  
 Years of education: Not on file  Highest education level: Not on file Occupational History  Occupation: [address] Social Needs  Financial resource strain: Not on file  Food insecurity Worry: Not on file Inability: Not on file  Transportation needs Medical: Not on file Non-medical: Not on file Tobacco Use  Smoking status: Former Smoker Packs/day: 1.00 Years: 40.00 Pack years: 40.00 Last attempt to quit: 10/22/2011 Years since quittin.0  Smokeless tobacco: Never Used Substance and Sexual Activity  Alcohol use: No  
 Drug use: No  
 Sexual activity: Not on file Lifestyle  Physical activity Days per week: Not on file Minutes per session: Not on file  Stress: Not on file Relationships  Social connections Talks on phone: Not on file Gets together: Not on file Attends Restorationism service: Not on file Active member of club or organization: Not on file Attends meetings of clubs or organizations: Not on file Relationship status: Not on file  Intimate partner violence Fear of current or ex partner: Not on file Emotionally abused: Not on file Physically abused: Not on file Forced sexual activity: Not on file Other Topics Concern  Not on file Social History Narrative  Not on file Family History Problem Relation Age of Onset  Heart Disease Mother  Stroke Father Current Outpatient Medications Medication Sig Dispense Refill  lansoprazole (PREVACID) 30 mg capsule TAKE 1 CAPSULE BY MOUTH ONCE DAILY BEFORE BREAKFAST 90 Cap 3  
 diazePAM (VALIUM) 5 mg tablet TAKE 1 TABLET BY MOUTH 2 TIMES A DAY (MAXIMUM 2 TABS PER DAY) 60 Tab 0  
 atorvastatin (LIPITOR) 40 mg tablet TAKE 1 TABLET BY MOUTH ONCE DAILY 90 Tab 3  
 gabapentin (NEURONTIN) 600 mg tablet TAKE 1 TABLET BY MOUTH 3 TIMES A  Tab 1  
 metFORMIN (GLUCOPHAGE) 1,000 mg tablet TAKE 1 TABLET BY MOUTH 2 TIMES A DAY WITH MEALS 180 Tab 3  
 levETIRAcetam (KEPPRA) 750 mg tablet TAKE 1 TABLET BY MOUTH 2 TIMES A  Tab 3  clopidogreL (PLAVIX) 75 mg tab TAKE 1 TABLET BY MOUTH ONCE DAILY 90 Tab 3  
 amLODIPine (NORVASC) 10 mg tablet TAKE 1 TABLET BY MOUTH ONCE DAILY 90 Tab 3  
 benazepriL (LOTENSIN) 20 mg tablet TAKE 1 TABLET BY MOUTH ONCE DAILY 90 Tab 3  carvediloL (COREG) 12.5 mg tablet TAKE 1 TABLET BY MOUTH 2 TIMES A  Tab 3  
  naloxone (NARCAN) 4 mg/actuation nasal spray Use 1 spray intranasally into 1 nostril. Use a new Narcan nasal spray for subsequent doses and administer into alternating nostrils. May repeat every 2 to 3 minutes as needed. 1 Each 1  
 acetaminophen (TYLENOL) 325 mg tablet Take  by mouth every four (4) hours as needed for Pain.  aspirin 81 mg chewable tablet Take 81 mg by mouth daily.  triamcinolone (ARISTOCORT) 0.5 % topical cream Apply  to affected area two (2) times a day. use thin layer (Patient taking differently: Apply  to affected area two (2) times a day. use thin layer  Indications: applying to left ear) 60 g 3 Review of Systems Constitutional: Positive for malaise/fatigue. Negative for chills, diaphoresis, fever and weight loss. Respiratory: Negative for cough, hemoptysis, shortness of breath and wheezing. Cardiovascular: Negative for chest pain, palpitations and leg swelling. Gastrointestinal: Negative for abdominal pain, diarrhea, heartburn, nausea and vomiting. Genitourinary: Negative for dysuria, frequency, hematuria and urgency. Musculoskeletal: Negative for joint pain and myalgias. Skin: Negative for itching and rash. Neurological: Negative for dizziness, seizures, weakness and headaches. Psychiatric/Behavioral: Negative for depression. The patient does not have insomnia. Objective:  
 
Visit Vitals BP (!) 152/73 Pulse 75 Resp 18 Ht 5' 11\" (1.803 m) Wt 70.3 kg (155 lb) SpO2 99% BMI 21.62 kg/m² ECOG Performance Status (grade): 1 
0 - able to carry on all pre-disease activity w/out restriction 1 - restricted but able to carry out light work 2 - ambulatory and can self- care but unable to carry out work 3 - bed or chair >50% of waking hours 4 - completely disable, total care, confined to bed or chair Physical Exam 
Constitutional:   
   General: He is not in acute distress. HENT:  
   Head: Normocephalic and atraumatic. Eyes: Pupils: Pupils are equal, round, and reactive to light. Neck: Musculoskeletal: Neck supple. No neck rigidity. Cardiovascular:  
   Pulses: Normal pulses. Heart sounds: Normal heart sounds. No murmur. Pulmonary:  
   Effort: Pulmonary effort is normal. No respiratory distress. Breath sounds: Normal breath sounds. Abdominal:  
   General: Bowel sounds are normal. There is no distension. Palpations: Abdomen is soft. There is no mass. Tenderness: There is no abdominal tenderness. There is no guarding. Musculoskeletal:     
   General: No swelling or tenderness. Lymphadenopathy:  
   Cervical: No cervical adenopathy. Skin: 
   General: Skin is warm. Findings: No rash. Neurological:  
   Mental Status: He is alert and oriented to person, place, and time. Mental status is at baseline. Cranial Nerves: No cranial nerve deficit. Psychiatric:     
   Mood and Affect: Mood normal.  
 
  
 
Diagnostics: No results found for this or any previous visit (from the past 96 hour(s)). Imaging: No results found for this or any previous visit. Results for orders placed during the hospital encounter of 08/15/19 XR CHEST PA LAT Narrative EXAMINATION: Chest 2 views INDICATION: Abnormal physical exam, Rales COMPARISON: 8/15/2019 FINDINGS: Frontal lateral views of the chest obtained. Mediastinal silhouette 
and pulmonary vasculature unremarkable. No consolidation. Bilateral upper lung 
and lower lung streaky densities including some coarse and reticular appearing 
markings. No confluent consolidation. No definite pneumothorax. Biapical pleural 
shadow thickening. No obvious acute osseous findings. Impression IMPRESSION: 
 
There is a pulmonary interstitial thickening and streaky densities described 
above, felt to be at least partly due to chronic interstitial thickening. Component of interstitial infiltrate/edema also possible. Consider imaging follow-up. Results for orders placed during the hospital encounter of 03/25/19 CT HEAD WO CONT Narrative Noncontrast head CT. Indication: syncope/possible seizure. Comparison: 2/14/2018. TECHNIQUE: 4 mm axial images without contrast. Coronal and sagittal 
reconstructions. Findings: Cortical atrophy. With ischemic white matter change. No intracranial 
hemorrhage or acute infarct. Bony calvarium intact. Mucus retention cysts right 
maxillary sinus. Ethmoid sinusitis. Impression Impression: No acute intracranial abnormalities. DICOM format image data is available to non-affiliated external healthcare 
facilities or entities on a secure, media free, reciprocally searchable basis 
with patient authorization for 12 months following the date of the study. Assessment: 1. Iron deficiency anemia, unspecified iron deficiency anemia type 2. Normocytic anemia 3. Chronic anemia Plan: # Normocytic anemia # Chronic anemia # Iron deficiency anemia 
-- The patient has a past medical history significant of multiple comorbidities, chronic kidney disease, gastritis, seizure, diabetes mellitus, HTN. -- Lab reviews indicated chronic anemia since at least 10/12/2010, hemoglobin of 11.6. 
-- 7/23/2020 CBC reported hemoglobin of 9.2, hematocrit of 29.1, platelet of 077S. BUN/creatinine of 15/1.32. 
--Today we have reviewed with the patient about recent lab results. 10/16/2020 CBC reported hemoglobin of 9.2, hematocrit of 30.1, MCV of 82.7, total WBC of 8.5, and platelets of 882,102. BUN/creatinine of 18/1.49, iron saturation of 9% and ferritin of 14. Unremarkable B12/folate/LDH/haptoglobin. -- 10/16/2020 SPEP/KRISH reported an elevation of regions containing acute phase proteins suggesting an acute/subacute inflammatory response, some conditions in which this pattern has been observed include: bacterial, viral or parasitic infection; mechanical, physical or chemical trauma; and cardiac failure. The gamma globulin region is unremarkable and evidence of monoclonal protein is not apparent. -- His chronic anemia could be multifactorial, from iron deficiency status and anemia of chronic kidney disease/anemia of chronic disease Plan: 
--Given his iron deficiency with ferritin of 14, we will plan to start him on IV iron. I have also explained about potential side effect and benefit of IV iron, the patient was agreeable with the plan. We will plan to start Injectafer 750 mg IV x 2, 1 week apart. -- He will follow-up with his GI for screening colonoscopy if indicated. -- I will see the patient back in clinic in about 3 months. The patient will repeat his iron profile, ferritin and CBC prior to next appointment. Always sooner if required. No orders of the defined types were placed in this encounter. All of patient's questions answered to their apparent satisfaction. They verbally show understanding and agreement with aforementioned plan. Torrie Anand MD 
11/16/2020 About 25 minutes were spent for this encounter with more than 50% of the time spent in face-to-face counseling, discussing on diagnosis and management plan going forward, and co-ordination of care. Parts of this document has been produced using Dragon dictation system. Unrecognized errors in transcription may be present. Please do not hesitate to reach out for any questions or clarifications.  
 
 
CC: Amalia Wooyd MD

## 2020-11-19 PROBLEM — D64.9 NORMOCYTIC ANEMIA: Status: ACTIVE | Noted: 2020-01-01

## 2020-11-24 NOTE — PROGRESS NOTES
[facility] Progress Note    Date: 2020    Name: Jorge Cantu    MRN: [id number]         : 1950      Mr. Johnson was assessed and education was provided. Mr. Johnson's vitals were reviewed and patient was observed for 5 minutes prior to treatment. Visit Vitals  /68 (BP 1 Location: Right arm, BP Patient Position: At rest;Sitting)   Pulse 75   Temp 98 °F (36.7 °C)   Resp 16   SpO2 98%       Dose #1 of 2 Injectafer 750 mg IV given via peripheral line. Mr Jean Crum denies any adverse effects such as shortness of breath, hives, swelling anywhere, chest discomfort, lightheadedness. Discharge instructions including possible side effects given to patient in printed form. I instructed him to call 911 if he has any side effect other than nausea and headache. He verbalized understanding. Mr Jean Crum stayed for 30 min observation after infusion. Mr. Jean Crum tolerated the infusion, and had no complaints. Patient armband removed and shredded. Mr. Jean Crum was discharged from [place] [address] in stable condition at 0930. He is to return on 2020 at 0800 for his next appointment.     Nita Naranjo RN  2020

## 2020-11-30 NOTE — TELEPHONE ENCOUNTER
[employer] calling, wants to be sure RD is monitoring patients bp. Says last info they have Is from January and pt is on bp meds. Wants to be sure RD has some current bp readings on file. No need to call her back unless you have questions. They will continue to review pt record.

## 2020-12-01 NOTE — PROGRESS NOTES
[facility] Progress Note    Date: 2020    Name: Marli Trujillo    MRN: [id number]         : 1950      Mr. Johnson was assessed and education was provided. Mr. Johnson's vitals were reviewed and patient was observed for 5 minutes prior to treatment. Visit Vitals  /64 (BP 1 Location: Right arm, BP Patient Position: At rest;Sitting)   Pulse 69   Temp 97.6 °F (36.4 °C)   Resp 16       Dose # of 2 Injectafer 750 mg IV given via peripheral line. Mr Natalie Sullivan denies any adverse effects such as shortness of breath, hives, swelling anywhere, chest discomfort, lightheadedness. Discharge instructions including possible side effects given to patient in printed form. I instructed him to call 911 if he has any side effect other than nausea and headache. He verbalized understanding. Mr Alexus Kidd declined staying for 30 min observation after infusion. Mr. Natalie Sullivan tolerated the infusion, and had no complaints. Patient armband removed and shredded. Mr. Natalie Sullivan was discharged from [place] [address] in stable condition at 0900. He has no more [facility] appts.     Josh Ceballos RN  2020

## 2020-12-09 NOTE — TELEPHONE ENCOUNTER
Last Visit: 8/3/20 with MD Jennifer Franco  Next Appointment: 2/5/21 with MD Jennifer Franco  Previous Refill Encounter(s): 2/6/20 #180 with 3 refills    Requested Prescriptions     Pending Prescriptions Disp Refills    carvediloL (COREG) 12.5 mg tablet 180 Tab 3     Sig: Take 1 Tab by mouth two (2) times a day.

## 2020-12-09 NOTE — PROGRESS NOTES
[address] [facility]    Chief Complaint   Patient presents with    Leg Pain       History and Physical    Mr Kendy Pedersen is here for a yearly follow up surveillance visit, with remote history of aorto bi fem bypass for symptomatic claudication. He has denied any recurrent claudication. Fide Landry has continued to have occasional back pain but no leg concerns presently. He has continued to remain tobacco free and is on good risk factor control otherwise    He is being worked up for anemia.  He had an infusion but still being worked     Past Medical History:   Diagnosis Date    Anemia     fe def w gi w/u Dr Gross Proper cell carcinoma     s/p resection Dr. Alex Teixeira Cervical spine disease 2000s    Dr Maame Mora    Chronic back pain inoperable Dr. Samir Lopez and Dr. Jimenez Reid Chronic kidney disease 04/2017    saw Dr Bailey Bustamante    Colon adenoma     and diverticulosis Dr Cora Johnson 2014    Diabetes mellitus ([facility] [address] [id number]    microalbuminuria 5/14    DJD (degenerative joint disease)     Dyshidrotic eczema     Dyslipidemia     ED (erectile dysfunction)     FHx: heart disease     Gastritis     2014 Dr Cora Johnson neg h pylori    GERD (gastroesophageal reflux disease) 2014    on EGD Dr Cora Johnson    Hypertension     Left renal artery stenosis ([facility] [address] 11/30/2017    f/u duplex 11/18 by Jose E Tirado was negative    Meniere's disease     s/p surgery Dr Stanley Louis    Overweight (BMI 25.0-29.9) 11/30/2017    PAD (peripheral artery disease) ([facility] [address]     Dr Meryle Ohradha Seizure ([facility] [address] 03/2019    possible Dr Haritha Dorantes at [facility]; mri neg, EEG neg, on keppra    Syncope 02/2018    probably vasovagal [facility]     Patient Active Problem List   Diagnosis Code    Hyperlipidemia E78.5    Chronic back pain inoperable Dr. Samir Lopez and Dr. Olinda Gray M54.9, G89.29    Peripheral vascular disease s/p left iliac bpg Dr. Mariano North 10/10 I73.9    Erectile dysfunction N52.9    Arthritis, degenerative M19.90    Primary hypertension I10    GERD without esophagitis K21.9    Controlled type 2 diabetes mellitus with albuminuria E11.29, R80.9    Advance directive in chart Z78.9    Chronic kidney disease (CKD) stage G3a/A3 N18.31    Type 2 diabetes mellitus with diabetic neuropathy (HCC) E11.40    Normocytic anemia D64.9     Past Surgical History:   Procedure Laterality Date    CARDIAC SURG PROCEDURE UNLIST  10/10    NST neg EF 68%    CARDIAC SURG PROCEDURE UNLIST  02/2018    echo nl LV, ef 62%, mild mr/tr/pr, rvp 33 [facility]    HX CHOLECYSTECTOMY  2011    Dr. Jorge Blair HX COLONOSCOPY  2014    adenoma and divertics Dr Godwin Bass HX GI  2014    pill camera non bleeding ileal AVM    HX TONSILLECTOMY      NEUROLOGICAL PROCEDURE UNLISTED  03/2019    mri head contrast showed no acute lesions    SINUS SURGERY PROC UNLISTED      left ear surgery Dr Carl Miramontes for meniere's    VASCULAR SURGERY PROCEDURE UNLIST  10/10    [address] [address] left Graft Surgery  Dr. Eugene Showers  03/2019    carotids showed 13%BZLQ, <50%LICA     Current Outpatient Medications   Medication Sig Dispense Refill    diazePAM (VALIUM) 5 mg tablet TAKE 1 TABLET BY MOUTH 2 TIMES A DAY (MAXIMUM 2 TABS PER DAY) 60 Tab 0    lansoprazole (PREVACID) 30 mg capsule TAKE 1 CAPSULE BY MOUTH ONCE DAILY BEFORE BREAKFAST 90 Cap 3    atorvastatin (LIPITOR) 40 mg tablet TAKE 1 TABLET BY MOUTH ONCE DAILY 90 Tab 3    gabapentin (NEURONTIN) 600 mg tablet TAKE 1 TABLET BY MOUTH 3 TIMES A  Tab 1    metFORMIN (GLUCOPHAGE) 1,000 mg tablet TAKE 1 TABLET BY MOUTH 2 TIMES A DAY WITH MEALS 180 Tab 3    levETIRAcetam (KEPPRA) 750 mg tablet TAKE 1 TABLET BY MOUTH 2 TIMES A  Tab 3    clopidogreL (PLAVIX) 75 mg tab TAKE 1 TABLET BY MOUTH ONCE DAILY 90 Tab 3    amLODIPine (NORVASC) 10 mg tablet TAKE 1 TABLET BY MOUTH ONCE DAILY 90 Tab 3    benazepriL (LOTENSIN) 20 mg tablet TAKE 1 TABLET BY MOUTH ONCE DAILY 90 Tab 3    carvediloL (COREG) 12.5 mg tablet TAKE 1 TABLET BY MOUTH 2 TIMES A  Tab 3    naloxone (NARCAN) 4 mg/actuation nasal spray Use 1 spray intranasally into 1 nostril. Use a new Narcan nasal spray for subsequent doses and administer into alternating nostrils. May repeat every 2 to 3 minutes as needed. 1 Each 1    acetaminophen (TYLENOL) 325 mg tablet Take  by mouth every four (4) hours as needed for Pain.  aspirin 81 mg chewable tablet Take 81 mg by mouth daily.  triamcinolone (ARISTOCORT) 0.5 % topical cream Apply  to affected area two (2) times a day. use thin layer (Patient taking differently: Apply  to affected area two (2) times a day. use thin layer  Indications: applying to left ear) 60 g 3     No Known Allergies    Physical   Visit Vitals  /64 (BP 1 Location: Left arm, BP Patient Position: Sitting)   Pulse 68   Resp 18   Ht 5' 11\" (1.803 m)   Wt 155 lb (70.3 kg)   SpO2 96%   BMI 21.62 kg/m²     General:  Alert, cooperative, no distress. Wearing a mask   Head:  Normocephalic, without obvious abnormality, atraumatic. Eyes:    Conjunctivae/corneas clear. Pupils equal, round, reactive to light. Extraocular movements intact. Neck:         No jvd   Lungs:   No increased respiratory effort   Extremities: No leg edema   Pulses: No arterial insufficiency   Skin: No obvious lesions/ulcers in areas observed       Neurologic: No focal neuro deficits       Vascular studies:  PHILLIP     The right resting PHILLIP is normal. The left resting PHILLIP is normal. The right common femoral artery, popliteal artery, anterior tibial artery and posterior tibial artery has biphasic waveforms. Right toe PPG is normal. The left common femoral artery, popliteal artery, anterior tibial artery and posterior tibial artery has biphasic waveforms. Left toe PPG is normal.     History of Aorta to Lt Iliac and Aorta to Rt CFA bpg 2-6-12 and Lt VALENTINA to CFA bpg 10-11-10. The exam was compared to the study performed on 11/7/2019. No significant change. Impression/Plan:     ICD-10-CM ICD-9-CM    1. Peripheral vascular disease s/p left iliac bpg Dr. Tri Leung 10/10  I73.9 443.9    2. Carotid stenosis, asymptomatic, bilateral  I65.23 433.10      433.30      No orders of the defined types were placed in this encounter. Explained that he maintains normal perfusion of both lower extremities which with that being the case it continues to confirm good patency of his proximal revascularization  We will continue with surveillance and will do a follow-up in 1 year along with his carotids. If that remains stable I told him we could then probably transition every 2 years. He does wish to follow-up with Dr. Tri Leung at his new office location so we have given him new contact formation but will also forward his records after he signs his release form    Follow-up and Dispositions    · Return in about 1 year (around 12/9/2021). MICK Garcia    Portions of this note have been entered using voice recognition software.

## 2021-01-01 ENCOUNTER — TELEPHONE (OUTPATIENT)
Dept: INTERNAL MEDICINE CLINIC | Age: 71
End: 2021-01-01

## 2021-01-01 ENCOUNTER — PATIENT OUTREACH (OUTPATIENT)
Dept: CASE MANAGEMENT | Age: 71
End: 2021-01-01

## 2021-01-01 ENCOUNTER — LAB ONLY (OUTPATIENT)
Dept: ONCOLOGY | Age: 71
End: 2021-01-01

## 2021-01-01 ENCOUNTER — OFFICE VISIT (OUTPATIENT)
Dept: INTERNAL MEDICINE CLINIC | Age: 71
End: 2021-01-01
Payer: MEDICARE

## 2021-01-01 ENCOUNTER — APPOINTMENT (OUTPATIENT)
Dept: INTERNAL MEDICINE CLINIC | Age: 71
End: 2021-01-01

## 2021-01-01 ENCOUNTER — OFFICE VISIT (OUTPATIENT)
Dept: ONCOLOGY | Age: 71
End: 2021-01-01
Payer: MEDICARE

## 2021-01-01 VITALS
SYSTOLIC BLOOD PRESSURE: 125 MMHG | WEIGHT: 157 LBS | DIASTOLIC BLOOD PRESSURE: 67 MMHG | HEIGHT: 71 IN | TEMPERATURE: 97.5 F | BODY MASS INDEX: 21.98 KG/M2 | RESPIRATION RATE: 14 BRPM | HEART RATE: 72 BPM | OXYGEN SATURATION: 98 %

## 2021-01-01 VITALS
RESPIRATION RATE: 18 BRPM | DIASTOLIC BLOOD PRESSURE: 79 MMHG | WEIGHT: 157 LBS | SYSTOLIC BLOOD PRESSURE: 128 MMHG | HEART RATE: 74 BPM | BODY MASS INDEX: 21.9 KG/M2 | OXYGEN SATURATION: 97 % | TEMPERATURE: 97.9 F

## 2021-01-01 DIAGNOSIS — E11.40 TYPE 2 DIABETES MELLITUS WITH DIABETIC NEUROPATHY, WITHOUT LONG-TERM CURRENT USE OF INSULIN (HCC): ICD-10-CM

## 2021-01-01 DIAGNOSIS — D64.9 NORMOCYTIC ANEMIA: ICD-10-CM

## 2021-01-01 DIAGNOSIS — D64.9 CHRONIC ANEMIA: ICD-10-CM

## 2021-01-01 DIAGNOSIS — D50.9 IRON DEFICIENCY ANEMIA, UNSPECIFIED IRON DEFICIENCY ANEMIA TYPE: Primary | ICD-10-CM

## 2021-01-01 DIAGNOSIS — M62.830 BACK SPASM: ICD-10-CM

## 2021-01-01 DIAGNOSIS — Z71.89 ADVANCED DIRECTIVES, COUNSELING/DISCUSSION: ICD-10-CM

## 2021-01-01 DIAGNOSIS — N18.31 CHRONIC KIDNEY DISEASE (CKD) STAGE G3A/A3, MODERATELY DECREASED GLOMERULAR FILTRATION RATE (GFR) BETWEEN 45-59 ML/MIN/1.73 SQUARE METER AND ALBUMINURIA CREATININE RATIO GREATER THAN 300 MG/G (HCC): ICD-10-CM

## 2021-01-01 DIAGNOSIS — I73.9 PERIPHERAL VASCULAR DISEASE (HCC): ICD-10-CM

## 2021-01-01 DIAGNOSIS — E11.29 CONTROLLED TYPE 2 DIABETES MELLITUS WITH MICROALBUMINURIA, WITHOUT LONG-TERM CURRENT USE OF INSULIN (HCC): ICD-10-CM

## 2021-01-01 DIAGNOSIS — Z00.00 MEDICARE ANNUAL WELLNESS VISIT, SUBSEQUENT: Primary | ICD-10-CM

## 2021-01-01 DIAGNOSIS — I10 PRIMARY HYPERTENSION: ICD-10-CM

## 2021-01-01 DIAGNOSIS — K21.9 GERD WITHOUT ESOPHAGITIS: ICD-10-CM

## 2021-01-01 DIAGNOSIS — E78.5 HYPERLIPIDEMIA, UNSPECIFIED HYPERLIPIDEMIA TYPE: ICD-10-CM

## 2021-01-01 DIAGNOSIS — R80.9 CONTROLLED TYPE 2 DIABETES MELLITUS WITH MICROALBUMINURIA, WITHOUT LONG-TERM CURRENT USE OF INSULIN (HCC): ICD-10-CM

## 2021-01-01 DIAGNOSIS — F43.9 STRESS: ICD-10-CM

## 2021-01-01 LAB
ALBUMIN SERPL ELPH-MCNC: 3.5 G/DL (ref 2.9–4.4)
ALBUMIN SERPL-MCNC: 3.3 G/DL (ref 3.8–4.8)
ALBUMIN SERPL-MCNC: 3.6 G/DL (ref 3.8–4.8)
ALBUMIN/GLOB SERPL: 1.2 {RATIO} (ref 1.2–2.2)
ALBUMIN/GLOB SERPL: 1.3 {RATIO} (ref 0.7–1.7)
ALBUMIN/GLOB SERPL: 1.4 {RATIO} (ref 1.2–2.2)
ALP SERPL-CCNC: 120 IU/L (ref 39–117)
ALP SERPL-CCNC: 124 IU/L (ref 39–117)
ALPHA1 GLOB SERPL ELPH-MCNC: 0.2 G/DL (ref 0–0.4)
ALPHA2 GLOB SERPL ELPH-MCNC: 0.8 G/DL (ref 0.4–1)
ALT SERPL-CCNC: 8 IU/L (ref 0–44)
ALT SERPL-CCNC: 9 IU/L (ref 0–44)
AST SERPL-CCNC: 11 IU/L (ref 0–40)
AST SERPL-CCNC: 12 IU/L (ref 0–40)
B-GLOBULIN SERPL ELPH-MCNC: 0.7 G/DL (ref 0.7–1.3)
BASOPHILS # BLD AUTO: 0.1 X10E3/UL (ref 0–0.2)
BASOPHILS NFR BLD AUTO: 1 %
BILIRUB SERPL-MCNC: <0.2 MG/DL (ref 0–1.2)
BILIRUB SERPL-MCNC: <0.2 MG/DL (ref 0–1.2)
BUN SERPL-MCNC: 18 MG/DL (ref 8–27)
BUN SERPL-MCNC: 24 MG/DL (ref 8–27)
BUN/CREAT SERPL: 11 (ref 10–24)
BUN/CREAT SERPL: 16 (ref 10–24)
CALCIUM SERPL-MCNC: 8 MG/DL (ref 8.6–10.2)
CALCIUM SERPL-MCNC: 8.6 MG/DL (ref 8.6–10.2)
CHLORIDE SERPL-SCNC: 108 MMOL/L (ref 96–106)
CHLORIDE SERPL-SCNC: 110 MMOL/L (ref 96–106)
CHOLEST SERPL-MCNC: 112 MG/DL (ref 100–199)
CO2 SERPL-SCNC: 22 MMOL/L (ref 20–29)
CO2 SERPL-SCNC: 23 MMOL/L (ref 20–29)
CREAT SERPL-MCNC: 1.47 MG/DL (ref 0.76–1.27)
CREAT SERPL-MCNC: 1.62 MG/DL (ref 0.76–1.27)
EOSINOPHIL # BLD AUTO: 0.4 X10E3/UL (ref 0–0.4)
EOSINOPHIL NFR BLD AUTO: 5 %
ERYTHROCYTE [DISTWIDTH] IN BLOOD BY AUTOMATED COUNT: 16.1 % (ref 11.6–15.4)
ERYTHROCYTE [DISTWIDTH] IN BLOOD BY AUTOMATED COUNT: 16.9 % (ref 11.6–15.4)
ERYTHROCYTE [SEDIMENTATION RATE] IN BLOOD BY WESTERGREN METHOD: 14 MM/HR (ref 0–30)
FERRITIN SERPL-MCNC: 231 NG/ML (ref 30–400)
FOLATE SERPL-MCNC: 6.7 NG/ML
GAMMA GLOB SERPL ELPH-MCNC: 0.9 G/DL (ref 0.4–1.8)
GLOBULIN SER CALC-MCNC: 2.6 G/DL (ref 1.5–4.5)
GLOBULIN SER CALC-MCNC: 2.7 G/DL (ref 2.2–3.9)
GLOBULIN SER CALC-MCNC: 2.8 G/DL (ref 1.5–4.5)
GLUCOSE SERPL-MCNC: 127 MG/DL (ref 65–99)
GLUCOSE SERPL-MCNC: 88 MG/DL (ref 65–99)
HAPTOGLOB SERPL-MCNC: 215 MG/DL (ref 32–363)
HBA1C MFR BLD: 5.2 % (ref 4.8–5.6)
HCT VFR BLD AUTO: 34.8 % (ref 37.5–51)
HCT VFR BLD AUTO: 36.6 % (ref 37.5–51)
HDLC SERPL-MCNC: 30 MG/DL
HGB BLD-MCNC: 11.5 G/DL (ref 13–17.7)
HGB BLD-MCNC: 12.1 G/DL (ref 13–17.7)
IMM GRANULOCYTES # BLD AUTO: 0 X10E3/UL (ref 0–0.1)
IMM GRANULOCYTES NFR BLD AUTO: 1 %
INTERPRETATION, 910389: NORMAL
INTERPRETATION: NORMAL
IRON SATN MFR SERPL: 35 % (ref 15–55)
IRON SERPL-MCNC: 67 UG/DL (ref 38–169)
LDH SERPL-CCNC: 180 IU/L (ref 121–224)
LDLC SERPL CALC-MCNC: 65 MG/DL (ref 0–99)
LYMPHOCYTES # BLD AUTO: 1.8 X10E3/UL (ref 0.7–3.1)
LYMPHOCYTES NFR BLD AUTO: 22 %
M PROTEIN SERPL ELPH-MCNC: NORMAL G/DL
MCH RBC QN AUTO: 30.7 PG (ref 26.6–33)
MCH RBC QN AUTO: 30.8 PG (ref 26.6–33)
MCHC RBC AUTO-ENTMCNC: 33 G/DL (ref 31.5–35.7)
MCHC RBC AUTO-ENTMCNC: 33.1 G/DL (ref 31.5–35.7)
MCV RBC AUTO: 93 FL (ref 79–97)
MCV RBC AUTO: 93 FL (ref 79–97)
MONOCYTES # BLD AUTO: 0.5 X10E3/UL (ref 0.1–0.9)
MONOCYTES NFR BLD AUTO: 6 %
NEUTROPHILS # BLD AUTO: 5.3 X10E3/UL (ref 1.4–7)
NEUTROPHILS NFR BLD AUTO: 65 %
PDF IMAGE, 910387: NORMAL
PLATELET # BLD AUTO: 210 X10E3/UL (ref 150–450)
PLATELET # BLD AUTO: 215 X10E3/UL (ref 150–450)
PLEASE NOTE, 011150: NORMAL
POTASSIUM SERPL-SCNC: 4.7 MMOL/L (ref 3.5–5.2)
POTASSIUM SERPL-SCNC: 5.2 MMOL/L (ref 3.5–5.2)
PROT SERPL-MCNC: 6.1 G/DL (ref 6–8.5)
PROT SERPL-MCNC: 6.2 G/DL (ref 6–8.5)
RBC # BLD AUTO: 3.74 X10E6/UL (ref 4.14–5.8)
RBC # BLD AUTO: 3.93 X10E6/UL (ref 4.14–5.8)
RETICS/RBC NFR AUTO: 1.5 % (ref 0.6–2.6)
SODIUM SERPL-SCNC: 140 MMOL/L (ref 134–144)
SODIUM SERPL-SCNC: 143 MMOL/L (ref 134–144)
TIBC SERPL-MCNC: 191 UG/DL (ref 250–450)
TRIGL SERPL-MCNC: 86 MG/DL (ref 0–149)
UIBC SERPL-MCNC: 124 UG/DL (ref 111–343)
VIT B12 SERPL-MCNC: 336 PG/ML (ref 232–1245)
VLDLC SERPL CALC-MCNC: 17 MG/DL (ref 5–40)
WBC # BLD AUTO: 7.1 X10E3/UL (ref 3.4–10.8)
WBC # BLD AUTO: 8.1 X10E3/UL (ref 3.4–10.8)

## 2021-01-01 PROCEDURE — G8536 NO DOC ELDER MAL SCRN: HCPCS | Performed by: INTERNAL MEDICINE

## 2021-01-01 PROCEDURE — 99214 OFFICE O/P EST MOD 30 MIN: CPT | Performed by: INTERNAL MEDICINE

## 2021-01-01 PROCEDURE — 99497 ADVNCD CARE PLAN 30 MIN: CPT | Performed by: INTERNAL MEDICINE

## 2021-01-01 PROCEDURE — G8752 SYS BP LESS 140: HCPCS | Performed by: INTERNAL MEDICINE

## 2021-01-01 PROCEDURE — G8420 CALC BMI NORM PARAMETERS: HCPCS | Performed by: INTERNAL MEDICINE

## 2021-01-01 PROCEDURE — G8754 DIAS BP LESS 90: HCPCS | Performed by: INTERNAL MEDICINE

## 2021-01-01 PROCEDURE — 1101F PT FALLS ASSESS-DOCD LE1/YR: CPT | Performed by: INTERNAL MEDICINE

## 2021-01-01 PROCEDURE — 2022F DILAT RTA XM EVC RTNOPTHY: CPT | Performed by: INTERNAL MEDICINE

## 2021-01-01 PROCEDURE — 3017F COLORECTAL CA SCREEN DOC REV: CPT | Performed by: INTERNAL MEDICINE

## 2021-01-01 PROCEDURE — G8510 SCR DEP NEG, NO PLAN REQD: HCPCS | Performed by: INTERNAL MEDICINE

## 2021-01-01 PROCEDURE — 3044F HG A1C LEVEL LT 7.0%: CPT | Performed by: INTERNAL MEDICINE

## 2021-01-01 PROCEDURE — G0439 PPPS, SUBSEQ VISIT: HCPCS | Performed by: INTERNAL MEDICINE

## 2021-01-01 PROCEDURE — G8427 DOCREV CUR MEDS BY ELIG CLIN: HCPCS | Performed by: INTERNAL MEDICINE

## 2021-01-01 RX ORDER — DIAZEPAM 10 MG/1
10 TABLET ORAL
Qty: 60 TAB | Refills: 1 | Status: SHIPPED | OUTPATIENT
Start: 2021-01-01 | End: 2021-01-01

## 2021-01-01 RX ORDER — BENAZEPRIL HYDROCHLORIDE 20 MG/1
TABLET ORAL
Qty: 90 TAB | Refills: 3 | Status: SHIPPED | OUTPATIENT
Start: 2021-01-01 | End: 2021-01-01

## 2021-01-01 RX ORDER — DIAZEPAM 5 MG/1
TABLET ORAL
Qty: 60 TAB | Refills: 0 | Status: SHIPPED | OUTPATIENT
Start: 2021-01-01 | End: 2021-01-01 | Stop reason: DRUGHIGH

## 2021-01-01 RX ORDER — AMLODIPINE BESYLATE 10 MG/1
TABLET ORAL
Qty: 90 TAB | Refills: 3 | Status: SHIPPED | OUTPATIENT
Start: 2021-01-01 | End: 2021-01-01

## 2021-01-01 RX ORDER — CLOPIDOGREL BISULFATE 75 MG/1
TABLET ORAL
Qty: 90 TAB | Refills: 3 | Status: SHIPPED | OUTPATIENT
Start: 2021-01-01 | End: 2021-01-01

## 2021-01-30 NOTE — PROGRESS NOTES
Jud Murrell is a 79 y.o. male prsents for eval. 
 
Assessment & Plan:  
Diagnoses and all orders for this visit: 
 
1. Back spasm 
-     diazePAM (VALIUM) 10 mg tablet; Take 1 Tab by mouth every twelve (12) hours as needed for Anxiety or Muscle Spasm(s). Max Daily Amount: 20 mg. 
 
2. Peripheral vascular disease s/p left iliac bpg Dr. Phani Kidd 10/10 3. Primary hypertension 4. GERD without esophagitis 5. Controlled type 2 diabetes mellitus with albuminuria 6. Type 2 diabetes mellitus with diabetic neuropathy, without long-term current use of insulin ([facility] [address] -     METABOLIC PANEL, BASIC; Future -     MICROALBUMIN, UR, RAND W/ MICROALB/CREAT RATIO; Future 
-     HEMOGLOBIN A1C W/O EAG; Future 7. Chronic kidney disease (CKD) stage G3a/A3 8. Hyperlipidemia, unspecified hyperlipidemia type 9. Medicare annual wellness visit, subsequent 10. Advanced directives, counseling/discussion No seizure since the last visit No cardiovascular complaints. No exercise but remains active with chores and lawn work when the weather is good. No claudication and sees Dr Phani Kidd yearly No polyuria, polydipsia, nocturia, vision change or neurologic complaints. Not checking sugars regularly. Weight is stable. Sees Dr. Karine Gimenezr No gi or gu issues He reports intermittent pain in the right knee medially. No swelling, dec rom, warmth, reports no injuries He feels better with the iron infusion with Dr Zuñiga Or LAST MEDICARE WELLNESS EXAM: 7/13/16, 7/20/17, 10/3/18, 1/29/20, 2/5/21 Past Medical History:  
Diagnosis Date  Anemia, iron deficiency   
 gi w/u Dr Iran Primrose; Dr Zuñiga Or s/p iron infusion 2020  Basal cell carcinoma   
 s/p resection Dr. Pretty Mcduffie  Chronic back pain inoperable Dr. Irving Boland and Dr. Violeta Dejesus  Chronic kidney disease 04/2017  
 saw Dr Víctor Villatoro  Colon adenoma   
 and diverticulosis Dr Iran Primrose 2014  Degenerative arthritis of cervical spine 2000s Dr Odalys Maldonado  Diabetes mellitus ([facility] [address] [id number]  
 microalbuminuria 5/14  Dyshidrotic eczema  Dyslipidemia  ED (erectile dysfunction)  FHx: heart disease  Gastritis 2014 Dr Jorge Gates neg h pylori  GERD (gastroesophageal reflux disease) 2014  
 on EGD Dr Jorge Gates  Hypertension  Left renal artery stenosis ([facility] [address] 11/30/2017  
 f/u duplex 11/18 by vasc was negative  Meniere's disease   
 s/p surgery Dr Kristian Mendez  Overweight (BMI 25.0-29.9) 11/30/2017  PAD (peripheral artery disease) ([facility] [address] Dr Palma Lee  Rosacea  [facility] 03[facility]  
 possible Dr Joleen Roberts at [facility]; mri neg, EEG neg, on keppra  Syncope 02/2018  
 probably vasovagal [facility] Past Surgical History:  
Procedure Laterality Date  HX CHOLECYSTECTOMY  2011 Dr. Yas Ferrell HX COLONOSCOPY  2014  
 adenoma and divertics Dr Jorge Gates  HX GI  2014  
 pill camera non bleeding ileal AVM  HX TONSILLECTOMY  NEUROLOGICAL PROCEDURE UNLISTED  03/2019  
 mri head contrast showed no acute lesions  [place] CARDIAC SURG PROCEDURE UNLIST  10/10 NST neg EF 68%  [place] CARDIAC SURG PROCEDURE UNLIST  02/2018  
 echo nl LV, ef 62%, mild mr/tr/pr, rvp 33 [facility]  [place] SINUS SURGERY PROC UNLISTED    
 left ear surgery Dr Kristian Mendez for meniere's  VASCULAR SURGERY PROCEDURE UNLIST  10/10 [address] left Graft Surgery  Dr. Palma Lee  VASCULAR SURGERY PROCEDURE UNLIST    
 MILAGROS 83%, LICA <56% (3/94) Social History Socioeconomic History  Marital status:  Spouse name: Not on file  Number of children: 0  
 Years of education: Not on file  Highest education level: Not on file Occupational History  Occupation: [address] Social Needs  Financial resource strain: Not on file  Food insecurity Worry: Not on file Inability: Not on file  Transportation needs Medical: Not on file Non-medical: Not on file Tobacco Use  Smoking status: Former Smoker   Packs/day: 1.00  
 Years: 40.00 Pack years: 40.00 Quit date: 10/22/2011 Years since quittin.3  Smokeless tobacco: Never Used Substance and Sexual Activity  Alcohol use: No  
 Drug use: No  
 Sexual activity: Not on file Lifestyle  Physical activity Days per week: Not on file Minutes per session: Not on file  Stress: Not on file Relationships  Social connections Talks on phone: Not on file Gets together: Not on file Attends Denominational service: Not on file Active member of club or organization: Not on file Attends meetings of clubs or organizations: Not on file Relationship status: Not on file  Intimate partner violence Fear of current or ex partner: Not on file Emotionally abused: Not on file Physically abused: Not on file Forced sexual activity: Not on file Other Topics Concern  Not on file Social History Narrative  Not on file Current Outpatient Medications Medication Sig  
 diazePAM (VALIUM) 10 mg tablet Take 1 Tab by mouth every twelve (12) hours as needed for Anxiety or Muscle Spasm(s). Max Daily Amount: 20 mg.  
 carvediloL (COREG) 12.5 mg tablet Take 1 Tab by mouth two (2) times a day.  gabapentin (NEURONTIN) 600 mg tablet TAKE 1 TABLET BY MOUTH 3 TIMES A DAY  lansoprazole (PREVACID) 30 mg capsule TAKE 1 CAPSULE BY MOUTH ONCE DAILY BEFORE BREAKFAST  atorvastatin (LIPITOR) 40 mg tablet TAKE 1 TABLET BY MOUTH ONCE DAILY  metFORMIN (GLUCOPHAGE) 1,000 mg tablet TAKE 1 TABLET BY MOUTH 2 TIMES A DAY WITH MEALS  
 levETIRAcetam (KEPPRA) 750 mg tablet TAKE 1 TABLET BY MOUTH 2 TIMES A DAY  clopidogreL (PLAVIX) 75 mg tab TAKE 1 TABLET BY MOUTH ONCE DAILY  amLODIPine (NORVASC) 10 mg tablet TAKE 1 TABLET BY MOUTH ONCE DAILY  benazepriL (LOTENSIN) 20 mg tablet TAKE 1 TABLET BY MOUTH ONCE DAILY  naloxone (NARCAN) 4 mg/actuation nasal spray Use 1 spray intranasally into 1 nostril. Use a new Narcan nasal spray for subsequent doses and administer into alternating nostrils. May repeat every 2 to 3 minutes as needed.  acetaminophen (TYLENOL) 325 mg tablet Take  by mouth every four (4) hours as needed for Pain.  aspirin 81 mg chewable tablet Take 81 mg by mouth daily.  triamcinolone (ARISTOCORT) 0.5 % topical cream Apply  to affected area two (2) times a day. use thin layer (Patient taking differently: Apply  to affected area two (2) times a day. use thin layer  Indications: applying to left ear) No current facility-administered medications for this visit. No Known Allergies REVIEW OF SYSTEMS: sees Dr. José Miguel Funez, no podiatry, colo 2014 Dr Roy Led Ophtho  no vision change or eye pain Oral  no mouth pain, tongue or tooth problems Ears  no hearing loss, ear pain, fullness, no swallowing problems Cardiac  no CP, PND, orthopnea, edema, palpitations or syncope Chest  no breast masses Resp  no wheezing, chronic coughing, dyspnea GI  no heartburn, nausea, vomiting, change in bowel habits, bleeding, hemorrhoids Urinary  no dysuria, hematuria, flank pain, urgency, frequency Visit Vitals /67 Pulse 72 Temp 97.5 °F (36.4 °C) (Temporal) Resp 14 Ht 5' 11\" (1.803 m) Wt 157 lb (71.2 kg) SpO2 98% BMI 21.90 kg/m² A&O x3 Affect is appropriate. Mood stable No apparent distress Anicteric, no JVD, adenopathy or thyromegaly. No carotid bruits or radiated murmur Lungs clear to auscultation, no wheezes or rales Heart showed regular rate and rhythm. No murmur, rubs, gallops Abdomen soft nontender, no hepatosplenomegaly or masses. Extremities without edema. Pulses 1-2+ symmetrically LABS From 10/11 showed gluc 99,  cr 0.98, gfr 83, alt 43,  hba1c 5.6, ldl-p 1018, chol 124, tg 223, hdl 37, ldl-c 42, umar 11.2, tsh 2.10,          psa 0.90 From 4/12 showed                  hba1c 5.8, ldl-p 1136, chol 143, tg 188, hdl 38, ldl-c 67, umar 9.5 From 10/12 showed gluc 87,  cr 0.99, gfr 81, alt 15,  hba1c 5.6, ldl-p 500,   chol 109, tg 125, hdl 40, ldl-c 44 From 7/13 showed                  hba1c 5.7,                   chol 136, tg 137, hdl 42, ldl-c 67 From 2/14 showed                  hba1c 6.0,               chol 136, tg 72,   hdl 52, ldl-c 72, umar 347,  wbc 12.4, hb 12.0, plt 240, psa 1.39 From 5/14 showed                  hba1c 6.0,               chol 135, tg 94,   hdl 45, ldl-c 71, umar 469,  wbc 9.5,   hb 11.6, plt 232, psa 1.35 From 5/14 showed                                      fe 25, %sat 7, ferritin 32, b12 699, fol 16.1, spep neg From 6/14 showed   gluc 100, cr 1.33, gfr 54 From 8/14 showed                  hba1c 6.1,               chol 137, tg 93,   hdl 46, ldl-c 98, umar 100 From 2/15 showed   gluc 100, cr 1.50, gfr 46, alt 14, hba1c 5.9,             umar 82.1,  wbc 9.9,  hb 11.2, plt 203 From 9/15 showed   gluc 88,   cr 1.30,    alt 11, hba1c 5.6,    chol 135, tg 123, hdl 43, ldl-c 67, umar 159,   wbc 9.2,  hb 11.8, plt 225 From 3/16 showed   gluc 77,   cr 1.58, gfr 45,   hba1c 6.0,                    hep c neg, na 133 From 7/16 showed   gluc 96,   cr 1.41, gfr 52,   hba1c 5.9,             umar 163,   wbc 9.6,  hb 12.1, plt 281,                   na 131 From 1/17 showed   gluc 79,   cr 1.50, gfr 48 From 7/17 showed   gluc 92,   cr 1.57, gfr 45, alt 6,   hba1c 5.8,   chol 170, tg 135, hdl 44, ldl-c 99, umar 1043 From 11/17 showed      hba1c 5.7,   chol 156, tg 125, hdl 51, ldl-c 80, umar 779,   wbc 10.1, hb 12.0, plt 264 From 2/18 showed   gluc 83,   cr 1.40, gfr 54,                       wbc 6.2,   hb 9.8,   plt 216, fe 66 ferritin 58.6, b12 376 From 3/18 showed   gluc 82,   cr 1.69, gfr 41,   hba1c 5.7,            umar 275 From 10/18 showed gluc 86,   cr 1.51, gfr47,   hba1c 5.7,             chol 149, tg 150, hdl 33, ldl-c 86 From 1/19 showed   gluc 80,   cr 1.43, gfr 50, alt 11, hba1c 5.8,     chol 139, tg 146, hdl 37, ld-c 73,    wbc 11.6, hb 11.3, plt 303, k 5.6 From 3/19 showed   gluc 127, cr 1.40, gfr 54,            wbc 7.3,   hb 9.4,   plt 222 From 8/19 showed   gluc 88,   cr 1.48, gfr 48, alt 14,  hba1c 5.9,   chol 118, tg 147, hdl 30, ldl-c 59, umar 760,   wbc 7.2,   hb 10.1, plt 238 From 1/20 showed   gluc 76,   cr 1.52, gfr 46,    hba1c 5.4,             chol 113, tg 177, hdl 33, ldl-c 45, umar 53 From 7/20 showed   gluc 86,   cr 1.32, gfr 54,            wbc 7.9,    hb 9.2, plt 279, psa 1.30 Patient Active Problem List  
Diagnosis Code  Hyperlipidemia E78.5  Chronic back pain inoperable Dr. Dot Ward and Dr. Stefani Casarez M54.9, W05.05  
 Peripheral vascular disease s/p left iliac bpg Dr. Alondra Timmons 10/10 I73.9  Erectile dysfunction N52.9  Arthritis, degenerative M19.90  
 Primary hypertension I10  
 GERD without esophagitis K21.9  Controlled type 2 diabetes mellitus with albuminuria E11.29, R80.9  Advance directive in chart Z78.9  Chronic kidney disease (CKD) stage G3a/A3 N18.31  
 Type 2 diabetes mellitus with diabetic neuropathy (HCC) E11.40  Normocytic anemia D64.9 Assessment and plan: 1. Ménière's. Followup Dr. Darrion Gunter prn 2. Diabetes w microalbuminuria. Well-controlled on metformin alone, close watch on renal fxn 3. Hyperlipidemia. Continue current regimen. 4. Chronic back problems. Continue current. Declined w/u or tx of c spine issues 5. Vascular. F/U Dr. Alondra Timmons as directed 6. CKD. F/U Dr Gretta Sainz 7. HTN. Continue current regimen. 8. Colon adenoma. Declined referral despite long discussion 9. Possible seizures. continue keppra 10. Anemia iron def. Per Dr Rhoda Montero. He is refusing f/u colo until after the pandemic RTC 8/21 Above conditions discussed at length and patient vocalized understanding. All questions answered to patient satisfaction

## 2021-01-30 NOTE — PROGRESS NOTES
This is a subsequent Preventive Physical Examination I have reviewed the patient's medical history in detail and updated the computerized patient record. Depression Risk Factor Screening:  
 
3 most recent PHQ Screens 2/5/2021 Little interest or pleasure in doing things Not at all Feeling down, depressed, irritable, or hopeless Not at all Total Score PHQ 2 0 SCREENINGS Colonoscopy last done 2014 Dr Cammie Eldridge Immunization History Administered Date(s) Administered  (RETIRED) Pneumococcal Vaccine (Unspecified Type) 08/17/2010  Influenza High Dose Vaccine PF 01/17/2017, 11/30/2017  Influenza Vaccine 10/01/2012  Influenza Vaccine (Tri) Adjuvanted (>65 Yrs FLUAD TRI 77762) 10/03/2018, 01/29/2020  Influenza Vaccine Split 10/14/2011  Pneumococcal Conjugate (PCV-13) 07/13/2016  Pneumococcal Polysaccharide (PPSV-23) 03/30/2018  Tdap 02/17/2014  Zoster 08/18/2010 Alcohol Risk Screen Do you average more than 1 drink per night or more than 7 drinks a week: No 
 
In the past three months have you have had more than 4 drinks containing alcohol on one occasion: No 
 
 
 
Functional Ability and Level of Safety:  
 Hearing: Hearing is good. Activities of Daily Living: The home contains: no safety equipment. Patient does total self care Ambulation: with no difficulty Fall Risk: 
Fall Risk Assessment, last 12 mths 2/5/2021 Able to walk? Yes Fall in past 12 months? 0 Do you feel unsteady? 0 Are you worried about falling 0 Number of falls in past 12 months - Fall with injury? -  
 
 Abuse Screen: 
Patient is not abused Cognitive Screening Has your family/caregiver stated any concerns about your memory: no 
  
Assessment/Plan Education and counseling provided: 
Are appropriate based on today's review and evaluation End-of-Life planning (with patient's consent) Influenza Vaccine Prostate cancer screening tests (PSA, covered annually) Colorectal cancer screening tests Cardiovascular screening blood test 
 
Diagnoses and all orders for this visit: 
 
1. Back spasm 
-     diazePAM (VALIUM) 10 mg tablet; Take 1 Tab by mouth every twelve (12) hours as needed for Anxiety or Muscle Spasm(s). Max Daily Amount: 20 mg. 
 
2. Peripheral vascular disease s/p left iliac bpg Dr. Torito Sabillon 10/10 3. Primary hypertension 4. GERD without esophagitis 5. Controlled type 2 diabetes mellitus with albuminuria 6. Type 2 diabetes mellitus with diabetic neuropathy, without long-term current use of insulin ([facility] [address] -     METABOLIC PANEL, BASIC; Future -     MICROALBUMIN, UR, RAND W/ MICROALB/CREAT RATIO; Future 
-     HEMOGLOBIN A1C W/O EAG; Future 7. Chronic kidney disease (CKD) stage G3a/A3 8. Hyperlipidemia, unspecified hyperlipidemia type 9. Medicare annual wellness visit, subsequent 10. Advanced directives, counseling/discussion 
 
lab results and schedule of future lab studies reviewed with patient 
cardiovascular risk and specific lipid/LDL goals reviewed. End of life discussion undertaken. Has medical directive in place Flu high dose when in season 
shingrix advocated Colonoscopy due but he declined to go until the pandemic ends Health Maintenance Due Health Maintenance Due Topic Date Due  
 COVID-19 Vaccine (1 of 2) 07/11/1966  Shingrix Vaccine Age 50> (1 of 2) 07/11/2000  Foot Exam Q1  03/31/2019  Colorectal Cancer Screening Combo  06/18/2019  Flu Vaccine (1) 09/01/2020 Patient Care Team  
Patient Care Team: 
Demetria Henning MD as PCP - General (Internal Medicine) Demetria Henning MD as PCP - REHABILITATION [facility] Provider Cathlene Schaumann, PA (Vascular Surgery) Fadia Warren MD (Ophthalmology) Wendy Brandt MD (Neurology) History Patient Active Problem List  
Diagnosis Code  Hyperlipidemia E78.5  Chronic back pain inoperable Dr. Oma George and Dr. Freddy Das M54.9, R33.17  
  Peripheral vascular disease s/p left iliac bpg Dr. Jose De Jesus Stubbs 10/10 I73.9  Erectile dysfunction N52.9  Arthritis, degenerative M19.90  
 Primary hypertension I10  
 GERD without esophagitis K21.9  Controlled type 2 diabetes mellitus with albuminuria E11.29, R80.9  Advance directive in chart Z78.9  Chronic kidney disease (CKD) stage G3a/A3 N18.31  
 Type 2 diabetes mellitus with diabetic neuropathy (HCC) E11.40  Normocytic anemia D64.9 Past Medical History:  
Diagnosis Date  Anemia   
 fe def w gi w/u Dr Zohreh Samano  Basal cell carcinoma   
 s/p resection Dr. Raymond Clemons  Chronic back pain inoperable Dr. Cate Hernandez and Dr. Shelly Almonte  Chronic kidney disease 04/2017  
 saw Dr Layla Mata  Colon adenoma   
 and diverticulosis Dr Zohreh Samano 2014  Degenerative arthritis of cervical spine 2000s Dr Tracie Kohler  Diabetes mellitus ([facility] [address] [id number]  
 microalbuminuria 5/14  Dyshidrotic eczema  Dyslipidemia  ED (erectile dysfunction)  FHx: heart disease  Gastritis 2014 Dr Zohreh Samano neg h pylori  GERD (gastroesophageal reflux disease) 2014  
 on EGD Dr Zohreh Samano  Hypertension  Left renal artery stenosis ([facility] [address] 11/30/2017  
 f/u duplex 11/18 by vasc was negative  Meniere's disease   
 s/p surgery Dr Kuldip Gonzalez  Overweight (BMI 25.0-29.9) 11/30/2017  PAD (peripheral artery disease) ([facility] [address] Dr Jose De Jesus Stubbs  Rosacea  Seizure ([facility] [address] 03/2019  
 possible Dr Manny Valadez at [facility]; mri neg, EEG neg, on keppra  Syncope 02/2018  
 probably vasovagal [facility] Past Surgical History:  
Procedure Laterality Date  HX CHOLECYSTECTOMY  2011 Dr. Charisse Flores HX COLONOSCOPY  2014  
 adenoma and divertics Dr Zohreh Samano  HX GI  2014  
 pill camera non bleeding ileal AVM  HX TONSILLECTOMY  NEUROLOGICAL PROCEDURE UNLISTED  03/2019  
 mri head contrast showed no acute lesions  [place] CARDIAC SURG PROCEDURE UNLIST  10/10 NST neg EF 68%  [place] CARDIAC SURG PROCEDURE UNLIST  02/2018 echo nl LV, ef 62%, mild mr/tr/pr, rvp 33 [id number]  [place] SINUS SURGERY PROC UNLISTED    
 left ear surgery Dr Patrick Adame for meniere's  VASCULAR SURGERY PROCED[facility] UN[facility]  [facility]  Dr. Yuriy Guerra  VASCULAR SURGERY PROCEDURE UNLIST    
 MILAGROS 76%, LICA <08% (8/39) Current Outpatient Medications Medication Sig Dispense Refill  diazePAM (VALIUM) 10 mg tablet Take 1 Tab by mouth every twelve (12) hours as needed for Anxiety or Muscle Spasm(s). Max Daily Amount: 20 mg. 60 Tab 1  carvediloL (COREG) 12.5 mg tablet Take 1 Tab by mouth two (2) times a day. 180 Tab 3  
 gabapentin (NEURONTIN) 600 mg tablet TAKE 1 TABLET BY MOUTH 3 TIMES A  Tab 1  
 lansoprazole (PREVACID) 30 mg capsule TAKE 1 CAPSULE BY MOUTH ONCE DAILY BEFORE BREAKFAST 90 Cap 3  
 atorvastatin (LIPITOR) 40 mg tablet TAKE 1 TABLET BY MOUTH ONCE DAILY 90 Tab 3  
 metFORMIN (GLUCOPHAGE) 1,000 mg tablet TAKE 1 TABLET BY MOUTH 2 TIMES A DAY WITH MEALS 180 Tab 3  
 levETIRAcetam (KEPPRA) 750 mg tablet TAKE 1 TABLET BY MOUTH 2 TIMES A  Tab 3  clopidogreL (PLAVIX) 75 mg tab TAKE 1 TABLET BY MOUTH ONCE DAILY 90 Tab 3  
 amLODIPine (NORVASC) 10 mg tablet TAKE 1 TABLET BY MOUTH ONCE DAILY 90 Tab 3  
 benazepriL (LOTENSIN) 20 mg tablet TAKE 1 TABLET BY MOUTH ONCE DAILY 90 Tab 3  
 naloxone (NARCAN) 4 mg/actuation nasal spray Use 1 spray intranasally into 1 nostril. Use a new Narcan nasal spray for subsequent doses and administer into alternating nostrils. May repeat every 2 to 3 minutes as needed. 1 Each 1  
 acetaminophen (TYLENOL) 325 mg tablet Take  by mouth every four (4) hours as needed for Pain.  aspirin 81 mg chewable tablet Take 81 mg by mouth daily.  triamcinolone (ARISTOCORT) 0.5 % topical cream Apply  to affected area two (2) times a day. use thin layer (Patient taking differently: Apply  to affected area two (2) times a day. use thin layer  Indications: applying to left ear) 60 g 3 No Known Allergies Family History Problem Relation Age of Onset  Heart Disease Mother  Stroke Father Social History Tobacco Use  Smoking status: Former Smoker Packs/day: 1.00 Years: 40.00 Pack years: 40.00 Quit date: 10/22/2011 Years since quittin.3  Smokeless tobacco: Never Used Substance Use Topics  Alcohol use:  No

## 2021-02-05 NOTE — PROGRESS NOTES
Cameron Michelle presents today for Chief Complaint Patient presents with [facility] Annual Wellness Visit  Diabetes 6 month f/u labs Depression Screening: 
3 most recent PHQ Screens 2/5/2021 Little interest or pleasure in doing things Not at all Feeling down, depressed, irritable, or hopeless Not at all Total Score PHQ 2 0 Learning Assessment: 
Learning Assessment 12/9/2020 PRIMARY LEARNER Patient HIGHEST LEVEL OF EDUCATION - PRIMARY LEARNER  -  
BARRIERS PRIMARY LEARNER -  
CO-LEARNER CAREGIVER No  
PRIMARY LANGUAGE ENGLISH  
LEARNER PREFERENCE PRIMARY LISTENING  
ANSWERED BY patient RELATIONSHIP SELF Abuse Screening: 
Abuse Screening Questionnaire 2/5/2021 Do you ever feel afraid of your partner? Ivin Drafts Are you in a relationship with someone who physically or mentally threatens you? Ivin Drafts Is it safe for you to go home? Sharon Charlton Fall Risk Fall Risk Assessment, last 12 mths 2/5/2021 Able to walk? Yes Fall in past 12 months? 0 Do you feel unsteady? 0 Are you worried about falling 0 Number of falls in past 12 months - Fall with injury? -  
 
 
 
 
Coordination of Care: 1. Have you been to the ER, urgent care clinic since your last visit? Hospitalized since your last visit? no 
 
2. Have you seen or consulted any other health care providers outside of the [address] [place] [address] since your last visit? Include any pap smears or colon screening.  no

## 2021-02-06 NOTE — PATIENT INSTRUCTIONS
Medicare Wellness Visit, Male The best way to live healthy is to have a lifestyle where you eat a well-balanced diet, exercise regularly, limit alcohol use, and quit all forms of tobacco/nicotine, if applicable. Regular preventive services are another way to keep healthy. Preventive services (vaccines, screening tests, monitoring & exams) can help personalize your care plan, which helps you manage your own care. Screening tests can find health problems at the earliest stages, when they are easiest to treat. [facility] follows the current, evidence-based guidelines published by the [facility] Cristian Kevin ([facility]) when recommending preventive services for our patients. Because we follow these guidelines, sometimes recommendations change over time as research supports it. (For example, a prostate screening blood test is no longer routinely recommended for men with no symptoms). Of course, you and your doctor may decide to screen more often for some diseases, based on your risk and co-morbidities (chronic disease you are already diagnosed with). Preventive services for you include: - Medicare offers their members a free annual wellness visit, which is time for you and your primary care provider to discuss and plan for your preventive service needs. Take advantage of this benefit every year! 
-All adults over age 72 should receive the recommended pneumonia vaccines. Current USPSTF guidelines recommend a series of two vaccines for the best pneumonia protection.  
-All adults should have a flu vaccine yearly and tetanus vaccine every 10 years. 
-All adults age 48 and older should receive the shingles vaccines (series of two vaccines). -All adults age 38-68 who are overweight should have a diabetes screening test once every three years. -Other screening tests & preventive services for persons with diabetes include: an eye exam to screen for diabetic retinopathy, a kidney function test, a foot exam, and stricter control over your cholesterol.  
-Cardiovascular screening for adults with routine risk involves an electrocardiogram (ECG) at intervals determined by the provider.  
-Colorectal cancer screening should be done for adults age 54-65 with no increased risk factors for colorectal cancer. There are a number of acceptable methods of screening for this type of cancer. Each test has its own benefits and drawbacks. Discuss with your provider what is most appropriate for you during your annual wellness visit. The different tests include: colonoscopy (considered the best screening method), a fecal occult blood test, a fecal DNA test, and sigmoidoscopy. 
-All adults born between [facility] should be screened once for Hepatitis C. 
-An Abdominal Aortic Aneurysm (AAA) Screening is recommended for men age 73-68 who has ever smoked in their lifetime. Here is a list of your current Health Maintenance items (your personalized list of preventive services) with a due date: 
Health Maintenance Due Topic Date Due  
 COVID-19 Vaccine (1 of 2) 07/11/1966  Shingles Vaccine (1 of 2) 07/11/2000 [facility]  03/31/2019  Colorectal Screening  06/18/2019  Yearly Flu Vaccine (1) 09/01/2020

## 2021-02-06 NOTE — ACP (ADVANCE CARE PLANNING)
Advance Care Planning General Advance Care Planning (ACP) Conversation Date of Conversation: 2/5/2021 Conducted with: Patient with Decision Making Capacity Healthcare Decision Maker:  
 
Click here to complete [address] including selection of the Healthcare Decision Maker Relationship (ie \"Primary\") Today we documented Decision Maker(s) consistent with Legal Next of Kin hierarchy. Content/Action Overview: Has ACP document(s) on file - reflects the patient's care preferences Reviewed DNR/DNI and patient elects Full Code (Attempt Resuscitation) Topics discussed: ventilation preferences, hospitalization preferences and resuscitation preferences Additional Comments: none Length of Voluntary ACP Conversation in minutes:  16 minutes Nayana Matias MD

## 2021-02-15 NOTE — PROGRESS NOTES
Hematology/Oncology Note Date: 2021 Name: Adwoa Wick : 1950 Lucy Russell MD  
 
 
 
Subjective: Chief complaint: Anemia History of Present Illness: Mr. Aramis Bernard is a most pleasant 79y.o. year old male who was seen initially for consultation of Anemia. The patient was accompanied by his wife during this visit. The patient has a past medical history significant of multiple comorbidities, chronic kidney disease, gastritis, seizure, diabetes mellitus, HTN. The patient reported chronic fatigue for months with low energy level. CBC on 2020 reported hemoglobin of 9.2, hematocrit of 29.1, platelet of 826S. BUN/creatinine of 15/1. 32. Lab reviews indicated chronic anemia since at least 10/12/2010, hemoglobin of 11.6. Since last visit he reported feeling better after IV Iron. Denied fever, chills, night sweat, unintentional weight loss, skin lumps or bumps, acute bleeding or bruising issues. No acute bleeding, blood in stool, dark stool, melena, hematochezia, hemoptysis, dark urine, or easily bruising. Denied headache, acute vision change, dizziness, chest pain, worsen shortness of breath, palpitation, productive cough, nausea, vomiting, abdominal pain, altered bowel habits, dysuria, new bone pain or back pain, focal numbness or weakness. Oncologic History Past Medical History, Family History, and Social History: 
 
Past Medical History:  
Diagnosis Date  Anemia, iron deficiency   
 gi w/u Dr Yuliana Blanco; Dr Reino Apley s/p iron infusion   Basal cell carcinoma   
 s/p resection Dr. Jah Avila  Chronic back pain inoperable Dr. Amando Page and Dr. Randy Atkinson  Chronic kidney disease 2017  
 saw Dr Christina Bush  Colon adenoma   
 and diverticulosis Dr Yuliana Blanco   Degenerative arthritis of cervical spine  Dr Zuleyma Camp  Diabetes mellitus ([facility] [address] [id number]  
 microalbuminuria   Dyshidrotic eczema  Dyslipidemia  ED (erectile dysfunction)  FHx: heart disease  Gastritis  Dr Zohreh Samano neg h pylori  GERD (gastroesophageal reflux disease)   
 on EGD Dr Zohreh Samano  Hypertension  Left renal artery stenosis ([facility] [address] 2017  
 f/u duplex  by vasc was negative  Meniere's disease   
 s/p surgery Dr Kuldip Gonzalez  Overweight (BMI 25.0-29.9) 2017  PAD (peripheral artery disease) ([facility] [address] Dr Jose De Jesus Stubbs  Rosacea  Seizure ([facility] [address] 2019  
 possible Dr Manny Valadez at [facility]; mri neg, EEG neg, on keppra  Syncope 2018  
 probably vasovagal [facility] Past Surgical History:  
Procedure Laterality Date  HX CHOLECYSTECTOMY   Dr. Charisse Flores HX COLONOSCOPY    
 adenoma and divertics Dr Zohreh Samano  HX GI  2014  
 pill camera non bleeding ileal AVM  HX TONSILLECTOMY  NEUROLOGICAL PROCEDURE UNLISTED  2019  
 mri head contrast showed no acute lesions  NM CARDIAC SURG PROCEDURE UNLIST  10/10 NST neg EF 68%  NM CARDIAC SURG PROCEDURE UNLIST  2018  
 echo nl LV, ef 62%, mild mr/tr/pr, rvp 33 [facility]  NM SINUS SURGERY PROC UNLISTED    
 left ear surgery Dr Kuldip Gonzalez for meniere's  VASCULAR SURGERY PROCEDURE UNLIST  10/10 [address] left Graft Surgery  Dr. Jose De Jesus Stubbs  VASCULAR SURGERY PROCEDURE UNLIST    
 MILAGROS 97%, LICA <63% (3/38) Social History Socioeconomic History  Marital status:  Spouse name: Not on file  Number of children: 0  
 Years of education: Not on file  Highest education level: Not on file Occupational History  Occupation: [address] Social Needs  Financial resource strain: Not on file  Food insecurity Worry: Not on file Inability: Not on file  Transportation needs Medical: Not on file Non-medical: Not on file Tobacco Use  Smoking status: Former Smoker Packs/day: 1.00 Years: 40.00 Pack years: 40.00 Quit date: 10/22/2011 Years since quittin.3  Smokeless tobacco: Never Used Substance and Sexual Activity  Alcohol use: No  
 Drug use: No  
 Sexual activity: Not on file Lifestyle  Physical activity Days per week: Not on file Minutes per session: Not on file  Stress: Not on file Relationships  Social connections Talks on phone: Not on file Gets together: Not on file Attends Anabaptist service: Not on file Active member of club or organization: Not on file Attends meetings of clubs or organizations: Not on file Relationship status: Not on file  Intimate partner violence Fear of current or ex partner: Not on file Emotionally abused: Not on file Physically abused: Not on file Forced sexual activity: Not on file Other Topics Concern  Not on file Social History Narrative  Not on file Family History Problem Relation Age of Onset  Heart Disease Mother  Stroke Father Current Outpatient Medications Medication Sig Dispense Refill  diazePAM (VALIUM) 10 mg tablet Take 1 Tab by mouth every twelve (12) hours as needed for Anxiety or Muscle Spasm(s). Max Daily Amount: 20 mg. 60 Tab 1  carvediloL (COREG) 12.5 mg tablet Take 1 Tab by mouth two (2) times a day. 180 Tab 3  
 gabapentin (NEURONTIN) 600 mg tablet TAKE 1 TABLET BY MOUTH 3 TIMES A  Tab 1  
 lansoprazole (PREVACID) 30 mg capsule TAKE 1 CAPSULE BY MOUTH ONCE DAILY BEFORE BREAKFAST 90 Cap 3  
 atorvastatin (LIPITOR) 40 mg tablet TAKE 1 TABLET BY MOUTH ONCE DAILY 90 Tab 3  
 metFORMIN (GLUCOPHAGE) 1,000 mg tablet TAKE 1 TABLET BY MOUTH 2 TIMES A DAY WITH MEALS 180 Tab 3  
 levETIRAcetam (KEPPRA) 750 mg tablet TAKE 1 TABLET BY MOUTH 2 TIMES A  Tab 3  clopidogreL (PLAVIX) 75 mg tab TAKE 1 TABLET BY MOUTH ONCE DAILY 90 Tab 3  
 amLODIPine (NORVASC) 10 mg tablet TAKE 1 TABLET BY MOUTH ONCE DAILY 90 Tab 3  
 benazepriL (LOTENSIN) 20 mg tablet TAKE 1 TABLET BY MOUTH ONCE DAILY 90 Tab 3  
  naloxone (NARCAN) 4 mg/actuation nasal spray Use 1 spray intranasally into 1 nostril. Use a new Narcan nasal spray for subsequent doses and administer into alternating nostrils. May repeat every 2 to 3 minutes as needed. 1 Each 1  
 acetaminophen (TYLENOL) 325 mg tablet Take  by mouth every four (4) hours as needed for Pain.  aspirin 81 mg chewable tablet Take 81 mg by mouth daily.  triamcinolone (ARISTOCORT) 0.5 % topical cream Apply  to affected area two (2) times a day. use thin layer (Patient taking differently: Apply  to affected area two (2) times a day. use thin layer  Indications: applying to left ear) 60 g 3 Review of Systems Constitutional: Negative for chills, diaphoresis, fever, malaise/fatigue and weight loss. Respiratory: Negative for cough, hemoptysis, shortness of breath and wheezing. Cardiovascular: Negative for chest pain, palpitations and leg swelling. Gastrointestinal: Negative for abdominal pain, diarrhea, heartburn, nausea and vomiting. Genitourinary: Negative for dysuria, frequency, hematuria and urgency. Musculoskeletal: Negative for joint pain and myalgias. Skin: Negative for itching and rash. Neurological: Negative for dizziness, seizures, weakness and headaches. Psychiatric/Behavioral: Negative for depression. The patient does not have insomnia. Objective:  
 
Visit Vitals /79 (BP Patient Position: Sitting) Pulse 74 Temp 97.9 °F (36.6 °C) (Oral) Resp 18 Wt 71.2 kg (157 lb) SpO2 97% BMI 21.90 kg/m² ECOG Performance Status (grade): 1 
0 - able to carry on all pre-disease activity w/out restriction 1 - restricted but able to carry out light work 2 - ambulatory and can self- care but unable to carry out work 3 - bed or chair >50% of waking hours 4 - completely disable, total care, confined to bed or chair Physical Exam 
Constitutional:   
   General: He is not in acute distress.  
HENT:  
 Head: Normocephalic and atraumatic. Eyes:  
   Pupils: Pupils are equal, round, and reactive to light. Neck: Musculoskeletal: Neck supple. No neck rigidity. Cardiovascular:  
   Pulses: Normal pulses. Heart sounds: Normal heart sounds. No murmur. Pulmonary:  
   Effort: Pulmonary effort is normal. No respiratory distress. Breath sounds: Normal breath sounds. Abdominal:  
   General: Bowel sounds are normal. There is no distension. Palpations: Abdomen is soft. There is no mass. Tenderness: There is no abdominal tenderness. There is no guarding. Musculoskeletal:     
   General: No swelling or tenderness. Lymphadenopathy:  
   Cervical: No cervical adenopathy. Skin: 
   General: Skin is warm. Findings: No rash. Neurological:  
   Mental Status: He is alert and oriented to person, place, and time. Mental status is at baseline. Cranial Nerves: No cranial nerve deficit. Psychiatric:     
   Mood and Affect: Mood normal.  
 
  
 
Diagnostics: No results found for this or any previous visit (from the past 96 hour(s)). Imaging: No results found for this or any previous visit. Results for orders placed during the hospital encounter of 08/15/19 XR CHEST PA LAT Narrative EXAMINATION: Chest 2 views INDICATION: Abnormal physical exam, [place]ales COMPARISON: 8/15/2019 FINDINGS: Frontal lateral views of the chest obtained. Mediastinal silhouette 
and pulmonary vasculature unremarkable. No consolidation. Bilateral upper lung 
and lower lung streaky densities including some coarse and reticular appearing 
markings. No confluent consolidation. No definite pneumothorax. Biapical pleural 
shadow thickening. No obvious acute osseous findings. Impression IMPRESSION: 
 
There is a pulmonary interstitial thickening and streaky densities described 
above, felt to be at least partly due to chronic interstitial thickening. Component of interstitial infiltrate/edema also possible. Consider imaging 
follow-up. Results for orders placed during the hospital encounter of 03/25/19 CT HEAD WO CONT Narrative Noncontrast head CT. Indication: syncope/possible seizure. Comparison: 2/14/2018. TECHNIQUE: 4 mm axial images without contrast. Coronal and sagittal 
reconstructions. Findings: Cortical atrophy. With ischemic white matter change. No intracranial 
hemorrhage or acute infarct. Bony calvarium intact. Mucus retention cysts right 
maxillary sinus. Ethmoid sinusitis. Impression Impression: No acute intracranial abnormalities. DICOM format image data is available to non-affiliated external healthcare 
facilities or entities on a secure, media free, reciprocally searchable basis 
with patient authorization for 12 months following the date of the study. Assessment: 1. Iron deficiency anemia, unspecified iron deficiency anemia type 2. Normocytic anemia 3. Chronic anemia Plan: # Normocytic anemia # Chronic anemia # Iron deficiency anemia 
-- The patient has a past medical history significant of multiple comorbidities, chronic kidney disease, gastritis, seizure, diabetes mellitus, HTN. -- 10/16/2020 CBC reported hemoglobin of 9.2, hematocrit of 30.1, MCV of 82.7, total WBC of 8.5, and platelets of 894,477. Iron saturation of 9% and ferritin of 14. Unremarkable B12/folate/LDH/haptoglobin. -- 10/16/2020 SPEP/KRISH reported an elevation of regions containing acute phase proteins suggesting an acute/subacute inflammatory response, some conditions in which this pattern has been observed include: bacterial, viral or parasitic infection; mechanical, physical or chemical trauma; and cardiac failure. The gamma globulin region is unremarkable and evidence of monoclonal protein is not apparent. -- 12/01/20 S.p Injectafer x 2 
-- Today I have reviewed with the patient about recent lab reports. 2/11/2021 CBC reported hemoglobin 12.1, hematocrit 36.6% MCV 83. Iron saturation 35% and ferritin 231. 
-- His chronic anemia could be multifactorial, from iron deficiency status and anemia of chronic kidney disease/anemia of chronic disease. Clinically improving with IV Iron Plan: 
-- We will continue to monitor CBC, Iron profiles, ferritin periodically. -- He will follow-up with his PCP/GI for screening colonoscopy if indicated. -- We will see the patient back in clinic in about 4 months. Always sooner if required. No orders of the defined types were placed in this encounter. All of patient's questions answered to their apparent satisfaction. They verbally show understanding and agreement with aforementioned plan. Oksana Malave MD 
2/17/2021 About 25 minutes were spent for this encounter with more than 50% of the time spent in face-to-face counseling, discussing on diagnosis and management plan going forward, and co-ordination of care. Parts of this document has been produced using Dragon dictation system. Unrecognized errors in transcription may be present. Please do not hesitate to reach out for any questions or clarifications.  
 
 
CC: Carrie Knowles MD

## 2021-03-20 PROBLEM — E87.5 HYPERKALEMIA, DIMINISHED RENAL EXCRETION: Status: ACTIVE | Noted: 2021-01-01

## 2021-03-22 NOTE — TELEPHONE ENCOUNTER
Pts wife called to say pt was admitted into [facility]. His wife stated that the patients potassium was up to 9.

## 2021-03-30 PROBLEM — R57.9 SHOCK (HCC): Status: ACTIVE | Noted: 2021-01-01

## 2021-03-30 PROBLEM — R19.7 DIARRHEA: Status: ACTIVE | Noted: 2021-01-01

## 2021-03-30 PROBLEM — G93.41 ACUTE METABOLIC ENCEPHALOPATHY: Status: ACTIVE | Noted: 2021-01-01

## 2021-03-30 PROBLEM — U07.1 COVID-19: Status: ACTIVE | Noted: 2021-01-01

## 2021-03-30 NOTE — PROGRESS NOTES
Care Transitions Initial Follow Up Call Call within 2 business days of discharge: Yes Patient: Tasia Ryder Patient : 1950 MRN: [id number] Last Discharge [address] Complaint Diagnosis Description Type Department Provider 3/20/21 Weight Loss Hyperkalemia, diminished renal excretion . .. ED to Hosp-Admission (Discharged) (ADMIT) Dk Blanco, Zoila Chapman MD; Joyce Fragoso... Was this an external facility discharge? Yes, 3/29/21 Discharge Facility: [facility]. CTN reached Pt. Spouse on phone who informed CTN that Patient was discharged from the hospital yesterday. Patient spouse reported that Patient is now getting some rest. 
Pt. Ate breakfast,  took his medications , and already went to the bathroom this morning per Pt. Spouse. Pt. Spouse states that Patient is not confused and doesn't have dementia. Patient spouse also mentioned that she feels that Patient is giving up . Patient spouse is asking if Pt. Appointment is virtual.  
Patient spouse is requesting medication to help with Pt.back and neck pain. Sent message via chart routing to Pt. PCP. Patient spouse reported that Patient is following self quarantine and have his own room and have own bathroom. Pt. Spouse states that she is wearing a mask and washing her hands when she cares for Pt. Pt. Spouse kept the conversation short and ended the call. [address] follow up appointment(s):  
Future Appointments Date Time Provider Gumaro Mcqueen 2021 11:00 AM Shruti Berry MD [facility]  
2021  8:45 AM LAB_[facility] BS AMB  
2021  9:00 AM Kelly Sparks NP [facility] BS AMB  
2021  8:15 AM [facility] NURSE VISIT [facility]  
2021  8:20 AM Aline Watson MD [facility] BS AMB Plan for follow-up call in 7-10 days based on severity of symptoms and risk factors. CTN provided contact information for future needs.

## 2021-03-31 PROBLEM — E86.0 DEHYDRATION: Status: ACTIVE | Noted: 2021-01-01

## 2021-03-31 PROBLEM — E16.2 HYPOGLYCEMIA: Status: ACTIVE | Noted: 2021-01-01

## 2021-03-31 PROBLEM — R62.7 FAILURE TO THRIVE IN ADULT: Status: ACTIVE | Noted: 2021-01-01

## 2021-03-31 PROBLEM — N17.9 AKI (ACUTE KIDNEY INJURY) (HCC): Status: ACTIVE | Noted: 2021-01-01

## 2021-04-02 NOTE — TELEPHONE ENCOUNTER
Patients wife called to cancel the virtual visit. She stated that her  is back in the hospital, and they are quarantined separately.

## 2021-04-15 NOTE — PROGRESS NOTES
Post Acute Facility Update     *The information contained in this note was received during a weekly care coordination call with the post acute facility*    This patient was discharged from [facility] to [facility] ([facility])   on 4/5/21. Hospital Discharge Diagnosis per Dr. Maureen Spear:    Discharge Diagnoses:   1.  Septic shock likely due to UTI  2.  Acute metabolic encephalopathy multifactorial  3.  CONNER on CKD 3B  4.  Malnutrition severe  5.  Weakness and deconditioning  6.    Recent Covid infection  7. Hypertension  8. 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  90 ft   Device Used: walker  Barriers to Discharge: lives w/ wife - may need additional care giver assistance  Other:

## 2021-04-22 NOTE — PROGRESS NOTES
Post Acute Facility Update     *The information contained in this note was received during a weekly care coordination call with the post acute facility*    This patient was discharged from [facility] to [facility] ([facility])   on 4/5/21. Hospital Discharge Diagnosis per Dr. Parisa Gomez:    Discharge Diagnoses:   1.  Septic shock likely due to UTI  2.  Acute metabolic encephalopathy multifactorial  3.  CONNER on CKD 3B  4.  Malnutrition severe  5.  Weakness and deconditioning  6.    Recent Covid infection  7. Hypertension  8. 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  90 ft   Device Used: walker  Barriers to Discharge: lives w/ wife - may need additional care giver assistance  Other:

## 2021-04-27 ENCOUNTER — TELEPHONE (OUTPATIENT)
Dept: INTERNAL MEDICINE CLINIC | Age: 71
End: 2021-04-27

## 2021-04-27 NOTE — TELEPHONE ENCOUNTER
Patients wife wants to know when RD will sign the patient's death certificate. She is requesting [place] call her back.

## 2023-05-15 NOTE — PROGRESS NOTES
76 y.o. white male who presents for f/u Continues to try to be active with chores, no exercise. No cardiovascular complaints. No polyuria, polydipsia, nocturia, vision change or neurologic complaints. Not checking sugars regularly. Weight is stable. Sees Dr. Jeane Kate 2/4/2019 11/6/2018 10/3/2018 3/30/2018 2/22/2018 Weight 172 lb 3.2 oz 176 lb 176 lb 168 lb 178 lb SpO2 99  96 98 97 Denies any claudication sx when he does walk. No gi or gu issues LAST MEDICARE WELLNESS EXAM: 7/13/16, 7/20/17, 10/3/18 Past Medical History:  
Diagnosis Date  Anemia   
 fe def w gi w/u Dr Elizabeth Sanchez  Basal cell carcinoma   
 s/p resection Dr. Jorge Hawkins  Chronic back pain inoperable Dr. Mel Huggins and Dr. Henrique Andrade  Colon adenoma   
 and diverticulosis Dr Elizabeth Sanchez 2014  Diabetes mellitus (HCC)   
 microalbuminuria  DJD (degenerative joint disease)  Dyshidrotic eczema  Dyslipidemia  ED (erectile dysfunction)  FHx: heart disease  Gastritis 2014 Dr Elizabeth Sanchez neg h pylori  GERD (gastroesophageal reflux disease) 2014  
 on EGD Dr Elizabeth Sanchez  Hypertension  Left renal artery stenosis ([address] 11/30/2017  Meniere's disease   
 s/p surgery Dr Patricia Garcia  Overweight (BMI 25.0-29.9) 11/30/2017  PAD (peripheral artery disease) ([address] Past Surgical History:  
Procedure Laterality Date  CARDIAC SURG PROCEDURE UNLIST  10/10 NST neg EF 68%  CARDIAC SURG PROCEDURE UNLIST  02/2018  
 echo nl LV, ef 62%, mild mr/tr/pr, rvp 33 [facility]  
 HX CHOLECYSTECTOMY  2011 Dr. Rafi Palafox HX COLONOSCOPY  2014  
 adenoma and divertics Dr Elizabeth Sanchez  HX GI  2014  
 pill camera non bleeding ileal AVM  HX TONSILLECTOMY  SINUS SURGERY PROC UNLISTED    
 left ear surgery Dr Patricia Garcia for meniere's  VASCULAR SURGERY PROCEDURE UNLIST  10/10 Illiac Bypass left Graft Surgery  Dr. Anam Spann Social History Socioeconomic History  Marital status:   
 Spouse name: Not on file  Number of children: 0  
 Years of education: Not on file  Highest education level: Not on file Social Needs  Financial resource strain: Not on file  Food insecurity - worry: Not on file  Food insecurity - inability: Not on file  Transportation needs - medical: Not on file  Transportation needs - non-medical: Not on file Occupational History  Occupation: [address] Tobacco Use  Smoking status: Former Smoker Packs/day: 1.00 Last attempt to quit: 10/22/2011 Years since quittin.2  Smokeless tobacco: Never Used Substance and Sexual Activity  Alcohol use: No  
 Drug use: No  
 Sexual activity: Not on file Other Topics Concern  Not on file Social History Narrative  Not on file Current Outpatient Medications Medication Sig  
 amLODIPine (NORVASC) 10 mg tablet Take 1 Tab by mouth daily.  diazePAM (VALIUM) 5 mg tablet TAKE 1 TABLET BY MOUTH 2 TIMES A DAY NOT TO EXCEED 2 TABLETS DAILY  carvedilol (COREG) 6.25 mg tablet Take 1 Tab by mouth two (2) times daily (with meals).  lansoprazole (PREVACID) 30 mg capsule TAKE 1 CAPSULE BY MOUTH ONCE DAILY BEFORE BREAKFAST  atorvastatin (LIPITOR) 40 mg tablet Take 1 Tab by mouth daily.  gabapentin (NEURONTIN) 600 mg tablet TAKE 1 TABLET BY MOUTH 3 TIMES A DAY  metFORMIN (GLUCOPHAGE) 1,000 mg tablet Take 1 Tab by mouth two (2) times daily (with meals).  clopidogrel (PLAVIX) 75 mg tab Take 1 Tab by mouth daily.  naloxone (NARCAN) 4 mg/actuation nasal spray Use 1 spray intranasally into 1 nostril. Use a new Narcan nasal spray for subsequent doses and administer into alternating nostrils. May repeat every 2 to 3 minutes as needed.  acetaminophen (TYLENOL) 325 mg tablet Take  by mouth every four (4) hours as needed for Pain.  aspirin 81 mg chewable tablet Take 81 mg by mouth daily.  traMADol (ULTRAM) 50 mg tablet Take 50 mg by mouth every six (6) hours as needed for Pain.  triamcinolone (ARISTOCORT) 0.5 % topical cream Apply  to affected area two (2) times a day. use thin layer No current facility-administered medications for this visit. No Known Allergies REVIEW OF SYSTEMS: sees Dr. Aliza Ngo, no podiatry, colo 2014 Dr Heaven Smith Ophtho  no vision change or eye pain Oral  no mouth pain, tongue or tooth problems Ears  no hearing loss, ear pain, fullness, no swallowing problems Cardiac  no CP, PND, orthopnea, edema, palpitations or syncope Chest  no breast masses Resp  no wheezing, chronic coughing, dyspnea GI  no heartburn, nausea, vomiting, change in bowel habits, bleeding, hemorrhoids Urinary  no dysuria, hematuria, flank pain, urgency, frequency Endo - no polyuria, polydipsia, nocturia, hot flashes Visit Vitals /58 (BP 1 Location: Left arm, BP Patient Position: Sitting) Pulse 73 Temp 96.9 °F (36.1 °C) (Oral) Resp 12 Ht 5' 9\" (1.753 m) Wt 172 lb 3.2 oz (78.1 kg) SpO2 99% BMI 25.43 kg/m² A&O x3 Affect is appropriate. Mood stable No apparent distress Anicteric, no JVD, adenopathy or thyromegaly. No carotid bruits or radiated murmur Lungs clear to auscultation, no wheezes or rales Heart showed regular rate and rhythm. No murmur, rubs, gallops Abdomen soft nontender, no hepatosplenomegaly or masses. Ext without c/c/e, 1+m pulses dp/pt LABS From 10/11 showed gluc 99,  cr 0.98, gfr 83, alt 43,  hba1c 5.6, ldl-p 1018, chol 124, tg 223, hdl 37, ldl-c 42, umar 11.2, tsh 2.10,          psa 0.90 From 4/12 showed                  hba1c 5.8, ldl-p 1136, chol 143, tg 188, hdl 38, ldl-c 67, umar 9.5 From 10/12 showed gluc 87,  cr 0.99, gfr 81, alt 15,  hba1c 5.6, ldl-p 500,   chol 109, tg 125, hdl 40, ldl-c 44 From 7/13 showed                  hba1c 5.7,                   chol 136, tg 137, hdl 42, ldl-c 67  
 From 2/14 showed                  hba1c 6.0,               chol 136, tg 72,   hdl 52, ldl-c 72, umar 347,  wbc 12.4, hb 12.0, plt 240, psa 1.39 From 5/14 showed                  hba1c 6.0,               chol 135, tg 94,   hdl 45, ldl-c 71, umar 469,  wbc 9.5,   hb 11.6, plt 232, psa 1.35 From 5/14 showed                                      fe 25, %sat 7, ferritin 32, b12 699, fol 16.1, spep neg From 6/14 showed   gluc 100, cr 1.33, gfr 54 From 8/14 showed                  hba1c 6.1,               chol 137, tg 93,   hdl 46, ldl-c 98, umar 100 From 2/15 showed   gluc 100, cr 1.50, gfr 46, alt 14, hba1c 5.9,             umar 82.1,  wbc 9.9,   hb 11.2, plt 203 From 9/15 showed   gluc 88,   cr 1.30,    alt 11, hba1c 5.6,    chol 135, tg 123, hdl 43, ldl-c 67, umar 159,   wbc 9.2,   hb 11.8, plt 225 From 3/16 showed   gluc 77,   cr 1.58, gfr 45,   hba1c 6.0,                    hep c neg, na 133 From 7/16 showed   gluc 96,   cr 1.41, gfr 52,   hba1c 5.9,             umar 163,   wbc 9.6,  hb 12.1, plt 281,                    na 131 From 1/17 showed   gluc 79,   cr 1.50, gfr 48 From 7/17 showed   gluc 92,   cr 1.57, gfr 45, alt 6,   hba1c 5.8,   chol 170, tg 135, hdl 44, ldl-c 99, umar 1043 From 11/17 showed      hba1c 5.7,   chol 156, tg 125, hdl 51, ldl-c 80, umar 779,   wbc 10.1, hb 12.0, plt 264 From 2/18 showed   gluc 83,   cr 1.40, gfr 54,                       wbc 6.2,   hb 9.8,   plt 216, fe 66 ferritin 58.6, b12 376 From 3/18 showed   gluc 82,   cr 1.69, gfr 41,   hba1c 5.7,            umar 275 From 10/18 showed gluc 86,   cr 1.51, gfr47,   hba1c 5.7,             chol 149, tg 150, hdl 33, ldl-c 86 From 1/19 showed   gluc 80,   cr 1.43, gfr 50, alt 11, hba1c 5.8,     chol 139, tg 146, hdl 37, ld-c 73,    wbc 11.6, hb 11.3, plt 303, k 5.6 Patient Active Problem List  
Diagnosis Code  Hyperlipidemia E78.5  Chronic back pain inoperable Dr. Henry Hair and Dr. Gibran Medrano M54.9, X23.25  
  Peripheral vascular disease s/p left iliac bpg Dr. Esther Vera 10/10 I73.9  Erectile dysfunction N52.9  Arthritis, degenerative M19.90  
 Primary hypertension I10  
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 9) E66.3 Assessment and plan: 1. Ménière's. Followup Dr. Hakeem Castillo prn 2. Diabetes w microalbuminuria. Well-controlled on metformin alone, close watch on renal fxn 3. Hyperlipidemia. Continue current regimen. 4. Chronic back problems. Continue current 5. Vascular. F/U Dr. Esther Vera as directed 6. CKD. F/U Dr Vines Masters 7. HTN. See discussion below. Stop acei, inc amlo to 10 and coreg to 12.5 and bp check 2 weeks. 8. Overweight. Lifestyle and dietary measures, portion control 9. Hyperkalemia. Stop acei and check labs in 2 weeks RTC 2/18 Above conditions discussed at length and patient vocalized understanding. All questions answered to patient satisfaction Lab Facility: [id number]

## 2025-01-07 NOTE — PROGRESS NOTES
1. Have you been to an emergency room or urgent care clinic since your last visit? yes  Hospitalized since your last visit? If yes, where, when, and reason for visit? yes  2. Have you seen or consulted any other health care providers outside of the [facility] since your last visit including any procedures, health maintenance items. If yes, where, when and reason for visit? abdominal pain